# Patient Record
Sex: FEMALE | Race: WHITE | NOT HISPANIC OR LATINO
[De-identification: names, ages, dates, MRNs, and addresses within clinical notes are randomized per-mention and may not be internally consistent; named-entity substitution may affect disease eponyms.]

---

## 2017-01-18 ENCOUNTER — PRIOR ORIGINAL RECORDS (OUTPATIENT)
Dept: OTHER | Age: 62
End: 2017-01-18

## 2017-05-12 ENCOUNTER — PRIOR ORIGINAL RECORDS (OUTPATIENT)
Dept: OTHER | Age: 62
End: 2017-05-12

## 2017-06-27 ENCOUNTER — TELEPHONE (OUTPATIENT)
Dept: INTERNAL MEDICINE CLINIC | Facility: CLINIC | Age: 62
End: 2017-06-27

## 2017-06-27 DIAGNOSIS — E78.5 HYPERLIPIDEMIA, UNSPECIFIED HYPERLIPIDEMIA TYPE: ICD-10-CM

## 2017-06-27 DIAGNOSIS — Z00.00 ANNUAL PHYSICAL EXAM: ICD-10-CM

## 2017-06-27 DIAGNOSIS — Z11.59 ENCOUNTER FOR HEPATITIS C SCREENING TEST FOR LOW RISK PATIENT: ICD-10-CM

## 2017-06-27 DIAGNOSIS — R60.0 BILATERAL LOWER EXTREMITY EDEMA: Primary | ICD-10-CM

## 2017-06-27 DIAGNOSIS — E53.8 VITAMIN B 12 DEFICIENCY: ICD-10-CM

## 2017-06-27 DIAGNOSIS — E55.9 VITAMIN D DEFICIENCY: ICD-10-CM

## 2017-06-27 NOTE — TELEPHONE ENCOUNTER
Pt has an fifi for yearly physical on 7/31 pt would like and order for lab work including Magnesium, Vitamin D, Thyroid & B12. Pt would like the order mailed to her she is not sure where she will be going for labs.   Tasked to nursing

## 2017-07-20 ENCOUNTER — LAB ENCOUNTER (OUTPATIENT)
Dept: LAB | Age: 62
End: 2017-07-20
Attending: INTERNAL MEDICINE
Payer: COMMERCIAL

## 2017-07-20 ENCOUNTER — PRIOR ORIGINAL RECORDS (OUTPATIENT)
Dept: OTHER | Age: 62
End: 2017-07-20

## 2017-07-20 DIAGNOSIS — Z11.59 ENCOUNTER FOR HEPATITIS C SCREENING TEST FOR LOW RISK PATIENT: ICD-10-CM

## 2017-07-20 DIAGNOSIS — R60.0 BILATERAL LOWER EXTREMITY EDEMA: ICD-10-CM

## 2017-07-20 DIAGNOSIS — Z00.00 ANNUAL PHYSICAL EXAM: ICD-10-CM

## 2017-07-20 DIAGNOSIS — E55.9 VITAMIN D DEFICIENCY: ICD-10-CM

## 2017-07-20 DIAGNOSIS — E78.5 HYPERLIPIDEMIA, UNSPECIFIED HYPERLIPIDEMIA TYPE: ICD-10-CM

## 2017-07-20 LAB
ALBUMIN SERPL BCP-MCNC: 4.1 G/DL (ref 3.5–4.8)
ALBUMIN/GLOB SERPL: 1.1 {RATIO} (ref 1–2)
ALP SERPL-CCNC: 60 U/L (ref 32–100)
ALT SERPL-CCNC: 17 U/L (ref 14–54)
ANION GAP SERPL CALC-SCNC: 9 MMOL/L (ref 0–18)
AST SERPL-CCNC: 25 U/L (ref 15–41)
BACTERIA UR QL AUTO: NEGATIVE /HPF
BASOPHILS # BLD: 0 K/UL (ref 0–0.2)
BASOPHILS NFR BLD: 1 %
BILIRUB SERPL-MCNC: 1.2 MG/DL (ref 0.3–1.2)
BILIRUB UR QL: NEGATIVE
BUN SERPL-MCNC: 10 MG/DL (ref 8–20)
BUN/CREAT SERPL: 12.3 (ref 10–20)
CALCIUM SERPL-MCNC: 9.6 MG/DL (ref 8.5–10.5)
CHLORIDE SERPL-SCNC: 103 MMOL/L (ref 95–110)
CHOLEST SERPL-MCNC: 205 MG/DL (ref 110–200)
CLARITY UR: CLEAR
CO2 SERPL-SCNC: 27 MMOL/L (ref 22–32)
COLOR UR: YELLOW
CREAT SERPL-MCNC: 0.81 MG/DL (ref 0.5–1.5)
EOSINOPHIL # BLD: 0.1 K/UL (ref 0–0.7)
EOSINOPHIL NFR BLD: 3 %
ERYTHROCYTE [DISTWIDTH] IN BLOOD BY AUTOMATED COUNT: 13 % (ref 11–15)
GLOBULIN PLAS-MCNC: 3.6 G/DL (ref 2.5–3.7)
GLUCOSE SERPL-MCNC: 96 MG/DL (ref 70–99)
GLUCOSE UR-MCNC: NEGATIVE MG/DL
HCT VFR BLD AUTO: 42.1 % (ref 35–48)
HDLC SERPL-MCNC: 63 MG/DL
HGB BLD-MCNC: 14.5 G/DL (ref 12–16)
HGB UR QL STRIP.AUTO: NEGATIVE
KETONES UR-MCNC: NEGATIVE MG/DL
LDLC SERPL CALC-MCNC: 125 MG/DL (ref 0–99)
LYMPHOCYTES # BLD: 1.2 K/UL (ref 1–4)
LYMPHOCYTES NFR BLD: 28 %
MAGNESIUM SERPL-MCNC: 2 MG/DL (ref 1.8–2.5)
MCH RBC QN AUTO: 31.8 PG (ref 27–32)
MCHC RBC AUTO-ENTMCNC: 34.4 G/DL (ref 32–37)
MCV RBC AUTO: 92.6 FL (ref 80–100)
MONOCYTES # BLD: 0.5 K/UL (ref 0–1)
MONOCYTES NFR BLD: 10 %
NEUTROPHILS # BLD AUTO: 2.6 K/UL (ref 1.8–7.7)
NEUTROPHILS NFR BLD: 58 %
NITRITE UR QL STRIP.AUTO: NEGATIVE
NONHDLC SERPL-MCNC: 142 MG/DL
OSMOLALITY UR CALC.SUM OF ELEC: 287 MOSM/KG (ref 275–295)
PH UR: 8 [PH] (ref 5–8)
PLATELET # BLD AUTO: 191 K/UL (ref 140–400)
PMV BLD AUTO: 9.4 FL (ref 7.4–10.3)
POTASSIUM SERPL-SCNC: 3.9 MMOL/L (ref 3.3–5.1)
PROT SERPL-MCNC: 7.7 G/DL (ref 5.9–8.4)
PROT UR-MCNC: NEGATIVE MG/DL
RBC # BLD AUTO: 4.54 M/UL (ref 3.7–5.4)
RBC #/AREA URNS AUTO: <1 /HPF
SODIUM SERPL-SCNC: 139 MMOL/L (ref 136–144)
SP GR UR STRIP: 1.01 (ref 1–1.03)
TRIGL SERPL-MCNC: 86 MG/DL (ref 1–149)
TSH SERPL-ACNC: 2.21 UIU/ML (ref 0.45–5.33)
UROBILINOGEN UR STRIP-ACNC: <2
VIT B12 SERPL-MCNC: 164 PG/ML (ref 181–914)
VIT C UR-MCNC: NEGATIVE MG/DL
WBC # BLD AUTO: 4.5 K/UL (ref 4–11)
WBC #/AREA URNS AUTO: 5 /HPF

## 2017-07-20 PROCEDURE — 82306 VITAMIN D 25 HYDROXY: CPT

## 2017-07-20 PROCEDURE — 84443 ASSAY THYROID STIM HORMONE: CPT

## 2017-07-20 PROCEDURE — 85025 COMPLETE CBC W/AUTO DIFF WBC: CPT

## 2017-07-20 PROCEDURE — 82607 VITAMIN B-12: CPT

## 2017-07-20 PROCEDURE — 80061 LIPID PANEL: CPT

## 2017-07-20 PROCEDURE — 83735 ASSAY OF MAGNESIUM: CPT

## 2017-07-20 PROCEDURE — 80053 COMPREHEN METABOLIC PANEL: CPT

## 2017-07-20 PROCEDURE — 36415 COLL VENOUS BLD VENIPUNCTURE: CPT

## 2017-07-20 PROCEDURE — 86803 HEPATITIS C AB TEST: CPT

## 2017-07-20 PROCEDURE — 81001 URINALYSIS AUTO W/SCOPE: CPT

## 2017-07-21 LAB
25(OH)D3 SERPL-MCNC: 37.5 NG/ML
HCV AB SERPL QL IA: NONREACTIVE

## 2017-07-31 ENCOUNTER — OFFICE VISIT (OUTPATIENT)
Dept: INTERNAL MEDICINE CLINIC | Facility: CLINIC | Age: 62
End: 2017-07-31

## 2017-07-31 ENCOUNTER — TELEPHONE (OUTPATIENT)
Dept: INTERNAL MEDICINE CLINIC | Facility: CLINIC | Age: 62
End: 2017-07-31

## 2017-07-31 VITALS
HEART RATE: 69 BPM | TEMPERATURE: 98 F | DIASTOLIC BLOOD PRESSURE: 80 MMHG | WEIGHT: 248.81 LBS | HEIGHT: 62 IN | BODY MASS INDEX: 45.79 KG/M2 | SYSTOLIC BLOOD PRESSURE: 132 MMHG | OXYGEN SATURATION: 97 %

## 2017-07-31 DIAGNOSIS — Z00.00 ANNUAL PHYSICAL EXAM: Primary | ICD-10-CM

## 2017-07-31 DIAGNOSIS — E53.8 VITAMIN B 12 DEFICIENCY: ICD-10-CM

## 2017-07-31 DIAGNOSIS — I48.91 ATRIAL FIBRILLATION, UNSPECIFIED TYPE (HCC): ICD-10-CM

## 2017-07-31 DIAGNOSIS — R19.5 OCCULT BLOOD POSITIVE STOOL: ICD-10-CM

## 2017-07-31 DIAGNOSIS — Z23 NEED FOR SHINGLES VACCINE: ICD-10-CM

## 2017-07-31 DIAGNOSIS — R19.05 PERIUMBILICAL MASS: ICD-10-CM

## 2017-07-31 DIAGNOSIS — R92.8 ABNORMAL MAMMOGRAM: ICD-10-CM

## 2017-07-31 DIAGNOSIS — I87.2 CHRONIC VENOUS INSUFFICIENCY: ICD-10-CM

## 2017-07-31 PROCEDURE — 99214 OFFICE O/P EST MOD 30 MIN: CPT | Performed by: INTERNAL MEDICINE

## 2017-07-31 PROCEDURE — 99396 PREV VISIT EST AGE 40-64: CPT | Performed by: INTERNAL MEDICINE

## 2017-07-31 NOTE — TELEPHONE ENCOUNTER
Placed patient health provider screening form and lab test order on cart. I told patient she can pick these up tomorrow.

## 2017-07-31 NOTE — PROGRESS NOTES
HPI:   Collette Dire is a 60year old female presents with the following problems. Patient here for physical.    Patient generally feels well. She has no acute complaints. I did review her labs with her. Vit B12 level was low.   Recommended vit represent a benign intramammary lymph node. This likely correlated with the mammogram findings and is probably benign. Six-month follow-up was recommended.   Patient knows to follow-up in September with repeat mammogram.    Patient has chronic venous insu Disp:  Rfl:    vitamin E (E-400) 400 UNITS Oral Cap Take 1 capsule by mouth every other day. Disp:  Rfl:    acetaminophen (TYLENOL EXTRA STRENGTH) 500 MG Oral Tab Take 500 mg by mouth every 6 (six) hours as needed for Pain.  Disp:  Rfl:    loratadine (CL Range   HDL Cholesterol 63 mg/dL   Cholesterol, Total 205 (H) 110 - 200 mg/dL   Triglycerides 86 1 - 149 mg/dL   Non HDL Chol 142 (H) <130 mg/dL   LDL Cholesterol 125 (H) 0 - 99 mg/dL   -URINALYSIS, ROUTINE   Result Value Ref Range   Urine Color Yellow Yel Packs/day: 0.40      Years: 33.00        Quit date: 6/15/1974  Alcohol use: Yes           1.2 oz/week     Cans of beer: 2 per week         REVIEW OF SYSTEMS:   GENERAL:  feels well. No acute distress.    EYES ;  denies blurred vision or eye pain  HEEN Annual physical exam  Annual physical exam.    2. Chronic venous insufficiency  No acute ulcerations or swelling. Discussed availability Dr. Alanna Hopkins. For now patient wishes not to pursue. 3. Vitamin B 12 deficiency  Start vitamin B12 1000 mcg daily.   Rep

## 2017-09-10 ENCOUNTER — APPOINTMENT (OUTPATIENT)
Dept: GENERAL RADIOLOGY | Facility: HOSPITAL | Age: 62
DRG: 354 | End: 2017-09-10
Attending: HOSPITALIST
Payer: COMMERCIAL

## 2017-09-10 ENCOUNTER — APPOINTMENT (OUTPATIENT)
Dept: CT IMAGING | Facility: HOSPITAL | Age: 62
DRG: 354 | End: 2017-09-10
Attending: EMERGENCY MEDICINE
Payer: COMMERCIAL

## 2017-09-10 ENCOUNTER — HOSPITAL ENCOUNTER (INPATIENT)
Facility: HOSPITAL | Age: 62
LOS: 4 days | Discharge: HOME | DRG: 354 | End: 2017-09-14
Attending: EMERGENCY MEDICINE | Admitting: HOSPITALIST
Payer: COMMERCIAL

## 2017-09-10 DIAGNOSIS — K43.0 INCARCERATED INCISIONAL HERNIA: ICD-10-CM

## 2017-09-10 DIAGNOSIS — K56.609 SMALL BOWEL OBSTRUCTION (HCC): Primary | ICD-10-CM

## 2017-09-10 DIAGNOSIS — K43.6 VENTRAL HERNIA WITH OBSTRUCTION AND WITHOUT GANGRENE: ICD-10-CM

## 2017-09-10 LAB
ALBUMIN SERPL BCP-MCNC: 4.2 G/DL (ref 3.5–4.8)
ALBUMIN/GLOB SERPL: 1 {RATIO} (ref 1–2)
ALP SERPL-CCNC: 71 U/L (ref 32–100)
ALT SERPL-CCNC: 21 U/L (ref 14–54)
AMYLASE SERPL-CCNC: 40 U/L (ref 24–108)
ANION GAP SERPL CALC-SCNC: 12 MMOL/L (ref 0–18)
AST SERPL-CCNC: 33 U/L (ref 15–41)
BASOPHILS # BLD: 0.1 K/UL (ref 0–0.2)
BASOPHILS NFR BLD: 1 %
BILIRUB SERPL-MCNC: 1.4 MG/DL (ref 0.3–1.2)
BUN SERPL-MCNC: 12 MG/DL (ref 8–20)
BUN/CREAT SERPL: 11.5 (ref 10–20)
CALCIUM SERPL-MCNC: 10.2 MG/DL (ref 8.5–10.5)
CHLORIDE SERPL-SCNC: 104 MMOL/L (ref 95–110)
CO2 SERPL-SCNC: 19 MMOL/L (ref 22–32)
CREAT SERPL-MCNC: 1.04 MG/DL (ref 0.5–1.5)
EOSINOPHIL # BLD: 0 K/UL (ref 0–0.7)
EOSINOPHIL NFR BLD: 0 %
ERYTHROCYTE [DISTWIDTH] IN BLOOD BY AUTOMATED COUNT: 13.1 % (ref 11–15)
GLOBULIN PLAS-MCNC: 4.4 G/DL (ref 2.5–3.7)
GLUCOSE SERPL-MCNC: 149 MG/DL (ref 70–99)
HCT VFR BLD AUTO: 47.4 % (ref 35–48)
HGB BLD-MCNC: 16.3 G/DL (ref 12–16)
LACTATE SERPL-SCNC: 1 MMOL/L (ref 0.5–2.2)
LIPASE SERPL-CCNC: 33 U/L (ref 22–51)
LYMPHOCYTES # BLD: 1 K/UL (ref 1–4)
LYMPHOCYTES NFR BLD: 7 %
MAGNESIUM SERPL-MCNC: 1.9 MG/DL (ref 1.8–2.5)
MCH RBC QN AUTO: 30.9 PG (ref 27–32)
MCHC RBC AUTO-ENTMCNC: 34.4 G/DL (ref 32–37)
MCV RBC AUTO: 90 FL (ref 80–100)
MONOCYTES # BLD: 0.6 K/UL (ref 0–1)
MONOCYTES NFR BLD: 4 %
NEUTROPHILS # BLD AUTO: 11.8 K/UL (ref 1.8–7.7)
NEUTROPHILS NFR BLD: 88 %
OSMOLALITY UR CALC.SUM OF ELEC: 283 MOSM/KG (ref 275–295)
PLATELET # BLD AUTO: 261 K/UL (ref 140–400)
PMV BLD AUTO: 9 FL (ref 7.4–10.3)
POTASSIUM SERPL-SCNC: 3.3 MMOL/L (ref 3.3–5.1)
PROT SERPL-MCNC: 8.6 G/DL (ref 5.9–8.4)
RBC # BLD AUTO: 5.26 M/UL (ref 3.7–5.4)
SODIUM SERPL-SCNC: 135 MMOL/L (ref 136–144)
WBC # BLD AUTO: 13.4 K/UL (ref 4–11)

## 2017-09-10 PROCEDURE — 99223 1ST HOSP IP/OBS HIGH 75: CPT | Performed by: HOSPITALIST

## 2017-09-10 PROCEDURE — 71010 XR CHEST AP PORTABLE  (CPT=71010): CPT | Performed by: HOSPITALIST

## 2017-09-10 PROCEDURE — 74176 CT ABD & PELVIS W/O CONTRAST: CPT | Performed by: EMERGENCY MEDICINE

## 2017-09-10 RX ORDER — ONDANSETRON 2 MG/ML
INJECTION INTRAMUSCULAR; INTRAVENOUS
Status: DISPENSED
Start: 2017-09-10 | End: 2017-09-10

## 2017-09-10 RX ORDER — MELATONIN
1000 EVERY OTHER DAY
COMMUNITY

## 2017-09-10 RX ORDER — VITAMIN E 268 MG
400 CAPSULE ORAL DAILY
Status: DISCONTINUED | OUTPATIENT
Start: 2017-09-10 | End: 2017-09-14

## 2017-09-10 RX ORDER — 0.9 % SODIUM CHLORIDE 0.9 %
VIAL (ML) INJECTION
Status: DISPENSED
Start: 2017-09-10 | End: 2017-09-10

## 2017-09-10 RX ORDER — MORPHINE SULFATE 4 MG/ML
4 INJECTION, SOLUTION INTRAMUSCULAR; INTRAVENOUS ONCE
Status: COMPLETED | OUTPATIENT
Start: 2017-09-10 | End: 2017-09-10

## 2017-09-10 RX ORDER — TRIAMTERENE AND HYDROCHLOROTHIAZIDE 37.5; 25 MG/1; MG/1
1 CAPSULE ORAL DAILY
Status: DISCONTINUED | OUTPATIENT
Start: 2017-09-10 | End: 2017-09-14

## 2017-09-10 RX ORDER — CHOLECALCIFEROL (VITAMIN D3) 125 MCG
1000 CAPSULE ORAL DAILY
Status: DISCONTINUED | OUTPATIENT
Start: 2017-09-10 | End: 2017-09-14

## 2017-09-10 RX ORDER — SODIUM CHLORIDE 0.9 % (FLUSH) 0.9 %
3 SYRINGE (ML) INJECTION AS NEEDED
Status: DISCONTINUED | OUTPATIENT
Start: 2017-09-10 | End: 2017-09-14

## 2017-09-10 RX ORDER — ENALAPRILAT 2.5 MG/2ML
0.62 INJECTION INTRAVENOUS EVERY 6 HOURS PRN
Status: DISCONTINUED | OUTPATIENT
Start: 2017-09-10 | End: 2017-09-14

## 2017-09-10 RX ORDER — OMEGA-3S/DHA/EPA/FISH OIL/D3 300MG-1000
1000 CAPSULE ORAL DAILY
Status: DISCONTINUED | OUTPATIENT
Start: 2017-09-10 | End: 2017-09-14

## 2017-09-10 RX ORDER — HYDROMORPHONE HYDROCHLORIDE 1 MG/ML
0.3 INJECTION, SOLUTION INTRAMUSCULAR; INTRAVENOUS; SUBCUTANEOUS EVERY 4 HOURS PRN
Status: DISCONTINUED | OUTPATIENT
Start: 2017-09-10 | End: 2017-09-14

## 2017-09-10 RX ORDER — DILTIAZEM HYDROCHLORIDE 120 MG/1
120 CAPSULE, COATED, EXTENDED RELEASE ORAL DAILY
Status: DISCONTINUED | OUTPATIENT
Start: 2017-09-10 | End: 2017-09-14

## 2017-09-10 RX ORDER — ONDANSETRON 2 MG/ML
4 INJECTION INTRAMUSCULAR; INTRAVENOUS EVERY 4 HOURS PRN
Status: DISCONTINUED | OUTPATIENT
Start: 2017-09-10 | End: 2017-09-11

## 2017-09-10 RX ORDER — ASCORBIC ACID 500 MG
500 TABLET ORAL DAILY
Status: DISCONTINUED | OUTPATIENT
Start: 2017-09-10 | End: 2017-09-14

## 2017-09-10 RX ORDER — HYDROMORPHONE HYDROCHLORIDE 1 MG/ML
0.5 INJECTION, SOLUTION INTRAMUSCULAR; INTRAVENOUS; SUBCUTANEOUS EVERY 4 HOURS PRN
Status: DISCONTINUED | OUTPATIENT
Start: 2017-09-10 | End: 2017-09-14

## 2017-09-10 RX ORDER — IBUPROFEN 200 MG
200 TABLET ORAL EVERY 6 HOURS PRN
COMMUNITY
End: 2020-02-13

## 2017-09-10 RX ORDER — ONDANSETRON 2 MG/ML
4 INJECTION INTRAMUSCULAR; INTRAVENOUS ONCE
Status: COMPLETED | OUTPATIENT
Start: 2017-09-10 | End: 2017-09-10

## 2017-09-10 RX ORDER — CLINDAMYCIN PHOSPHATE 600 MG/50ML
600 INJECTION INTRAVENOUS
Status: DISCONTINUED | OUTPATIENT
Start: 2017-09-11 | End: 2017-09-11 | Stop reason: HOSPADM

## 2017-09-10 RX ORDER — DEXTROSE AND SODIUM CHLORIDE 5; .9 G/100ML; G/100ML
INJECTION, SOLUTION INTRAVENOUS CONTINUOUS
Status: DISCONTINUED | OUTPATIENT
Start: 2017-09-10 | End: 2017-09-13

## 2017-09-10 RX ORDER — MORPHINE SULFATE 4 MG/ML
INJECTION, SOLUTION INTRAMUSCULAR; INTRAVENOUS
Status: COMPLETED
Start: 2017-09-10 | End: 2017-09-10

## 2017-09-10 RX ORDER — DEXTROSE, SODIUM CHLORIDE, AND POTASSIUM CHLORIDE 5; .9; .15 G/100ML; G/100ML; G/100ML
INJECTION INTRAVENOUS CONTINUOUS
Status: DISCONTINUED | OUTPATIENT
Start: 2017-09-10 | End: 2017-09-10

## 2017-09-10 RX ORDER — DILTIAZEM HYDROCHLORIDE 5 MG/ML
10 INJECTION INTRAVENOUS EVERY 2 HOUR PRN
Status: DISCONTINUED | OUTPATIENT
Start: 2017-09-10 | End: 2017-09-14

## 2017-09-10 RX ORDER — ONDANSETRON 2 MG/ML
4 INJECTION INTRAMUSCULAR; INTRAVENOUS ONCE
Status: DISCONTINUED | OUTPATIENT
Start: 2017-09-10 | End: 2017-09-10

## 2017-09-10 RX ORDER — ALBUTEROL SULFATE 90 UG/1
2 AEROSOL, METERED RESPIRATORY (INHALATION) EVERY 4 HOURS PRN
Status: DISCONTINUED | OUTPATIENT
Start: 2017-09-10 | End: 2017-09-14

## 2017-09-10 NOTE — H&P
[facility]    PATIENT'S NAME: Hannah Brantley   ATTENDING PHYSICIAN: Houston Mary MD   PATIENT ACCOUNT#:   [id number]    LOCATION:  [address] [place] [address] #:   [id number]       YOB: 1955  ADMISSION DATE: evaluated by Dr. Fletcher Porras. Plan was for a reversal of the patient's anticoagulation and hydration with surgery planned for tomorrow.     PAST MEDICAL HISTORY:  Significant for allergic rhinitis, atrial fibrillation which was diagnosed in August of last y systems except as listed in the History of Present Illness. PHYSICAL EXAMINATION:    VITAL SIGNS:  Temperature was 97.3, pulse 95, respiratory rate 16, blood pressure 133/77, O2 saturation 99% on room air. HEENT:  Normocephalic, atraumatic.   Pupils e We will place on continuous O2 monitoring while sleeping and intermittent end-tidal CO2 monitoring. Obtain a chest x-ray preoperatively and EKG. Cardiology clearance pending as well. 3.   Metabolic acidosis. We will send lactic acid level and amylase.

## 2017-09-10 NOTE — CONSULTS
[facility]  Report of Consultation    Adele Moralez Patient Status:  Inpatient    1955 MRN [id number]   Location [facility] 4W/SW/SE Attending Yohan Hawkins MD   Hosp Day # 0 PCP Najma Trujillo MD     Reason for Consultat Comment:Other reaction(s): vomiting & joint pain  Metronidazole               Comment:Other reaction(s): Rash  Minocycline                 Comment:Other reaction(s): Trouble Breathing  Orphenadrine Citrate        Comment:Other reaction(s): shakey  [place] Data:  Diagnostics:    Labs:     Lab Results  Component Value Date   HGB 16.3 09/10/2017   HCT 47.4 09/10/2017    09/10/2017               Assessment/Plan:    1.  Afib - rates controlled; continue CCB PO; post op if unable to take PO, will need to sw

## 2017-09-10 NOTE — HISTORICAL OFFICE NOTE
Madonna Raymond  : 1955  ACCOUNT:  [id number]  [phone]  PCP: Dr. Chavez Poles     TODAY'S DATE: 2017  DICTATED BY:  Aleida Knight M.D. ]    CHIEF COMPLAINT: [Followup of Atrial fibrillation, Followup of Family history of CAD, Followup of socially. EXERCISE: minimal. DIET: no special diet. MARITAL STATUS: . EDUCATION: college graduate. OCCUPATION: nurse.      ALLERGIES: Biaxin - Oral, Bioflex - Oral, Doryx - Oral, Doxycycline - Oral, Flagyl - Oral, Shellfish-derived Products - CLASS a 2016. Not on lipid lowering agents. Goal LDL less than 130. We will get repeat fasting lipid panel, which she will get when she sees Dr. Chau Johnson for her annual physical in July 2017.    4. Return to followup visit in six months, or sooner if there are any ne motion was normal? there were no regional wall motion  abnormalities. · Left atrium: The atrium was moderately dilated. · Right ventricle: Systolic function was normal.  · Tricuspid valve: Mildmoderate  regurgitation.   · Pulmonary arteries: PA peak press

## 2017-09-10 NOTE — ED PROVIDER NOTES
Patient Seen in: [facility] AND [facility] Emergency Department    History   Patient presents with:  Abdominal Pain    Stated Complaint: abd pain     HPI    58-year-old female presents the emergency department with abdominal pain that began on Friday.   She repor HPI.    Physical Exam   ED Triage Vitals  BP: 131/82 [09/10/17 0107]  Pulse: 119 [09/10/17 0107]  Resp: 28 [09/10/17 0107]  Temp: (!) 97.3 °F (36.3 °C) [09/10/17 0107]  Temp src: n/a  SpO2: 99 % [09/10/17 0107]  O2 Device: None (Room air) [09/10/17 0316] Absolute 11.8 (*)     All other components within normal limits   LIPASE - Normal   CBC WITH DIFFERENTIAL WITH PLATELET    Narrative: The following orders were created for panel order CBC WITH DIFFERENTIAL WITH PLATELET.   Procedure Discharge Medication List        Present on Admission  Date Reviewed: 8/12/2014          ICD-10-CM Noted POA    Small bowel obstruction ([facility]) K56.69 9/10/2017 Unknown

## 2017-09-10 NOTE — CONSULTS
[facility]    Report of Consultation    Rachel Snider Patient Status:  Inpatient    1955 MRN [id number]   Location [facility] 4W/SW/SE Attending Andrae Washington MD   Hosp Day # 0 PCP Yael Barry MD     Date of Comment:Other reaction(s): Rash  Doxycycline                 Comment:Other reaction(s): Trouble Breathing  Doxycycline Hyclate         Comment:Other reaction(s): vomiting & joint pain  Metronidazole               Comment:Other reaction(s): Rash  Minocyclin is within normal limits. Spleen is not palpable. Extremities:  No lower extremity edema noted. Without clubbing or cyanosis. Skin: Normal texture and turgor. Lymphatic:  No palpable cervical lymphadenopathy.   Neurologic: Cranial nerves are grossly

## 2017-09-10 NOTE — PROGRESS NOTES
[id number] [facility]  Antibiotic Dosing    Yevgeniy Reveles is a 60year old female for whom pharmacy is dosing Meropenem for treatment of intra-abdominal infection.       Allergies: is allergic to bioflex; cefaclor; clarithromycin; doxycycline; do

## 2017-09-10 NOTE — ED NOTES
Patient unable to tolerate NG tube insertion. We attempted x5 but every time the tube gets passed the throat patient gags and we also attempted to use benzocaine spray. At this time patient decided not to do the NGT.  Patient is aware of the risks and benef

## 2017-09-10 NOTE — ED NOTES
Patient is working on barium contrast, she is having a little difficulty finishing one bottle.  Instructed patient to try as much as she can

## 2017-09-11 ENCOUNTER — ANESTHESIA EVENT (OUTPATIENT)
Dept: SURGERY | Facility: HOSPITAL | Age: 62
DRG: 354 | End: 2017-09-11
Payer: COMMERCIAL

## 2017-09-11 ENCOUNTER — ANESTHESIA (OUTPATIENT)
Dept: SURGERY | Facility: HOSPITAL | Age: 62
DRG: 354 | End: 2017-09-11
Payer: COMMERCIAL

## 2017-09-11 ENCOUNTER — SURGERY (OUTPATIENT)
Age: 62
End: 2017-09-11

## 2017-09-11 PROBLEM — K43.0 INCISIONAL HERNIA, INCARCERATED: Status: ACTIVE | Noted: 2017-09-11

## 2017-09-11 LAB
ANION GAP SERPL CALC-SCNC: 8 MMOL/L (ref 0–18)
BASOPHILS # BLD: 0.1 K/UL (ref 0–0.2)
BASOPHILS NFR BLD: 1 %
BUN SERPL-MCNC: 9 MG/DL (ref 8–20)
BUN/CREAT SERPL: 10.1 (ref 10–20)
CALCIUM SERPL-MCNC: 8.8 MG/DL (ref 8.5–10.5)
CHLORIDE SERPL-SCNC: 105 MMOL/L (ref 95–110)
CO2 SERPL-SCNC: 26 MMOL/L (ref 22–32)
CREAT SERPL-MCNC: 0.89 MG/DL (ref 0.5–1.5)
EOSINOPHIL # BLD: 0.1 K/UL (ref 0–0.7)
EOSINOPHIL NFR BLD: 3 %
ERYTHROCYTE [DISTWIDTH] IN BLOOD BY AUTOMATED COUNT: 13 % (ref 11–15)
GLUCOSE SERPL-MCNC: 115 MG/DL (ref 70–99)
HCT VFR BLD AUTO: 40.5 % (ref 35–48)
HGB BLD-MCNC: 13.8 G/DL (ref 12–16)
LYMPHOCYTES # BLD: 1.4 K/UL (ref 1–4)
LYMPHOCYTES NFR BLD: 26 %
MAGNESIUM SERPL-MCNC: 1.8 MG/DL (ref 1.8–2.5)
MCH RBC QN AUTO: 31.6 PG (ref 27–32)
MCHC RBC AUTO-ENTMCNC: 34.2 G/DL (ref 32–37)
MCV RBC AUTO: 92.5 FL (ref 80–100)
MONOCYTES # BLD: 0.7 K/UL (ref 0–1)
MONOCYTES NFR BLD: 13 %
NEUTROPHILS # BLD AUTO: 3.1 K/UL (ref 1.8–7.7)
NEUTROPHILS NFR BLD: 58 %
OSMOLALITY UR CALC.SUM OF ELEC: 288 MOSM/KG (ref 275–295)
PLATELET # BLD AUTO: 200 K/UL (ref 140–400)
PMV BLD AUTO: 8.5 FL (ref 7.4–10.3)
POTASSIUM SERPL-SCNC: 3.6 MMOL/L (ref 3.3–5.1)
POTASSIUM SERPL-SCNC: 3.6 MMOL/L (ref 3.3–5.1)
RBC # BLD AUTO: 4.37 M/UL (ref 3.7–5.4)
SODIUM SERPL-SCNC: 139 MMOL/L (ref 136–144)
WBC # BLD AUTO: 5.4 K/UL (ref 4–11)

## 2017-09-11 PROCEDURE — 0JB80ZZ EXCISION OF ABDOMEN SUBCUTANEOUS TISSUE AND FASCIA, OPEN APPROACH: ICD-10-PCS | Performed by: SURGERY

## 2017-09-11 PROCEDURE — S0077 INJECTION, CLINDAMYCIN PHOSP: HCPCS | Performed by: ANESTHESIOLOGY

## 2017-09-11 PROCEDURE — 0WQF0ZZ REPAIR ABDOMINAL WALL, OPEN APPROACH: ICD-10-PCS | Performed by: SURGERY

## 2017-09-11 PROCEDURE — 0WPF0JZ REMOVAL OF SYNTHETIC SUBSTITUTE FROM ABDOMINAL WALL, OPEN APPROACH: ICD-10-PCS | Performed by: SURGERY

## 2017-09-11 PROCEDURE — 99233 SBSQ HOSP IP/OBS HIGH 50: CPT | Performed by: HOSPITALIST

## 2017-09-11 DEVICE — VENTRIO ST HERNIA PATCH
Type: IMPLANTABLE DEVICE | Site: ABDOMEN | Status: FUNCTIONAL
Brand: VENTRIO ST HERNIA PATCH

## 2017-09-11 RX ORDER — ONDANSETRON 2 MG/ML
INJECTION INTRAMUSCULAR; INTRAVENOUS AS NEEDED
Status: DISCONTINUED | OUTPATIENT
Start: 2017-09-11 | End: 2017-09-11 | Stop reason: SURG

## 2017-09-11 RX ORDER — ROCURONIUM BROMIDE 10 MG/ML
INJECTION, SOLUTION INTRAVENOUS AS NEEDED
Status: DISCONTINUED | OUTPATIENT
Start: 2017-09-11 | End: 2017-09-11 | Stop reason: SURG

## 2017-09-11 RX ORDER — MORPHINE SULFATE 2 MG/ML
2 INJECTION, SOLUTION INTRAMUSCULAR; INTRAVENOUS EVERY 10 MIN PRN
Status: DISCONTINUED | OUTPATIENT
Start: 2017-09-11 | End: 2017-09-11 | Stop reason: HOSPADM

## 2017-09-11 RX ORDER — HYDROMORPHONE HYDROCHLORIDE 1 MG/ML
0.8 INJECTION, SOLUTION INTRAMUSCULAR; INTRAVENOUS; SUBCUTANEOUS EVERY 2 HOUR PRN
Status: DISCONTINUED | OUTPATIENT
Start: 2017-09-11 | End: 2017-09-14

## 2017-09-11 RX ORDER — MORPHINE SULFATE 10 MG/ML
6 INJECTION, SOLUTION INTRAMUSCULAR; INTRAVENOUS EVERY 10 MIN PRN
Status: DISCONTINUED | OUTPATIENT
Start: 2017-09-11 | End: 2017-09-11 | Stop reason: HOSPADM

## 2017-09-11 RX ORDER — HYDROMORPHONE HYDROCHLORIDE 1 MG/ML
0.2 INJECTION, SOLUTION INTRAMUSCULAR; INTRAVENOUS; SUBCUTANEOUS EVERY 5 MIN PRN
Status: DISCONTINUED | OUTPATIENT
Start: 2017-09-11 | End: 2017-09-11 | Stop reason: HOSPADM

## 2017-09-11 RX ORDER — METOPROLOL TARTRATE 5 MG/5ML
2.5 INJECTION INTRAVENOUS EVERY 6 HOURS PRN
Status: DISCONTINUED | OUTPATIENT
Start: 2017-09-11 | End: 2017-09-14

## 2017-09-11 RX ORDER — MORPHINE SULFATE 4 MG/ML
4 INJECTION, SOLUTION INTRAMUSCULAR; INTRAVENOUS EVERY 10 MIN PRN
Status: DISCONTINUED | OUTPATIENT
Start: 2017-09-11 | End: 2017-09-11 | Stop reason: HOSPADM

## 2017-09-11 RX ORDER — HYDROCODONE BITARTRATE AND ACETAMINOPHEN 5; 325 MG/1; MG/1
1 TABLET ORAL AS NEEDED
Status: DISCONTINUED | OUTPATIENT
Start: 2017-09-11 | End: 2017-09-11 | Stop reason: HOSPADM

## 2017-09-11 RX ORDER — MIDAZOLAM HYDROCHLORIDE 1 MG/ML
INJECTION INTRAMUSCULAR; INTRAVENOUS AS NEEDED
Status: DISCONTINUED | OUTPATIENT
Start: 2017-09-11 | End: 2017-09-11 | Stop reason: SURG

## 2017-09-11 RX ORDER — ONDANSETRON 2 MG/ML
4 INJECTION INTRAMUSCULAR; INTRAVENOUS EVERY 6 HOURS PRN
Status: DISCONTINUED | OUTPATIENT
Start: 2017-09-11 | End: 2017-09-14

## 2017-09-11 RX ORDER — CLINDAMYCIN PHOSPHATE 150 MG/ML
INJECTION, SOLUTION INTRAVENOUS AS NEEDED
Status: DISCONTINUED | OUTPATIENT
Start: 2017-09-11 | End: 2017-09-11 | Stop reason: SURG

## 2017-09-11 RX ORDER — BUPIVACAINE HYDROCHLORIDE 2.5 MG/ML
INJECTION, SOLUTION EPIDURAL; INFILTRATION; INTRACAUDAL AS NEEDED
Status: DISCONTINUED | OUTPATIENT
Start: 2017-09-11 | End: 2017-09-11 | Stop reason: HOSPADM

## 2017-09-11 RX ORDER — SODIUM CHLORIDE, SODIUM LACTATE, POTASSIUM CHLORIDE, CALCIUM CHLORIDE 600; 310; 30; 20 MG/100ML; MG/100ML; MG/100ML; MG/100ML
INJECTION, SOLUTION INTRAVENOUS CONTINUOUS
Status: DISCONTINUED | OUTPATIENT
Start: 2017-09-11 | End: 2017-09-12

## 2017-09-11 RX ORDER — ACETAMINOPHEN 325 MG/1
650 TABLET ORAL EVERY 4 HOURS PRN
Status: DISCONTINUED | OUTPATIENT
Start: 2017-09-11 | End: 2017-09-14

## 2017-09-11 RX ORDER — HALOPERIDOL 5 MG/ML
0.25 INJECTION INTRAMUSCULAR ONCE AS NEEDED
Status: DISCONTINUED | OUTPATIENT
Start: 2017-09-11 | End: 2017-09-11 | Stop reason: HOSPADM

## 2017-09-11 RX ORDER — LIDOCAINE HYDROCHLORIDE 10 MG/ML
INJECTION, SOLUTION EPIDURAL; INFILTRATION; INTRACAUDAL; PERINEURAL AS NEEDED
Status: DISCONTINUED | OUTPATIENT
Start: 2017-09-11 | End: 2017-09-11 | Stop reason: SURG

## 2017-09-11 RX ORDER — NALOXONE HYDROCHLORIDE 0.4 MG/ML
80 INJECTION, SOLUTION INTRAMUSCULAR; INTRAVENOUS; SUBCUTANEOUS AS NEEDED
Status: DISCONTINUED | OUTPATIENT
Start: 2017-09-11 | End: 2017-09-11 | Stop reason: HOSPADM

## 2017-09-11 RX ORDER — MAGNESIUM SULFATE HEPTAHYDRATE 40 MG/ML
2 INJECTION, SOLUTION INTRAVENOUS ONCE
Status: DISCONTINUED | OUTPATIENT
Start: 2017-09-11 | End: 2017-09-14

## 2017-09-11 RX ORDER — HYDROCODONE BITARTRATE AND ACETAMINOPHEN 7.5; 325 MG/1; MG/1
1 TABLET ORAL EVERY 4 HOURS PRN
Status: DISCONTINUED | OUTPATIENT
Start: 2017-09-11 | End: 2017-09-14

## 2017-09-11 RX ORDER — ENOXAPARIN SODIUM 100 MG/ML
30 INJECTION SUBCUTANEOUS EVERY 12 HOURS
Status: DISCONTINUED | OUTPATIENT
Start: 2017-09-12 | End: 2017-09-12

## 2017-09-11 RX ORDER — HYDROMORPHONE HYDROCHLORIDE 1 MG/ML
0.4 INJECTION, SOLUTION INTRAMUSCULAR; INTRAVENOUS; SUBCUTANEOUS EVERY 5 MIN PRN
Status: DISCONTINUED | OUTPATIENT
Start: 2017-09-11 | End: 2017-09-11 | Stop reason: HOSPADM

## 2017-09-11 RX ORDER — HYDROMORPHONE HYDROCHLORIDE 1 MG/ML
0.2 INJECTION, SOLUTION INTRAMUSCULAR; INTRAVENOUS; SUBCUTANEOUS EVERY 2 HOUR PRN
Status: DISCONTINUED | OUTPATIENT
Start: 2017-09-11 | End: 2017-09-14

## 2017-09-11 RX ORDER — DEXAMETHASONE SODIUM PHOSPHATE 4 MG/ML
VIAL (ML) INJECTION AS NEEDED
Status: DISCONTINUED | OUTPATIENT
Start: 2017-09-11 | End: 2017-09-11 | Stop reason: SURG

## 2017-09-11 RX ORDER — HYDROCODONE BITARTRATE AND ACETAMINOPHEN 5; 325 MG/1; MG/1
2 TABLET ORAL AS NEEDED
Status: DISCONTINUED | OUTPATIENT
Start: 2017-09-11 | End: 2017-09-11 | Stop reason: HOSPADM

## 2017-09-11 RX ORDER — HYDROCODONE BITARTRATE AND ACETAMINOPHEN 7.5; 325 MG/1; MG/1
2 TABLET ORAL EVERY 4 HOURS PRN
Status: DISCONTINUED | OUTPATIENT
Start: 2017-09-11 | End: 2017-09-14

## 2017-09-11 RX ORDER — HYDROMORPHONE HYDROCHLORIDE 1 MG/ML
0.4 INJECTION, SOLUTION INTRAMUSCULAR; INTRAVENOUS; SUBCUTANEOUS EVERY 2 HOUR PRN
Status: DISCONTINUED | OUTPATIENT
Start: 2017-09-11 | End: 2017-09-14

## 2017-09-11 RX ORDER — GLYCOPYRROLATE 0.2 MG/ML
INJECTION INTRAMUSCULAR; INTRAVENOUS AS NEEDED
Status: DISCONTINUED | OUTPATIENT
Start: 2017-09-11 | End: 2017-09-11 | Stop reason: SURG

## 2017-09-11 RX ORDER — SODIUM CHLORIDE 0.9 % (FLUSH) 0.9 %
10 SYRINGE (ML) INJECTION AS NEEDED
Status: DISCONTINUED | OUTPATIENT
Start: 2017-09-11 | End: 2017-09-14

## 2017-09-11 RX ORDER — HYDROMORPHONE HYDROCHLORIDE 1 MG/ML
0.6 INJECTION, SOLUTION INTRAMUSCULAR; INTRAVENOUS; SUBCUTANEOUS EVERY 5 MIN PRN
Status: DISCONTINUED | OUTPATIENT
Start: 2017-09-11 | End: 2017-09-11 | Stop reason: HOSPADM

## 2017-09-11 RX ORDER — ONDANSETRON 2 MG/ML
4 INJECTION INTRAMUSCULAR; INTRAVENOUS ONCE AS NEEDED
Status: COMPLETED | OUTPATIENT
Start: 2017-09-11 | End: 2017-09-11

## 2017-09-11 RX ORDER — NEOSTIGMINE METHYLSULFATE 0.5 MG/ML
INJECTION INTRAVENOUS AS NEEDED
Status: DISCONTINUED | OUTPATIENT
Start: 2017-09-11 | End: 2017-09-11 | Stop reason: SURG

## 2017-09-11 RX ORDER — SODIUM CHLORIDE, SODIUM LACTATE, POTASSIUM CHLORIDE, CALCIUM CHLORIDE 600; 310; 30; 20 MG/100ML; MG/100ML; MG/100ML; MG/100ML
INJECTION, SOLUTION INTRAVENOUS CONTINUOUS PRN
Status: DISCONTINUED | OUTPATIENT
Start: 2017-09-11 | End: 2017-09-11 | Stop reason: SURG

## 2017-09-11 RX ADMIN — MIDAZOLAM HYDROCHLORIDE 2 MG: 1 INJECTION INTRAMUSCULAR; INTRAVENOUS at 11:12:00

## 2017-09-11 RX ADMIN — ONDANSETRON 4 MG: 2 INJECTION INTRAMUSCULAR; INTRAVENOUS at 12:43:00

## 2017-09-11 RX ADMIN — LIDOCAINE HYDROCHLORIDE 50 MG: 10 INJECTION, SOLUTION EPIDURAL; INFILTRATION; INTRACAUDAL; PERINEURAL at 11:12:00

## 2017-09-11 RX ADMIN — GLYCOPYRROLATE 0.2 MG: 0.2 INJECTION INTRAMUSCULAR; INTRAVENOUS at 12:45:00

## 2017-09-11 RX ADMIN — NEOSTIGMINE METHYLSULFATE 2 MG: 0.5 INJECTION INTRAVENOUS at 12:45:00

## 2017-09-11 RX ADMIN — CLINDAMYCIN PHOSPHATE 600 MG: 150 INJECTION, SOLUTION INTRAVENOUS at 11:02:00

## 2017-09-11 RX ADMIN — SODIUM CHLORIDE, SODIUM LACTATE, POTASSIUM CHLORIDE, CALCIUM CHLORIDE: 600; 310; 30; 20 INJECTION, SOLUTION INTRAVENOUS at 12:48:00

## 2017-09-11 RX ADMIN — DEXAMETHASONE SODIUM PHOSPHATE 4 MG: 4 MG/ML VIAL (ML) INJECTION at 11:25:00

## 2017-09-11 RX ADMIN — SODIUM CHLORIDE, SODIUM LACTATE, POTASSIUM CHLORIDE, CALCIUM CHLORIDE: 600; 310; 30; 20 INJECTION, SOLUTION INTRAVENOUS at 11:01:00

## 2017-09-11 RX ADMIN — ROCURONIUM BROMIDE 35 MG: 10 INJECTION, SOLUTION INTRAVENOUS at 11:12:00

## 2017-09-11 NOTE — PROGRESS NOTES
[facility] Note: Weight Dose Adjustment for: Enoxaparin    Alex Resendiz is a 60year old female who has been prescribed Enoxaparin 40mg SC daily. CrCl is estimated creatinine clearance is 51.2 mL/min (based on SCr of 0.89 mg/dL).  and pt has a jessicag

## 2017-09-11 NOTE — ANESTHESIA PREPROCEDURE EVALUATION
Anesthesia PreOp Note    HPI:     Eddie Morales is a 60year old female who presents for preoperative consultation requested by: Moisés Pimentel MD    Date of Surgery: 9/10/2017 - 9/11/2017    Procedure(s):   HERNIA INCISIONAL REPAIR  Indication Capsule SR 24 Hr Take 1 tablet by mouth daily. Disp:  Rfl: 0 Taking   Triamterene-HCTZ 37.5-25 MG Oral Cap Take 1 capsule by mouth daily.  Disp: 90 capsule Rfl: 3 Taking   Albuterol Sulfate HFA (PROAIR HFA) 108 (90 BASE) MCG/ACT Inhalation Aero Soln Inhale 0.9 % injection 3 mL 3 mL Intravenous PRN Lorenzo Aiken MD 3 mL at 09/11/17 0725    HYDROmorphone HCl PF (DILAUDID) 1 MG/ML injection 0.3 mg 0.3 mg Intravenous Q4H PRN Lorenzo Aiken MD     HYDROmorphone HCl PF (DILAUDID) 1 MG/ML injection 0 Rash  Clarithromycin              Comment:Other reaction(s): Rash  Doxycycline                 Comment:Other reaction(s): Trouble Breathing  Doxycycline Hyclate         Comment:Other reaction(s): vomiting & joint pain  Metronidazole               Comment:O Vital Signs: Body mass index is 44.35 kg/m². height is 1.575 m (5' 2\") and weight is 110 kg (242 lb 8 oz). Her oral temperature is 97.9 °F (36.6 °C). Her blood pressure is 119/61 and her pulse is 82.  Her respiration is 20 and oxygen saturation is 9

## 2017-09-11 NOTE — PROGRESS NOTES
[facility]    Progress Note    Devorahcarter Liz Patient Status:  Inpatient    1955 MRN [id number]   Location [address] UNIT Attending Holly Centeno MD   [facility] Day # 1 PCP Tonio Bhagat MD       SUB both hernias with substantial stasis of the intraluminal contents of the more inferolateral hernia. There may be an element of closed loop obstruction. 2. Scattered distal colonic diverticulosis without CT evidence of acute complication.   3. Chronicity-in Intravenous Q10 Min PRN   Atropine Sulfate 0.1 MG/ML injection 0.5 mg 0.5 mg Intravenous PRN   haloperidol lactate (HALDOL) 5 MG/ML injection 0.25 mg 0.25 mg Intravenous Once PRN   Naloxone HCl (NARCAN) 0.4 MG/ML injection 80 mcg 80 mcg Intravenous PRN   [ Jasmyne  - s/p repair of hernia and excisional debridement subcutaneous tissiue fascia and removal of previous mesh  - IV dilaudid prn pain  - started IV meropenem  - IV zofran prn nausea  - NPO - diet advancement per surgery    Afib  - currently rate c

## 2017-09-11 NOTE — PROGRESS NOTES
[facility]    Progress Note    [address] Patient Status:  Inpatient    1955 MRN [id number]   Location [facility] 4W/SW/SE Attending Rashad Lan MD   Hosp Day # 1 PCP Lynn Arce MD         Assessment and Plan: Lab Results  Component Value Date   WBC 5.4 09/11/2017   HGB 13.8 09/11/2017   HCT 40.5 09/11/2017    09/11/2017   CREATSERUM 0.89 09/11/2017   BUN 9 09/11/2017    09/11/2017   K 3.6 09/11/2017   K 3.6 09/11/2017    09/11/2017   CO2 infarct. ABNORMAL When compared with ECG of 03/04/2007 17:38:33 Electronically signed on 09/10/2017 at 17:39 by Alex Zuñiga M.D.         Ge Gage MD  9/11/2017

## 2017-09-11 NOTE — PLAN OF CARE
Minimal or absence of nausea and vomiting Progressing      Maintains or returns to baseline bowel function Progressing    Pt had hernia repair surgery today and has had episodes of nausea, with zofran and compazine given and ordered PRN.  Will monitor for b

## 2017-09-11 NOTE — ANESTHESIA POSTPROCEDURE EVALUATION
Patient: Yesy Quiñonez    Procedure Summary     Date:  09/11/17 Room / Location:  [facility] OR  / [place] [facility] OR    Anesthesia Start:  1101 Anesthesia Stop:  5322    Procedure:   HERNIA INCISIONAL REPAIR (N/A ) Diagnosis:       Ventral hernia with obstru

## 2017-09-11 NOTE — BRIEF OP NOTE
Pre-Operative Diagnosis: Ventral hernia with obstruction and without gangrene [K43.6]     Post-Operative Diagnosis: Ventral hernia with obstruction and without gangrene [K43.6]     Procedure Performed:   Procedure(s):  REPAIR INCARCERATED RECURRENT INCI

## 2017-09-12 LAB
ANION GAP SERPL CALC-SCNC: 8 MMOL/L (ref 0–18)
BASOPHILS # BLD: 0 K/UL (ref 0–0.2)
BASOPHILS NFR BLD: 0 %
BUN SERPL-MCNC: 8 MG/DL (ref 8–20)
BUN/CREAT SERPL: 9.8 (ref 10–20)
CALCIUM SERPL-MCNC: 8.7 MG/DL (ref 8.5–10.5)
CHLORIDE SERPL-SCNC: 105 MMOL/L (ref 95–110)
CO2 SERPL-SCNC: 27 MMOL/L (ref 22–32)
CREAT SERPL-MCNC: 0.82 MG/DL (ref 0.5–1.5)
EOSINOPHIL # BLD: 0 K/UL (ref 0–0.7)
EOSINOPHIL NFR BLD: 0 %
ERYTHROCYTE [DISTWIDTH] IN BLOOD BY AUTOMATED COUNT: 13.3 % (ref 11–15)
GLUCOSE SERPL-MCNC: 106 MG/DL (ref 70–99)
HCT VFR BLD AUTO: 40.5 % (ref 35–48)
HGB BLD-MCNC: 13.7 G/DL (ref 12–16)
LYMPHOCYTES # BLD: 0.8 K/UL (ref 1–4)
LYMPHOCYTES NFR BLD: 8 %
MAGNESIUM SERPL-MCNC: 2.2 MG/DL (ref 1.8–2.5)
MCH RBC QN AUTO: 31.5 PG (ref 27–32)
MCHC RBC AUTO-ENTMCNC: 33.9 G/DL (ref 32–37)
MCV RBC AUTO: 93 FL (ref 80–100)
MONOCYTES # BLD: 1 K/UL (ref 0–1)
MONOCYTES NFR BLD: 11 %
NEUTROPHILS # BLD AUTO: 7.6 K/UL (ref 1.8–7.7)
NEUTROPHILS NFR BLD: 81 %
OSMOLALITY UR CALC.SUM OF ELEC: 289 MOSM/KG (ref 275–295)
PLATELET # BLD AUTO: 207 K/UL (ref 140–400)
PMV BLD AUTO: 8.9 FL (ref 7.4–10.3)
POTASSIUM SERPL-SCNC: 3.4 MMOL/L (ref 3.3–5.1)
POTASSIUM SERPL-SCNC: 3.4 MMOL/L (ref 3.3–5.1)
POTASSIUM SERPL-SCNC: 3.9 MMOL/L (ref 3.3–5.1)
RBC # BLD AUTO: 4.36 M/UL (ref 3.7–5.4)
SODIUM SERPL-SCNC: 140 MMOL/L (ref 136–144)
WBC # BLD AUTO: 9.4 K/UL (ref 4–11)

## 2017-09-12 PROCEDURE — 99233 SBSQ HOSP IP/OBS HIGH 50: CPT | Performed by: HOSPITALIST

## 2017-09-12 RX ORDER — POTASSIUM CHLORIDE 20 MEQ/1
40 TABLET, EXTENDED RELEASE ORAL EVERY 4 HOURS
Status: COMPLETED | OUTPATIENT
Start: 2017-09-12 | End: 2017-09-12

## 2017-09-12 NOTE — PROGRESS NOTES
NORY IS FEELING WELL TODAY  PAIN AS EXPECTED  NO FEVER OR CHILLS  MAKING GOOD URINE  NO NAUSEA OR VOMITING  ABDOMEN IS SOFT AND NONDISTENDED  GOOD BOWEL SOUNDS  DRESSING CLEAN AND DRY  EXT NO EDEMA  LUNGS CLEAR    PLAN:  ADV TO FULL LIQUIDS

## 2017-09-12 NOTE — PROGRESS NOTES
[facility]    Progress Note    [address] Patient Status:  Inpatient    1955 MRN [id number]   Location [address] UNIT Attending Patrick Rao MD   [address] Day # 2 PCP Yolanda Solitario MD       SUB components. There appear to be transition points in both hernias with substantial stasis of the intraluminal contents of the more inferolateral hernia. There may be an element of closed loop obstruction.   2. Scattered distal colonic diverticulosis without HCl PF (DILAUDID) 1 MG/ML injection 0.3 mg 0.3 mg Intravenous Q4H PRN   HYDROmorphone HCl PF (DILAUDID) 1 MG/ML injection 0.5 mg 0.5 mg Intravenous Q4H PRN   dextrose 5 % and 0.9 % NaCl infusion  Intravenous Continuous   Albuterol Sulfate  (90 Base) acidosis  - improved      Hyponatremia  - mild, resolved    Asthma  - stable    Prophylaxis:   DVT with SCDs, eliquis    Dispo: pending    Greater than 35 minutes spent, >50% spent counseling re: treatment plan and workup      Ave Guo MD

## 2017-09-12 NOTE — PROGRESS NOTES
[facility]    Progress Note    [address] Patient Status:  Inpatient    1955 MRN [id number]   Location [facility] 4W/SW/SE Attending Holly Centeno MD   Hosp Day # 2 PCP Tonio Bhagat MD         Assessment and Plan: Value Date   WBC 9.4 09/12/2017   HGB 13.7 09/12/2017   HCT 40.5 09/12/2017    09/12/2017   CREATSERUM 0.82 09/12/2017   BUN 8 09/12/2017    09/12/2017   K 3.4 09/12/2017   K 3.4 09/12/2017    09/12/2017   CO2 27 09/12/2017    (H)

## 2017-09-13 LAB
ANION GAP SERPL CALC-SCNC: 9 MMOL/L (ref 0–18)
BASOPHILS # BLD: 0 K/UL (ref 0–0.2)
BASOPHILS NFR BLD: 0 %
BUN SERPL-MCNC: 9 MG/DL (ref 8–20)
BUN/CREAT SERPL: 10.6 (ref 10–20)
CALCIUM SERPL-MCNC: 8.6 MG/DL (ref 8.5–10.5)
CHLORIDE SERPL-SCNC: 102 MMOL/L (ref 95–110)
CO2 SERPL-SCNC: 27 MMOL/L (ref 22–32)
CREAT SERPL-MCNC: 0.85 MG/DL (ref 0.5–1.5)
EOSINOPHIL # BLD: 0 K/UL (ref 0–0.7)
EOSINOPHIL NFR BLD: 0 %
ERYTHROCYTE [DISTWIDTH] IN BLOOD BY AUTOMATED COUNT: 13.1 % (ref 11–15)
GLUCOSE SERPL-MCNC: 113 MG/DL (ref 70–99)
HCT VFR BLD AUTO: 36.4 % (ref 35–48)
HGB BLD-MCNC: 12.4 G/DL (ref 12–16)
LYMPHOCYTES # BLD: 1.3 K/UL (ref 1–4)
LYMPHOCYTES NFR BLD: 16 %
MCH RBC QN AUTO: 31.5 PG (ref 27–32)
MCHC RBC AUTO-ENTMCNC: 34.1 G/DL (ref 32–37)
MCV RBC AUTO: 92.5 FL (ref 80–100)
MONOCYTES # BLD: 1 K/UL (ref 0–1)
MONOCYTES NFR BLD: 13 %
NEUTROPHILS # BLD AUTO: 5.5 K/UL (ref 1.8–7.7)
NEUTROPHILS NFR BLD: 70 %
OSMOLALITY UR CALC.SUM OF ELEC: 285 MOSM/KG (ref 275–295)
PLATELET # BLD AUTO: 174 K/UL (ref 140–400)
PMV BLD AUTO: 8.7 FL (ref 7.4–10.3)
POTASSIUM SERPL-SCNC: 3.6 MMOL/L (ref 3.3–5.1)
RBC # BLD AUTO: 3.93 M/UL (ref 3.7–5.4)
SODIUM SERPL-SCNC: 138 MMOL/L (ref 136–144)
WBC # BLD AUTO: 7.9 K/UL (ref 4–11)

## 2017-09-13 PROCEDURE — 99233 SBSQ HOSP IP/OBS HIGH 50: CPT | Performed by: HOSPITALIST

## 2017-09-13 RX ORDER — DILTIAZEM HYDROCHLORIDE 120 MG/1
120 CAPSULE, COATED, EXTENDED RELEASE ORAL DAILY
Qty: 30 CAPSULE | Refills: 2 | Status: SHIPPED | OUTPATIENT
Start: 2017-09-14 | End: 2019-08-08

## 2017-09-13 NOTE — PROGRESS NOTES
S:  2 day status post Incarcerated recurrent incisional hernia repair+removal of previous mesh. No acute overnight events. Feeling sluggish. Pain is worst with movement, and relieved with rest and pain meds.    Making good urine output, went about 3-4x la

## 2017-09-13 NOTE — PROGRESS NOTES
[facility]    Progress Note    [address] Patient Status:  Inpatient    1955 MRN [id number]   Location [address] UNIT Attending Jero Nowak MD   Hosp Day # 3 PCP Lauren Moralez MD       SUB acetaminophen (TYLENOL) tab 650 mg 650 mg Oral Q4H PRN   Or      hydrocodone-acetaminophen (NORCO) 7.5-325 MG per tab 1 tablet 1 tablet Oral Q4H PRN   Or      hydrocodone-acetaminophen (NORCO) 7.5-325 MG per tab 2 tablet 2 tablet Oral Q4H PRN   HYDROmorp and without gangrene     Incisional hernia, incarcerated      Plan:     Incarcerated Ventral Hernia with SBO  - taken to the OR by Dr Latia Mascorro  - s/p repair of hernia and excisional debridement subcutaneous tissiue fascia and removal of previous mesh  -

## 2017-09-14 VITALS
HEART RATE: 108 BPM | RESPIRATION RATE: 18 BRPM | HEIGHT: 62 IN | DIASTOLIC BLOOD PRESSURE: 72 MMHG | TEMPERATURE: 98 F | WEIGHT: 242.5 LBS | OXYGEN SATURATION: 97 % | SYSTOLIC BLOOD PRESSURE: 137 MMHG | BODY MASS INDEX: 44.63 KG/M2

## 2017-09-14 LAB — POTASSIUM SERPL-SCNC: 3.2 MMOL/L (ref 3.3–5.1)

## 2017-09-14 PROCEDURE — 99239 HOSP IP/OBS DSCHRG MGMT >30: CPT | Performed by: HOSPITALIST

## 2017-09-14 RX ORDER — POTASSIUM CHLORIDE 20 MEQ/1
40 TABLET, EXTENDED RELEASE ORAL EVERY 4 HOURS
Status: DISCONTINUED | OUTPATIENT
Start: 2017-09-14 | End: 2017-09-14

## 2017-09-14 RX ORDER — HYDROCODONE BITARTRATE AND ACETAMINOPHEN 7.5; 325 MG/1; MG/1
1 TABLET ORAL EVERY 4 HOURS PRN
Qty: 30 TABLET | Refills: 0 | Status: SHIPPED | OUTPATIENT
Start: 2017-09-14 | End: 2017-10-06

## 2017-09-14 NOTE — PROGRESS NOTES
I FEEL MUCH BETTER  PAIN IS LESS  NO NAUSEA OR VOMITING  PASSING GAS  ABDOMEN IS SOFT AND NONTENDER  GOOD BS'S  INCISION CLEAN AND DRY  PLAN:  D/C HOME              F/U IN 2 WEEKS              INSTRUCTIONS GIVEN TO PATIENT

## 2017-09-14 NOTE — DISCHARGE SUMMARY
[facility]    Discharge Summary    [address] Patient Status:  Inpatient    1955 MRN [id number]   Location [facility] 4W/SW/SE Attending Maria Dolores Anne MD   Hosp Day # 4 PCP Jessica Singh MD     Date of Admission: history of ventral hernia. She had 2 abdominal hernia repairs, one with mesh. She subsequently had a recurrence of the hernia and states that on Friday she started having some abdominal crampy pain which was mild.   She thought that it might be related to residue diet until surgical f/u     Afib  - currently rate controlled  - resumed eliquis      Morbid obesity  - BMI 44  - should have outpt follow up     Possible CRISTI  - needs outpt follow up     Metabolic acidosis  - improved               Hyponatremia  - topically 2 (two) times daily as needed. Refills:  0     loratadine 10 MG Tabs  Commonly known as:  CLARITIN      Take 10 mg by mouth daily as needed. Refills:  0     MULTIVITAMINS Caps      Take 1 capsule by mouth every other day.    Refills:  0     N

## 2017-09-14 NOTE — PROGRESS NOTES
[facility] Note:  Renal Adjustment for Merrem (meropenem)    Linda Hermosillo is a 60year old female who has been prescribed Merrem (meropenem) 1g  every 12 hrs. CrCl is estimated creatinine clearance is 53.6 mL/min (based on SCr of 0.85 mg/dL).  so t

## 2017-09-15 ENCOUNTER — PATIENT OUTREACH (OUTPATIENT)
Dept: INTERNAL MEDICINE CLINIC | Facility: CLINIC | Age: 62
End: 2017-09-15

## 2017-09-15 ENCOUNTER — TELEPHONE (OUTPATIENT)
Dept: CARDIOLOGY UNIT | Facility: HOSPITAL | Age: 62
End: 2017-09-15

## 2017-09-15 ENCOUNTER — TELEPHONE (OUTPATIENT)
Dept: INTERNAL MEDICINE UNIT | Facility: HOSPITAL | Age: 62
End: 2017-09-15

## 2017-09-15 NOTE — PROGRESS NOTES
Called patient to initiate TCM encounter. LMTCB. Patient discharged 9/14/17. Needs f/u appt made with PCP.

## 2017-09-15 NOTE — TELEPHONE ENCOUNTER
Pt discharged from [facility] on 9/14/17 . Please call to schedule follow up with Primary Care Physician.    Thanks

## 2017-09-18 ENCOUNTER — TELEPHONE (OUTPATIENT)
Dept: CARDIOLOGY UNIT | Facility: HOSPITAL | Age: 62
End: 2017-09-18

## 2017-09-19 ENCOUNTER — TELEPHONE (OUTPATIENT)
Dept: INTERNAL MEDICINE CLINIC | Facility: CLINIC | Age: 62
End: 2017-09-19

## 2017-09-19 NOTE — TELEPHONE ENCOUNTER
To Dr. Temo Rai - see below - pt feeling all right, pain is controlled well with Tylenol with 1 Norco/daily. Pt ambulating well around the house.

## 2017-09-19 NOTE — TELEPHONE ENCOUNTER
Pt scheduled a hospital follow up on 10/2.  She is not able to come before that as she is not driving, and she is seeing the specialist.  Tasked to Nursing as Fany Stovall

## 2017-09-21 NOTE — OPERATIVE REPORT
[facility]    PATIENT'S NAME: Elva Vazquez   ATTENDING PHYSICIAN: Swati Lester MD   OPERATING PHYSICIAN: Laura Ordonez.  Dayday Lynne MD   PATIENT ACCOUNT#:   [id number]    LOCATION:  [address] [place] [address] RECORD #:   [id number]       DATE fascia. We had good fascia in most directions; however, inferolaterally toward the patient's prior repair was a second defect and a mesh that seemed to be not adhered.   Despite the mesh being quite old, it seemed to not have adhered in many spots, and the

## 2017-10-03 ENCOUNTER — APPOINTMENT (OUTPATIENT)
Dept: LAB | Age: 62
End: 2017-10-03
Attending: INTERNAL MEDICINE
Payer: COMMERCIAL

## 2017-10-03 DIAGNOSIS — E53.8 VITAMIN B 12 DEFICIENCY: ICD-10-CM

## 2017-10-03 PROCEDURE — 82607 VITAMIN B-12: CPT

## 2017-10-03 PROCEDURE — 36415 COLL VENOUS BLD VENIPUNCTURE: CPT

## 2017-10-06 ENCOUNTER — APPOINTMENT (OUTPATIENT)
Dept: LAB | Age: 62
End: 2017-10-06
Attending: INTERNAL MEDICINE
Payer: COMMERCIAL

## 2017-10-06 ENCOUNTER — TELEPHONE (OUTPATIENT)
Dept: INTERNAL MEDICINE CLINIC | Facility: CLINIC | Age: 62
End: 2017-10-06

## 2017-10-06 ENCOUNTER — OFFICE VISIT (OUTPATIENT)
Dept: INTERNAL MEDICINE CLINIC | Facility: CLINIC | Age: 62
End: 2017-10-06

## 2017-10-06 VITALS
OXYGEN SATURATION: 97 % | BODY MASS INDEX: 45.2 KG/M2 | TEMPERATURE: 98 F | SYSTOLIC BLOOD PRESSURE: 128 MMHG | HEART RATE: 84 BPM | WEIGHT: 245.63 LBS | DIASTOLIC BLOOD PRESSURE: 70 MMHG | HEIGHT: 62 IN

## 2017-10-06 DIAGNOSIS — R92.8 ABNORMAL MAMMOGRAM: ICD-10-CM

## 2017-10-06 DIAGNOSIS — K43.6 VENTRAL HERNIA WITH OBSTRUCTION: ICD-10-CM

## 2017-10-06 DIAGNOSIS — E87.6 HYPOKALEMIA: ICD-10-CM

## 2017-10-06 DIAGNOSIS — E87.6 HYPOKALEMIA: Primary | ICD-10-CM

## 2017-10-06 DIAGNOSIS — K56.609 SBO (SMALL BOWEL OBSTRUCTION) (HCC): ICD-10-CM

## 2017-10-06 DIAGNOSIS — Z00.00 ROUTINE HEALTH MAINTENANCE: ICD-10-CM

## 2017-10-06 PROCEDURE — 99214 OFFICE O/P EST MOD 30 MIN: CPT | Performed by: INTERNAL MEDICINE

## 2017-10-06 PROCEDURE — 99212 OFFICE O/P EST SF 10 MIN: CPT | Performed by: INTERNAL MEDICINE

## 2017-10-06 PROCEDURE — 36415 COLL VENOUS BLD VENIPUNCTURE: CPT

## 2017-10-06 PROCEDURE — 80048 BASIC METABOLIC PNL TOTAL CA: CPT

## 2017-10-06 RX ORDER — ACETAMINOPHEN 500 MG
500 TABLET ORAL EVERY 6 HOURS PRN
COMMUNITY

## 2017-10-06 NOTE — PROGRESS NOTES
HPI:   Eddie Morales is a 60year old female presents with the following problems. Patient feels well. She presented the hospital emergently with small bowel obstruction secondary to ventral hernia with obstruction and without gangrene.   She had a daily. Disp:  Rfl:    ELIQUIS 5 MG Oral Tab Take 1 tablet by mouth 2 (two) times daily. Disp:  Rfl: 0   Triamterene-HCTZ 37.5-25 MG Oral Cap Take 1 capsule by mouth daily.  Disp: 90 capsule Rfl: 3   Albuterol Sulfate HFA (PROAIR HFA) 108 (90 BASE) MCG/ACT Sister        Results for orders placed or performed in visit on 10/03/17  -VITAMIN B12   Result Value Ref Range   Vitamin B12 265 181 - 914 pg/mL      Social History:   Smoking status: Former Smoker BMP.    2. SBO (small bowel obstruction)  Recovered. 3. Ventral hernia with obstruction  Recovering. Patient has follow-up with Dr. Jimbo Colón in November. 4. Abnormal mammogram  Last mammogram back to baseline and unremarkable per patient.   She is

## 2017-10-08 ENCOUNTER — TELEPHONE (OUTPATIENT)
Dept: INTERNAL MEDICINE CLINIC | Facility: CLINIC | Age: 62
End: 2017-10-08

## 2017-10-08 DIAGNOSIS — E87.6 HYPOKALEMIA: Primary | ICD-10-CM

## 2017-10-08 NOTE — TELEPHONE ENCOUNTER
Please notify patient her potassium still low. Not bad. It probably will come up with 1 banana or 8 oz OJ a day. Recheck BMP 10 days. Order in place.  This way we can avoid a prescription KCL

## 2017-10-09 NOTE — TELEPHONE ENCOUNTER
Patient called back. Relayed Dr Esteves Living message to her. She verbalized understanding. Lab order placed for BMP. She requested the lab orders be mailed to her. Lab order from 10/6/17 mailed to patient's home address.

## 2017-10-20 ENCOUNTER — APPOINTMENT (OUTPATIENT)
Dept: LAB | Age: 62
End: 2017-10-20
Attending: INTERNAL MEDICINE
Payer: COMMERCIAL

## 2017-10-20 DIAGNOSIS — E87.6 HYPOKALEMIA: ICD-10-CM

## 2017-10-20 PROCEDURE — 80048 BASIC METABOLIC PNL TOTAL CA: CPT

## 2017-10-20 PROCEDURE — 36415 COLL VENOUS BLD VENIPUNCTURE: CPT

## 2017-10-20 RX ORDER — TRIAMTERENE AND HYDROCHLOROTHIAZIDE 37.5; 25 MG/1; MG/1
CAPSULE ORAL
Qty: 90 CAPSULE | Refills: 3 | Status: CANCELLED | OUTPATIENT
Start: 2017-10-20

## 2017-10-22 ENCOUNTER — TELEPHONE (OUTPATIENT)
Dept: INTERNAL MEDICINE CLINIC | Facility: CLINIC | Age: 62
End: 2017-10-22

## 2017-10-22 DIAGNOSIS — E87.6 HYPOKALEMIA: Primary | ICD-10-CM

## 2017-10-23 RX ORDER — TRIAMTERENE AND HYDROCHLOROTHIAZIDE 37.5; 25 MG/1; MG/1
1 CAPSULE ORAL DAILY
Qty: 90 CAPSULE | Refills: 3 | Status: SHIPPED | OUTPATIENT
Start: 2017-10-23 | End: 2018-07-17

## 2017-10-23 NOTE — TELEPHONE ENCOUNTER
Spoke with patient and relayed Dr. Dillon Do message. Patient verbalized understanding. To Dr. Giovani Stearns - please advise. Patient is asking if she can go back taking her triamterene-HCTZ 37.5-25 mg.  Per patient, it was put on hold due to her potassium leve

## 2017-10-23 NOTE — TELEPHONE ENCOUNTER
Yes.  Okay to go back on trampoline/hydrochlorthiazide. The better however check a BMP in about 3 weeks to make sure her potassium remains in good range. Supplement her potassium with orange juice or a banana a day. BMP order in place.

## 2017-10-23 NOTE — TELEPHONE ENCOUNTER
Spoke with patient and relayed Dr. Dillon Do message. Patient verbalized understanding. Rx for triamterene-HCTZ 37.5-25 mg sent to patient's pharmacy in [place], [place]. Patient notified.

## 2017-11-10 ENCOUNTER — PRIOR ORIGINAL RECORDS (OUTPATIENT)
Dept: OTHER | Age: 62
End: 2017-11-10

## 2017-11-14 ENCOUNTER — APPOINTMENT (OUTPATIENT)
Dept: LAB | Age: 62
End: 2017-11-14
Attending: INTERNAL MEDICINE
Payer: COMMERCIAL

## 2017-11-14 ENCOUNTER — TELEPHONE (OUTPATIENT)
Dept: INTERNAL MEDICINE CLINIC | Facility: CLINIC | Age: 62
End: 2017-11-14

## 2017-11-14 DIAGNOSIS — E87.6 HYPOKALEMIA: ICD-10-CM

## 2017-11-14 PROCEDURE — 36415 COLL VENOUS BLD VENIPUNCTURE: CPT

## 2017-11-14 PROCEDURE — 80048 BASIC METABOLIC PNL TOTAL CA: CPT

## 2017-11-15 NOTE — TELEPHONE ENCOUNTER
VM left relaying the message per HIPAA. Encouraged the patient to call back with any questions or concerns. Nothing further at this time.

## 2017-11-17 ENCOUNTER — OFFICE VISIT (OUTPATIENT)
Dept: INTERNAL MEDICINE CLINIC | Facility: CLINIC | Age: 62
End: 2017-11-17

## 2017-11-17 VITALS
DIASTOLIC BLOOD PRESSURE: 74 MMHG | WEIGHT: 248 LBS | TEMPERATURE: 98 F | HEIGHT: 62 IN | SYSTOLIC BLOOD PRESSURE: 110 MMHG | OXYGEN SATURATION: 98 % | BODY MASS INDEX: 45.64 KG/M2 | HEART RATE: 80 BPM

## 2017-11-17 DIAGNOSIS — R19.5 OCCULT BLOOD POSITIVE STOOL: ICD-10-CM

## 2017-11-17 DIAGNOSIS — I87.2 CHRONIC VENOUS INSUFFICIENCY: ICD-10-CM

## 2017-11-17 DIAGNOSIS — Z00.00 ROUTINE HEALTH MAINTENANCE: ICD-10-CM

## 2017-11-17 DIAGNOSIS — K56.609 SBO (SMALL BOWEL OBSTRUCTION) (HCC): Primary | ICD-10-CM

## 2017-11-17 DIAGNOSIS — R92.8 ABNORMAL MAMMOGRAM: ICD-10-CM

## 2017-11-17 LAB
CHOLESTEROL, TOTAL: 205 MG/DL
HDL CHOLESTEROL: 63 MG/DL
LDL CHOLESTEROL: 125 MG/DL
TRIGLYCERIDES: 86 MG/DL

## 2017-11-17 PROCEDURE — 99212 OFFICE O/P EST SF 10 MIN: CPT | Performed by: INTERNAL MEDICINE

## 2017-11-17 PROCEDURE — 99214 OFFICE O/P EST MOD 30 MIN: CPT | Performed by: INTERNAL MEDICINE

## 2017-11-17 NOTE — PROGRESS NOTES
HPI:   Brenda Nguyen is a 60year old female presents with the following problems. Patient doing well from her ventral abdominal surgery repair. She is finished with follow-ups with Dr. Emilee Sanchez.     She still has some pinching and tightness in t lungs every 4 (four) hours as needed. Disp: 2 Inhaler Rfl: PRN   Azelaic Acid (FINACEA) 15 % Apply Externally Gel Apply  topically 2 (two) times daily as needed. Disp:  Rfl:    Vitamin C (VITAMIN C) 500 MG Oral Tab Take 1 tablet by mouth every other day.  D 0. 84 0.50 - 1.50 mg/dL   Calcium, Total 9.6 8.5 - 10.5 mg/dL   BUN/CREA Ratio 16.7 10.0 - 20.0   Anion Gap 9 0 - 18 mmol/L   Calculated Osmolality 286 275 - 295 mOsm/kg   GFR, Non-African American >60 >=60   GFR, -American >60 >=60      Social Histo on her veins. NEURO:  Awake and aware. ASSESSMENT AND PLAN:         1. SBO (small bowel obstruction)  Status post surgery. Repair of ventral abdominal hernia. 2. Abnormal mammogram  Followed at [facility]    3.  Routine health

## 2017-11-30 ENCOUNTER — TELEPHONE (OUTPATIENT)
Dept: INTERNAL MEDICINE CLINIC | Facility: CLINIC | Age: 62
End: 2017-11-30

## 2017-11-30 NOTE — TELEPHONE ENCOUNTER
Left message on pt's home phone instructing her to call Dr. Moraima Iqbal' office to schedule colonoscopy.

## 2017-11-30 NOTE — TELEPHONE ENCOUNTER
Yuli Freitas. I have sent a message for my staff to call patient but it might be wise for your staff also to call patient so she is prompted to see you and get the colonoscopy done. Thank you. John Bertrand.

## 2017-11-30 NOTE — TELEPHONE ENCOUNTER
Please call patient. Please let her know that Dr. Socorro Goddardes me that she can go ahead and schedule her colonoscopy. Please ask her to call Dr. Nohemy Rivas to schedule colonoscopy. Pa Hendrickson.   Back in September 2016 I did DEBORAH and patient had

## 2017-12-08 NOTE — TELEPHONE ENCOUNTER
Pt. Called to confirm that she did receive Dr. Michelle Riggs message. She will make an appt.  With Dr. Scot Montano in the beginning of January

## 2017-12-08 NOTE — TELEPHONE ENCOUNTER
vml  Checking to make  Sure pt received message of 11-30  To call DR Schroeder of to make appt to schedule a colonosocopy/ DR TOBIN  Please call ofc to see if appt has been made

## 2018-05-30 ENCOUNTER — PRIOR ORIGINAL RECORDS (OUTPATIENT)
Dept: OTHER | Age: 63
End: 2018-05-30

## 2018-05-30 ENCOUNTER — MYAURORA ACCOUNT LINK (OUTPATIENT)
Dept: OTHER | Age: 63
End: 2018-05-30

## 2018-06-13 ENCOUNTER — TELEPHONE (OUTPATIENT)
Dept: INTERNAL MEDICINE CLINIC | Facility: CLINIC | Age: 63
End: 2018-06-13

## 2018-06-13 DIAGNOSIS — E61.2 MAGNESIUM DEFICIENCY: ICD-10-CM

## 2018-06-13 DIAGNOSIS — Z11.59 ENCOUNTER FOR HEPATITIS C SCREENING TEST FOR LOW RISK PATIENT: ICD-10-CM

## 2018-06-13 DIAGNOSIS — Z00.00 ROUTINE ADULT HEALTH MAINTENANCE: Primary | ICD-10-CM

## 2018-06-13 DIAGNOSIS — E78.5 HYPERLIPIDEMIA, UNSPECIFIED HYPERLIPIDEMIA TYPE: ICD-10-CM

## 2018-06-13 DIAGNOSIS — E53.8 VITAMIN B 12 DEFICIENCY: ICD-10-CM

## 2018-06-13 DIAGNOSIS — Z86.39 H/O VITAMIN D DEFICIENCY: ICD-10-CM

## 2018-06-13 NOTE — TELEPHONE ENCOUNTER
Pt like a lab order for her yearly physical mailed to her home apt is on 7/117. Pt also stated she gets some extra labs done Dr.K hanks have that in the system.

## 2018-06-14 ENCOUNTER — OFFICE VISIT (OUTPATIENT)
Dept: GASTROENTEROLOGY | Facility: CLINIC | Age: 63
End: 2018-06-14

## 2018-06-14 ENCOUNTER — TELEPHONE (OUTPATIENT)
Dept: GASTROENTEROLOGY | Facility: CLINIC | Age: 63
End: 2018-06-14

## 2018-06-14 VITALS
HEART RATE: 81 BPM | BODY MASS INDEX: 46.56 KG/M2 | SYSTOLIC BLOOD PRESSURE: 121 MMHG | WEIGHT: 253 LBS | HEIGHT: 62 IN | DIASTOLIC BLOOD PRESSURE: 82 MMHG

## 2018-06-14 DIAGNOSIS — Z12.11 SCREENING FOR COLON CANCER: Primary | ICD-10-CM

## 2018-06-14 DIAGNOSIS — Z12.12 SCREENING FOR COLORECTAL CANCER: Primary | ICD-10-CM

## 2018-06-14 DIAGNOSIS — Z12.11 SCREENING FOR COLORECTAL CANCER: Primary | ICD-10-CM

## 2018-06-14 PROCEDURE — S0285 CNSLT BEFORE SCREEN COLONOSC: HCPCS | Performed by: INTERNAL MEDICINE

## 2018-06-14 RX ORDER — POLYETHYLENE GLYCOL 3350, SODIUM CHLORIDE, SODIUM BICARBONATE, POTASSIUM CHLORIDE 420; 11.2; 5.72; 1.48 G/4L; G/4L; G/4L; G/4L
4 POWDER, FOR SOLUTION ORAL ONCE
Qty: 4000 ML | Refills: 0 | Status: SHIPPED | OUTPATIENT
Start: 2018-06-14 | End: 2018-06-14

## 2018-06-14 NOTE — PATIENT INSTRUCTIONS
1.  Schedule screening colonoscopy following a split dose TriLyte preparation and monitored anesthesia care. 2.  We will contact Dr. Rosales  office regarding holding your Eliquis for 2 days preceding the procedure.

## 2018-06-14 NOTE — TELEPHONE ENCOUNTER
Scheduled for:  Colonoscopy 53536  Provider Name:  Antonette Zucker [address]  Date:  8/7/18  Location:  [facility]  Sedation:  MAC  Time:  8:00 am, arrival 7:00 am  Prep: Trilyte  Meds/Allergies Reconciled?:  Physician reviewed,  2.   We will contact Dr. Ada Warren office regarding ho

## 2018-06-14 NOTE — TELEPHONE ENCOUNTER
Spoke to Sumi Espinoza at [facility] ([phone]), provided her CPT code, provider NPI, DX code, DOS, and reviewed to be done at [facility] d/t BMI. She states the case has been approved with authorization [id number], call reference number .     Request for Eliquis

## 2018-06-14 NOTE — TELEPHONE ENCOUNTER
GI RN's    Pt is scheduled for colonoscopy on 8/7/18 with Dr. Barbara Ortiz at [address] [place] [address].     1) pt is on Eliquis and Dr. Barbara Ortiz would like Dr. Andres Sheppard office to be contacted  \"We will contact Dr. Andres Sheppard office regarding holding your Eliquis for 2 days preceding the proce

## 2018-06-14 NOTE — PROGRESS NOTES
HPI:    Patient ID: Kofi Melgar is a 60year old female. 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 She has not had a prior colonoscopy.     Per Dr. Cory Yu notes the patie Prescriptions:  PEG 3350-KCl-Na Bicarb-NaCl (TRILYTE) 420 g Oral Recon Soln Take 4,000 mL (4 L total) by mouth one time. Disp: 4000 mL Rfl: 0   PROAIR  (90 Base) MCG/ACT Inhalation Aero Soln INHALE 2 PUFFS INTO THE LUNGS EVERY 4  HOURS AS NEEDED.  Abdi Debbi Rash  Minocycline                 Comment:Other reaction(s): Trouble Breathing  Orphenadrine Citrate        Comment:Other reaction(s): shakey  Shellfish               UNKNOWN  Sulfanilamide               Comment:Other reaction(s): Trouble Breathing   PHYSI for this procedure, its nature, limitations and risks including bleeding and perforation requiring surgery was discussed. The risks of delayed diagnosis if testing is not performed was discussed as well.   We will query Dr. Montes Overall regarding holding the [place]

## 2018-06-15 ENCOUNTER — PRIOR ORIGINAL RECORDS (OUTPATIENT)
Dept: OTHER | Age: 63
End: 2018-06-15

## 2018-06-18 NOTE — TELEPHONE ENCOUNTER
Fax received from Dr. Philip Hassan office stating \"yes\" to hold eliquis x 2 days prior to [address] on 8/7/18. I spoke w/ Robinson Martínez RN at [phone] to confirm/clarify the orders.  She states that per Dr. Aman Navarrete, \"it's OK to hold Eliquis for 2 days prior to the

## 2018-06-18 NOTE — TELEPHONE ENCOUNTER
Pt contacted and reviewed Dr. Eulogio Lyon orders below, she repeated instructions to demonstrate understanding. She is aware she is to take her BP/HR meds prior to the procedure w/ a small sip of water.  She verbalized understanding of all and did not

## 2018-06-20 NOTE — TELEPHONE ENCOUNTER
Lab orders pended. Pt notified that Dr. Giselle Lund is out of the office until Monday and is okay with waiting until then for this request to be addressed. Requested call on Monday to notify that lab orders have been put in the lab to her home.

## 2018-06-20 NOTE — TELEPHONE ENCOUNTER
Pt stated she also gets Magnesium Vitamin B12 Vitamin D and TSH. Like this order also mailed to her home.  Please advise

## 2018-06-21 NOTE — TELEPHONE ENCOUNTER
Called and left a VM notifying patient that extra labs have been entered and will be mailed to her home address (done)

## 2018-07-11 ENCOUNTER — LAB ENCOUNTER (OUTPATIENT)
Dept: LAB | Age: 63
End: 2018-07-11
Attending: INTERNAL MEDICINE
Payer: COMMERCIAL

## 2018-07-11 ENCOUNTER — PRIOR ORIGINAL RECORDS (OUTPATIENT)
Dept: OTHER | Age: 63
End: 2018-07-11

## 2018-07-11 DIAGNOSIS — Z11.59 ENCOUNTER FOR HEPATITIS C SCREENING TEST FOR LOW RISK PATIENT: ICD-10-CM

## 2018-07-11 DIAGNOSIS — Z00.00 ROUTINE ADULT HEALTH MAINTENANCE: ICD-10-CM

## 2018-07-11 DIAGNOSIS — E78.5 HYPERLIPIDEMIA, UNSPECIFIED HYPERLIPIDEMIA TYPE: ICD-10-CM

## 2018-07-11 DIAGNOSIS — E53.8 VITAMIN B 12 DEFICIENCY: ICD-10-CM

## 2018-07-11 DIAGNOSIS — Z86.39 H/O VITAMIN D DEFICIENCY: ICD-10-CM

## 2018-07-11 LAB
ALBUMIN SERPL BCP-MCNC: 4 G/DL (ref 3.5–4.8)
ALBUMIN/GLOB SERPL: 1 {RATIO} (ref 1–2)
ALP SERPL-CCNC: 62 U/L (ref 32–100)
ALT SERPL-CCNC: 15 U/L (ref 14–54)
ANION GAP SERPL CALC-SCNC: 10 MMOL/L (ref 0–18)
AST SERPL-CCNC: 26 U/L (ref 15–41)
BASOPHILS # BLD: 0 K/UL (ref 0–0.2)
BASOPHILS NFR BLD: 1 %
BILIRUB SERPL-MCNC: 1.1 MG/DL (ref 0.3–1.2)
BUN SERPL-MCNC: 11 MG/DL (ref 8–20)
BUN/CREAT SERPL: 12.2 (ref 10–20)
CALCIUM SERPL-MCNC: 9.5 MG/DL (ref 8.5–10.5)
CHLORIDE SERPL-SCNC: 102 MMOL/L (ref 95–110)
CHOLEST SERPL-MCNC: 213 MG/DL (ref 110–200)
CO2 SERPL-SCNC: 26 MMOL/L (ref 22–32)
CREAT SERPL-MCNC: 0.9 MG/DL (ref 0.5–1.5)
EOSINOPHIL # BLD: 0.2 K/UL (ref 0–0.7)
EOSINOPHIL NFR BLD: 4 %
ERYTHROCYTE [DISTWIDTH] IN BLOOD BY AUTOMATED COUNT: 13.2 % (ref 11–15)
GLOBULIN PLAS-MCNC: 3.9 G/DL (ref 2.5–3.7)
GLUCOSE SERPL-MCNC: 94 MG/DL (ref 70–99)
HCT VFR BLD AUTO: 42.9 % (ref 35–48)
HDLC SERPL-MCNC: 71 MG/DL
HGB BLD-MCNC: 14.5 G/DL (ref 12–16)
LDLC SERPL CALC-MCNC: 126 MG/DL (ref 0–99)
LYMPHOCYTES # BLD: 1.3 K/UL (ref 1–4)
LYMPHOCYTES NFR BLD: 28 %
MAGNESIUM SERPL-MCNC: 1.9 MG/DL (ref 1.8–2.5)
MCH RBC QN AUTO: 31.5 PG (ref 27–32)
MCHC RBC AUTO-ENTMCNC: 33.9 G/DL (ref 32–37)
MCV RBC AUTO: 93.1 FL (ref 80–100)
MONOCYTES # BLD: 0.5 K/UL (ref 0–1)
MONOCYTES NFR BLD: 11 %
NEUTROPHILS # BLD AUTO: 2.6 K/UL (ref 1.8–7.7)
NEUTROPHILS NFR BLD: 57 %
NONHDLC SERPL-MCNC: 142 MG/DL
OSMOLALITY UR CALC.SUM OF ELEC: 285 MOSM/KG (ref 275–295)
PATIENT FASTING: YES
PLATELET # BLD AUTO: 211 K/UL (ref 140–400)
PMV BLD AUTO: 9.2 FL (ref 7.4–10.3)
POTASSIUM SERPL-SCNC: 3.4 MMOL/L (ref 3.3–5.1)
PROT SERPL-MCNC: 7.9 G/DL (ref 5.9–8.4)
RBC # BLD AUTO: 4.61 M/UL (ref 3.7–5.4)
SODIUM SERPL-SCNC: 138 MMOL/L (ref 136–144)
TRIGL SERPL-MCNC: 81 MG/DL (ref 1–149)
TSH SERPL-ACNC: 1.75 UIU/ML (ref 0.45–5.33)
VIT B12 SERPL-MCNC: 492 PG/ML (ref 181–914)
WBC # BLD AUTO: 4.6 K/UL (ref 4–11)

## 2018-07-11 PROCEDURE — 84443 ASSAY THYROID STIM HORMONE: CPT

## 2018-07-11 PROCEDURE — 83735 ASSAY OF MAGNESIUM: CPT

## 2018-07-11 PROCEDURE — 80061 LIPID PANEL: CPT

## 2018-07-11 PROCEDURE — 36415 COLL VENOUS BLD VENIPUNCTURE: CPT

## 2018-07-11 PROCEDURE — 80053 COMPREHEN METABOLIC PANEL: CPT

## 2018-07-11 PROCEDURE — 82306 VITAMIN D 25 HYDROXY: CPT

## 2018-07-11 PROCEDURE — 86803 HEPATITIS C AB TEST: CPT

## 2018-07-11 PROCEDURE — 85025 COMPLETE CBC W/AUTO DIFF WBC: CPT

## 2018-07-11 PROCEDURE — 82607 VITAMIN B-12: CPT

## 2018-07-12 LAB — HCV AB SERPL QL IA: NONREACTIVE

## 2018-07-13 LAB — 25(OH)D3 SERPL-MCNC: 29.1 NG/ML

## 2018-07-17 ENCOUNTER — OFFICE VISIT (OUTPATIENT)
Dept: INTERNAL MEDICINE CLINIC | Facility: CLINIC | Age: 63
End: 2018-07-17
Payer: COMMERCIAL

## 2018-07-17 VITALS
DIASTOLIC BLOOD PRESSURE: 80 MMHG | OXYGEN SATURATION: 98 % | HEART RATE: 91 BPM | HEIGHT: 62 IN | SYSTOLIC BLOOD PRESSURE: 138 MMHG | TEMPERATURE: 98 F | WEIGHT: 253.19 LBS | BODY MASS INDEX: 46.59 KG/M2

## 2018-07-17 DIAGNOSIS — R92.8 ABNORMAL MAMMOGRAM: ICD-10-CM

## 2018-07-17 DIAGNOSIS — I48.91 ATRIAL FIBRILLATION, UNSPECIFIED TYPE (HCC): Primary | ICD-10-CM

## 2018-07-17 DIAGNOSIS — I87.2 CHRONIC VENOUS INSUFFICIENCY: ICD-10-CM

## 2018-07-17 DIAGNOSIS — Z00.00 ROUTINE HEALTH MAINTENANCE: ICD-10-CM

## 2018-07-17 DIAGNOSIS — Z23 NEED FOR SHINGLES VACCINE: ICD-10-CM

## 2018-07-17 PROCEDURE — 99214 OFFICE O/P EST MOD 30 MIN: CPT | Performed by: INTERNAL MEDICINE

## 2018-07-17 PROCEDURE — 99212 OFFICE O/P EST SF 10 MIN: CPT | Performed by: INTERNAL MEDICINE

## 2018-07-17 RX ORDER — TRIAMTERENE AND HYDROCHLOROTHIAZIDE 37.5; 25 MG/1; MG/1
1 CAPSULE ORAL DAILY
Qty: 90 CAPSULE | Refills: 3 | Status: SHIPPED | OUTPATIENT
Start: 2018-07-17 | End: 2019-02-07

## 2018-07-17 RX ORDER — ALBUTEROL SULFATE 90 UG/1
2 AEROSOL, METERED RESPIRATORY (INHALATION) EVERY 4 HOURS PRN
Qty: 2 INHALER | Status: SHIPPED | OUTPATIENT
Start: 2018-07-17 | End: 2019-02-07

## 2018-07-17 NOTE — PROGRESS NOTES
HPI:   Marivel Underwood is a 58year old female presents with the following problems. Patient feels well. She has no unusual complaints. She does have venous insufficiency. No significant lower extremity edema.   Varicosities noted lower extremiti needed for Pain. Disp:  Rfl:    DilTIAZem HCl ER Coated Beads 120 MG Oral Capsule SR 24 Hr Take 1 capsule (120 mg total) by mouth daily.  Disp: 30 capsule Rfl: 2   ibuprofen (ADVIL) 200 MG Oral Tab Take 200 mg by mouth every 6 (six) hours as needed for Pain Hypertension Sister    • Heart Attack Brother      MI   • Heart Surgery Brother      CABG   • Cataracts Sister        Results for orders placed or performed in visit on 37/96/87  -COMP METABOLIC PANEL (14)   Result Value Ref Range   Glucose 94 70 - 99 mg/d Neutrophil Absolute 2.6 1.8 - 7.7 K/UL   Lymphocyte Absolute 1.3 1.0 - 4.0 K/UL   Monocyte Absolute 0.5 0.0 - 1.0 K/UL   Eosinophil Absolute 0.2 0.0 - 0.7 K/UL   Basophil Absolute 0.0 0.0 - 0.2 K/UL      Social History:   Smoking status: Former Smoker aware.     ASSESSMENT AND PLAN:         1. Chronic venous insufficiency  Patient has chronic venous insufficiency. Stable. Chronic. No new symptoms. 2. Routine health maintenance  Patient to get colonoscopy. I did recommend Shingrix.   She will suppl

## 2018-08-03 ENCOUNTER — TELEPHONE (OUTPATIENT)
Dept: GASTROENTEROLOGY | Facility: CLINIC | Age: 63
End: 2018-08-03

## 2018-08-03 NOTE — TELEPHONE ENCOUNTER
Called patient back and she stated that she starts prep Monday. Was told by ppl that prep makes you vomit.  I stated she should not vomit from prep if you feel bloated or nauseated it could be b/c she is drinking prep too fast she should get up and walk josselin

## 2018-08-03 NOTE — TELEPHONE ENCOUNTER
Pt has ques re med prep for CL[facility] on 8/7-     Re colyte - what happens if she vomits during prep - and can orange flavored koolaid if needed -     Can she take meds with water -  Can leave message

## 2018-08-07 ENCOUNTER — SURGERY (OUTPATIENT)
Age: 63
End: 2018-08-07

## 2018-08-07 ENCOUNTER — ANESTHESIA (OUTPATIENT)
Dept: ENDOSCOPY | Facility: HOSPITAL | Age: 63
End: 2018-08-07
Payer: COMMERCIAL

## 2018-08-07 ENCOUNTER — HOSPITAL ENCOUNTER (OUTPATIENT)
Facility: HOSPITAL | Age: 63
Setting detail: HOSPITAL OUTPATIENT SURGERY
Discharge: HOME | End: 2018-08-07
Attending: INTERNAL MEDICINE | Admitting: INTERNAL MEDICINE
Payer: COMMERCIAL

## 2018-08-07 ENCOUNTER — TELEPHONE (OUTPATIENT)
Dept: GASTROENTEROLOGY | Facility: CLINIC | Age: 63
End: 2018-08-07

## 2018-08-07 ENCOUNTER — ANESTHESIA EVENT (OUTPATIENT)
Dept: ENDOSCOPY | Facility: HOSPITAL | Age: 63
End: 2018-08-07
Payer: COMMERCIAL

## 2018-08-07 VITALS
DIASTOLIC BLOOD PRESSURE: 65 MMHG | TEMPERATURE: 98 F | WEIGHT: 250 LBS | BODY MASS INDEX: 46.01 KG/M2 | OXYGEN SATURATION: 98 % | HEART RATE: 90 BPM | SYSTOLIC BLOOD PRESSURE: 103 MMHG | HEIGHT: 62 IN | RESPIRATION RATE: 26 BRPM

## 2018-08-07 DIAGNOSIS — Z12.11 SCREENING FOR COLON CANCER: ICD-10-CM

## 2018-08-07 PROCEDURE — 45378 DIAGNOSTIC COLONOSCOPY: CPT | Performed by: INTERNAL MEDICINE

## 2018-08-07 PROCEDURE — 0DJD8ZZ INSPECTION OF LOWER INTESTINAL TRACT, VIA NATURAL OR ARTIFICIAL OPENING ENDOSCOPIC: ICD-10-PCS | Performed by: INTERNAL MEDICINE

## 2018-08-07 RX ORDER — NALOXONE HYDROCHLORIDE 0.4 MG/ML
80 INJECTION, SOLUTION INTRAMUSCULAR; INTRAVENOUS; SUBCUTANEOUS AS NEEDED
Status: CANCELLED | OUTPATIENT
Start: 2018-08-07 | End: 2018-08-07

## 2018-08-07 RX ORDER — SODIUM CHLORIDE, SODIUM LACTATE, POTASSIUM CHLORIDE, CALCIUM CHLORIDE 600; 310; 30; 20 MG/100ML; MG/100ML; MG/100ML; MG/100ML
INJECTION, SOLUTION INTRAVENOUS CONTINUOUS
Status: CANCELLED | OUTPATIENT
Start: 2018-08-07

## 2018-08-07 RX ORDER — SODIUM CHLORIDE, SODIUM LACTATE, POTASSIUM CHLORIDE, CALCIUM CHLORIDE 600; 310; 30; 20 MG/100ML; MG/100ML; MG/100ML; MG/100ML
INJECTION, SOLUTION INTRAVENOUS CONTINUOUS
Status: DISCONTINUED | OUTPATIENT
Start: 2018-08-07 | End: 2018-08-07

## 2018-08-07 RX ADMIN — SODIUM CHLORIDE, SODIUM LACTATE, POTASSIUM CHLORIDE, CALCIUM CHLORIDE: 600; 310; 30; 20 INJECTION, SOLUTION INTRAVENOUS at 09:11:00

## 2018-08-07 RX ADMIN — SODIUM CHLORIDE, SODIUM LACTATE, POTASSIUM CHLORIDE, CALCIUM CHLORIDE: 600; 310; 30; 20 INJECTION, SOLUTION INTRAVENOUS at 08:30:00

## 2018-08-07 NOTE — TELEPHONE ENCOUNTER
10  Year colonoscopy recall placed in system per Dr. Maida Bianchi   . Colonoscopy done on 08/07/18  . Next due on 08/07/28 . Snapshot updated.

## 2018-08-07 NOTE — ANESTHESIA PREPROCEDURE EVALUATION
Anesthesia PreOp Note    HPI:     Eddie Morales is a 58year old female who presents for preoperative consultation requested by: Marisela Thorne MD    Date of Surgery: 8/7/2018    Procedure(s):  COLONOSCOPY  Indication: Screening for colon cance REPAIR ROTATOR CUFF,ACUTE      Comment: right  09/2017: VENTRAL HERNIA REPAIR      Prescriptions Prior to Admission:  Triamterene-HCTZ 37.5-25 MG Oral Cap Take 1 capsule by mouth daily.  Disp: 90 capsule Rfl: 3 8/6/2018   Albuterol Sulfate HFA (PROAIR HFA) Cefaclor                    Comment:Other reaction(s): Rash  Clarithromycin              Comment:Other reaction(s): Rash  Doxycycline                 Comment:Other reaction(s): Trouble Breathing  Doxycycline Hyclate         Comment:Other reaction 07/11/2018   GLU 94 07/11/2018   CA 9.5 07/11/2018          Vital Signs: Body mass index is 45.73 kg/m². height is 1.575 m (5' 2\") and weight is 113.4 kg (250 lb). Her blood pressure is 124/70 and her pulse is 100.  Her respiration is 27 (abnormal) and

## 2018-08-07 NOTE — OPERATIVE REPORT
[facility] Endoscopy Report      Date of Procedure:  08/07/18      Preoperative Diagnosis:  1. Colorectal cancer screening  2. Hemoccult-positive stool      Postoperative Diagnosis:  1. Diverticulosis left colon  2.   Internal hemorrhoids Diverticulosis left colon currently uncomplicated  2. Internal hemorrhoids with stigmata of bleeding    Recommendations:  1. High-fiber diet. 2.  May resume Eliquis. 3.  Screening colonoscopy in 10 years unless any new symptoms or signs are noted.

## 2018-08-07 NOTE — ANESTHESIA POSTPROCEDURE EVALUATION
Patient: Baldemar Degroot    Procedure Summary     Date:  08/07/18 Room / Location:  [address] [place] [address] ENDOSCOPY 05 / [address] [place] [address] ENDOSCOPY    Anesthesia Start:  0830 Anesthesia Stop:  3952    Procedure:  COLONOSCOPY (N/A ) Diagnosis:       Screening for colon cancer      (di

## 2018-08-07 NOTE — H&P
History & Physical Examination    Patient Name: Yesy Quiñonez  MRN: [id number]  [facility]: [id number]  YOB: 1955    Diagnosis: Colorectal cancer screening and Hemoccult-positive stool        Prescriptions Prior to Admission:  Triamterene-HCTZ Allergies:   Bioflex                   Cefaclor                    Comment:Other reaction(s): Rash  Clarithromycin              Comment:Other reaction(s): Rash  Doxycycline                 Comment:Other reaction(s): Trouble Breathing  Doxycycline Hyc Physical Exam is Normal If not normal, please explain:   ANUSHA Natalie.Fran ] [ Karin Rogers  Natalie.Fran ] [ Vinnie Renae Natalie.Fran ] [ Ar Horowitz Natalie.Fran ] [ Burleson Cap Natalei.Fran ] [ Jesus Riggs Natalie.Fran ] [ Yordan Bile        [ x ] I have discussed the risks and benefits and alternatives w

## 2018-11-28 ENCOUNTER — TELEPHONE (OUTPATIENT)
Dept: INTERNAL MEDICINE CLINIC | Facility: CLINIC | Age: 63
End: 2018-11-28

## 2018-11-28 NOTE — TELEPHONE ENCOUNTER
I don't see Amoxicillin on her allergy list. We can ask her if she has taken Amoxicillin in the past. She is an RN.

## 2018-11-28 NOTE — TELEPHONE ENCOUNTER
Pt is having some dental work done in December around 11th a root canal she is allergic to [facility] things, the dentist is asking what can pt take 3 days before or after to prevent infection.

## 2018-11-29 ENCOUNTER — TELEPHONE (OUTPATIENT)
Dept: INTERNAL MEDICINE CLINIC | Facility: CLINIC | Age: 63
End: 2018-11-29

## 2018-11-29 NOTE — TELEPHONE ENCOUNTER
To Dr. Benjamin Tamayo - pt not sure if she has ever been on amoxicillin. Per pt's long-time pharmacy, [facility]: pt is allergic to sulfa, ketolides, macrolides, tetracycline and penicillin.

## 2018-11-30 NOTE — TELEPHONE ENCOUNTER
Please asked patient if she remembers what her allergy to penicillin was. Was not an anaphylactic reaction where she needed to go to the emergency room with shortness of breath, mouth or tongue swelling or any other severe reaction.   [facility]

## 2018-11-30 NOTE — TELEPHONE ENCOUNTER
As FYI to DR. TOBIN - called patient  And relayed  Message - patient is not 100% sure , but she thinks that her reactions to both medications were itching and a little rash

## 2018-11-30 NOTE — TELEPHONE ENCOUNTER
I spoke with patient and she is agreeable to clindamycin and she will take probiotic as well. She wants to confirm that a probiotic will not interfere with her other medications.  She asked that Dr. Hernandez Aquino write for 3 days of medications prior to procedure

## 2018-11-30 NOTE — TELEPHONE ENCOUNTER
I think with all the reactions that are noted she can take clindamycin. 150 mg 3 times a day for the days that she needs antibiotic coverage. Please ask her to watch out for diarrhea and his clindamycin has been associated with C. difficile diarrhea.   Pa

## 2018-12-03 RX ORDER — CLINDAMYCIN HYDROCHLORIDE 150 MG/1
150 CAPSULE ORAL 3 TIMES DAILY
Qty: 21 CAPSULE | Refills: 0 | Status: SHIPPED | OUTPATIENT
Start: 2018-12-03 | End: 2019-05-02 | Stop reason: ALTCHOICE

## 2018-12-03 NOTE — TELEPHONE ENCOUNTER
Called patient and relayed Dr Cecilia Wright message to her.  She verbalized understanding and will call her dentist.

## 2018-12-03 NOTE — TELEPHONE ENCOUNTER
Please notify I sent a prescription over for her clindamycin. 3 times a day.   Please let her know that if she should develop any loose bowels or diarrhea she should stop the medication and call immediately as this medication sometimes is associated with C

## 2019-01-28 ENCOUNTER — TELEPHONE (OUTPATIENT)
Dept: INTERNAL MEDICINE CLINIC | Facility: CLINIC | Age: 64
End: 2019-01-28

## 2019-01-28 DIAGNOSIS — I10 ESSENTIAL HYPERTENSION: Primary | ICD-10-CM

## 2019-01-28 DIAGNOSIS — E56.9 VITAMIN DEFICIENCY: ICD-10-CM

## 2019-01-28 LAB
CHOLESTEROL, TOTAL: 213 MG/DL
HDL CHOLESTEROL: 71 MG/DL
LDL CHOLESTEROL: 126 MG/DL
NON-HDL CHOLESTEROL: 142 MG/DL
TRIGLYCERIDES: 81 MG/DL

## 2019-01-28 NOTE — TELEPHONE ENCOUNTER
Pt has appt on 2/7/19  Pt will go for labs prior, please be sure orders in the system  Please call pt when orders in place  Tasked to nursing

## 2019-02-04 ENCOUNTER — APPOINTMENT (OUTPATIENT)
Dept: LAB | Age: 64
End: 2019-02-04
Attending: INTERNAL MEDICINE
Payer: COMMERCIAL

## 2019-02-04 DIAGNOSIS — I10 ESSENTIAL HYPERTENSION: ICD-10-CM

## 2019-02-04 DIAGNOSIS — E56.9 VITAMIN DEFICIENCY: ICD-10-CM

## 2019-02-04 LAB
ALBUMIN SERPL BCP-MCNC: 3.8 G/DL (ref 3.5–4.8)
ALBUMIN/GLOB SERPL: 0.9 {RATIO} (ref 1–2)
ALP SERPL-CCNC: 71 U/L (ref 32–100)
ALT SERPL-CCNC: 23 U/L (ref 14–54)
ANION GAP SERPL CALC-SCNC: 11 MMOL/L (ref 0–18)
AST SERPL-CCNC: 34 U/L (ref 15–41)
BILIRUB SERPL-MCNC: 1.1 MG/DL (ref 0.3–1.2)
BUN SERPL-MCNC: 14 MG/DL (ref 8–20)
BUN/CREAT SERPL: 14.7 (ref 10–20)
CALCIUM SERPL-MCNC: 9.6 MG/DL (ref 8.5–10.5)
CHLORIDE SERPL-SCNC: 105 MMOL/L (ref 95–110)
CO2 SERPL-SCNC: 25 MMOL/L (ref 22–32)
CREAT SERPL-MCNC: 0.95 MG/DL (ref 0.5–1.5)
GLOBULIN PLAS-MCNC: 4.1 G/DL (ref 2.5–3.7)
GLUCOSE SERPL-MCNC: 99 MG/DL (ref 70–99)
MAGNESIUM SERPL-MCNC: 2 MG/DL (ref 1.8–2.5)
OSMOLALITY UR CALC.SUM OF ELEC: 293 MOSM/KG (ref 275–295)
PATIENT FASTING: NO
POTASSIUM SERPL-SCNC: 3.4 MMOL/L (ref 3.3–5.1)
PROT SERPL-MCNC: 7.9 G/DL (ref 5.9–8.4)
SODIUM SERPL-SCNC: 141 MMOL/L (ref 136–144)

## 2019-02-04 PROCEDURE — 80053 COMPREHEN METABOLIC PANEL: CPT

## 2019-02-04 PROCEDURE — 83735 ASSAY OF MAGNESIUM: CPT

## 2019-02-04 PROCEDURE — 82306 VITAMIN D 25 HYDROXY: CPT

## 2019-02-04 PROCEDURE — 36415 COLL VENOUS BLD VENIPUNCTURE: CPT

## 2019-02-06 LAB — 25(OH)D3 SERPL-MCNC: 25.3 NG/ML (ref 30–100)

## 2019-02-07 ENCOUNTER — OFFICE VISIT (OUTPATIENT)
Dept: INTERNAL MEDICINE CLINIC | Facility: CLINIC | Age: 64
End: 2019-02-07
Payer: COMMERCIAL

## 2019-02-07 ENCOUNTER — TELEPHONE (OUTPATIENT)
Dept: INTERNAL MEDICINE CLINIC | Facility: CLINIC | Age: 64
End: 2019-02-07

## 2019-02-07 VITALS
WEIGHT: 250 LBS | HEART RATE: 98 BPM | TEMPERATURE: 98 F | BODY MASS INDEX: 46.01 KG/M2 | SYSTOLIC BLOOD PRESSURE: 124 MMHG | HEIGHT: 62 IN | DIASTOLIC BLOOD PRESSURE: 80 MMHG | OXYGEN SATURATION: 98 %

## 2019-02-07 DIAGNOSIS — R77.1 ELEVATED SERUM GLOBULIN LEVEL: Primary | ICD-10-CM

## 2019-02-07 DIAGNOSIS — R92.8 ABNORMAL MAMMOGRAM: ICD-10-CM

## 2019-02-07 DIAGNOSIS — Z00.00 ROUTINE HEALTH MAINTENANCE: ICD-10-CM

## 2019-02-07 DIAGNOSIS — Z86.39 H/O VITAMIN D DEFICIENCY: ICD-10-CM

## 2019-02-07 DIAGNOSIS — I87.2 CHRONIC VENOUS INSUFFICIENCY: ICD-10-CM

## 2019-02-07 DIAGNOSIS — I48.91 ATRIAL FIBRILLATION, UNSPECIFIED TYPE (HCC): ICD-10-CM

## 2019-02-07 DIAGNOSIS — I10 ESSENTIAL HYPERTENSION: Primary | ICD-10-CM

## 2019-02-07 PROCEDURE — 99212 OFFICE O/P EST SF 10 MIN: CPT | Performed by: INTERNAL MEDICINE

## 2019-02-07 PROCEDURE — 99214 OFFICE O/P EST MOD 30 MIN: CPT | Performed by: INTERNAL MEDICINE

## 2019-02-07 RX ORDER — ALBUTEROL SULFATE 90 UG/1
2 AEROSOL, METERED RESPIRATORY (INHALATION) EVERY 4 HOURS PRN
Qty: 1 INHALER | Refills: 3 | Status: SHIPPED | OUTPATIENT
Start: 2019-02-07 | End: 2020-08-13

## 2019-02-07 RX ORDER — TRIAMTERENE AND HYDROCHLOROTHIAZIDE 37.5; 25 MG/1; MG/1
1 CAPSULE ORAL DAILY
Qty: 30 CAPSULE | Refills: 11 | Status: SHIPPED | OUTPATIENT
Start: 2019-02-07 | End: 2020-03-19

## 2019-02-07 NOTE — PROGRESS NOTES
HPI:   Julio Vera is a 58year old female presents with the following problems. Patient feels well. She has no unusual complaints. She has hypertension. Blood pressure under satisfactory control. She is atrial fibrillation. Controlled. DilTIAZem HCl ER Coated Beads 120 MG Oral Capsule SR 24 Hr Take 1 capsule (120 mg total) by mouth daily. Disp: 30 capsule Rfl: 2   ibuprofen (ADVIL) 200 MG Oral Tab Take 200 mg by mouth every 6 (six) hours as needed for Pain.  Disp:  Rfl:    Vitamin B-12 • REPAIR ROTATOR CUFF,ACUTE      right   • VENTRAL HERNIA REPAIR  2017      Family History   Problem Relation Age of Onset   • Pulmonary Disease Father         COPD   • Hypertension Father    • Other (Other) Father    • Skin cancer Sister          throat  LUNGS:  Voices no shortness of breath or cough  CARDIOVASCULAR:  Voices no chest pain or palpitations  GI:  Voices no abdominal pain, blood in stool, changes in bowels  :  Voices no dysuria or frequency  NEURO:  Voices no headaches or dizziness flu vaccine in Shingrix. 6. Chronic venous insufficiency  Stable. No edema. No ulceration. Follow-up in 6 months. Check immunoglobulins and immunofixation for her elevated globulin. Also urinalysis order given to her.     Ilan Vaz MD  2/7

## 2019-02-07 NOTE — TELEPHONE ENCOUNTER
Will generate order for immunofixation and immunoglobin's to follow-up on mildly elevated globulin of 4.1

## 2019-02-18 ENCOUNTER — PRIOR ORIGINAL RECORDS (OUTPATIENT)
Dept: OTHER | Age: 64
End: 2019-02-18

## 2019-02-18 ENCOUNTER — APPOINTMENT (OUTPATIENT)
Dept: LAB | Facility: HOSPITAL | Age: 64
End: 2019-02-18
Attending: INTERNAL MEDICINE
Payer: COMMERCIAL

## 2019-02-18 DIAGNOSIS — R77.1 ELEVATED SERUM GLOBULIN LEVEL: ICD-10-CM

## 2019-02-18 LAB
BACTERIA UR QL AUTO: NEGATIVE /HPF
BILIRUB UR QL: NEGATIVE
CLARITY UR: CLEAR
COLOR UR: YELLOW
GLUCOSE UR-MCNC: NEGATIVE MG/DL
HGB UR QL STRIP.AUTO: NEGATIVE
IGA SERPL-MCNC: 517 MG/DL (ref 70–312)
IGM SERPL-MCNC: 84.3 MG/DL (ref 43–279)
IMMUNOGLOBULIN PNL SER-MCNC: 1530 MG/DL (ref 791–1643)
KETONES UR-MCNC: NEGATIVE MG/DL
NITRITE UR QL STRIP.AUTO: NEGATIVE
PH UR: 7 [PH] (ref 5–8)
PROT UR-MCNC: NEGATIVE MG/DL
RBC #/AREA URNS AUTO: <1 /HPF
SP GR UR STRIP: 1.01 (ref 1–1.03)
UROBILINOGEN UR STRIP-ACNC: <2
VIT C UR-MCNC: NEGATIVE MG/DL
WBC #/AREA URNS AUTO: 2 /HPF

## 2019-02-18 PROCEDURE — 36415 COLL VENOUS BLD VENIPUNCTURE: CPT

## 2019-02-18 PROCEDURE — 81001 URINALYSIS AUTO W/SCOPE: CPT | Performed by: INTERNAL MEDICINE

## 2019-02-18 PROCEDURE — 86334 IMMUNOFIX E-PHORESIS SERUM: CPT

## 2019-02-18 PROCEDURE — 82784 ASSAY IGA/IGD/IGG/IGM EACH: CPT

## 2019-02-22 ENCOUNTER — TELEPHONE (OUTPATIENT)
Dept: INTERNAL MEDICINE CLINIC | Facility: CLINIC | Age: 64
End: 2019-02-22

## 2019-02-22 NOTE — TELEPHONE ENCOUNTER
Please let patient know that her urinalysis came out good. No white cells or red cells in the urine. her dipstick showed some white cells but under the microscope she was fine. Her immunoglobin level a came out a little bit elevated.   I do not think th

## 2019-02-25 NOTE — TELEPHONE ENCOUNTER
Called patient and relayed Dr Kathy De Leon message to her. She verbalized understanding. Gave her contact information for Dr Elliott Muriel [phone].

## 2019-02-28 VITALS
BODY MASS INDEX: 47.39 KG/M2 | DIASTOLIC BLOOD PRESSURE: 70 MMHG | WEIGHT: 251 LBS | SYSTOLIC BLOOD PRESSURE: 144 MMHG | HEART RATE: 99 BPM | HEIGHT: 61 IN

## 2019-02-28 VITALS
HEART RATE: 62 BPM | BODY MASS INDEX: 46.44 KG/M2 | DIASTOLIC BLOOD PRESSURE: 64 MMHG | WEIGHT: 246 LBS | HEIGHT: 61 IN | RESPIRATION RATE: 18 BRPM | SYSTOLIC BLOOD PRESSURE: 134 MMHG

## 2019-02-28 VITALS
OXYGEN SATURATION: 99 % | WEIGHT: 251 LBS | HEIGHT: 61 IN | SYSTOLIC BLOOD PRESSURE: 132 MMHG | HEART RATE: 89 BPM | DIASTOLIC BLOOD PRESSURE: 66 MMHG | BODY MASS INDEX: 47.39 KG/M2

## 2019-03-01 VITALS
RESPIRATION RATE: 18 BRPM | HEART RATE: 100 BPM | WEIGHT: 253 LBS | OXYGEN SATURATION: 96 % | DIASTOLIC BLOOD PRESSURE: 78 MMHG | HEIGHT: 61 IN | SYSTOLIC BLOOD PRESSURE: 126 MMHG | BODY MASS INDEX: 47.77 KG/M2

## 2019-04-24 RX ORDER — CLOPIDOGREL BISULFATE 75 MG/1
TABLET ORAL
COMMUNITY
End: 2019-04-26 | Stop reason: CLARIF

## 2019-04-24 RX ORDER — MULTIVITAMIN
CAPSULE ORAL
COMMUNITY
End: 2019-04-26 | Stop reason: CLARIF

## 2019-04-24 RX ORDER — B-COMPLEX WITH VITAMIN C
TABLET ORAL
COMMUNITY
End: 2019-04-26 | Stop reason: CLARIF

## 2019-04-24 RX ORDER — ASPIRIN 325 MG
TABLET ORAL
COMMUNITY
End: 2019-04-26 | Stop reason: CLARIF

## 2019-04-24 RX ORDER — METOPROLOL SUCCINATE 50 MG/1
TABLET, EXTENDED RELEASE ORAL
COMMUNITY
End: 2019-04-26 | Stop reason: CLARIF

## 2019-04-24 RX ORDER — NEOMYCIN/POLYMYXIN B/PRAMOXINE 3.5-10K-1
CREAM (GRAM) TOPICAL
COMMUNITY
End: 2019-04-26 | Stop reason: CLARIF

## 2019-04-24 RX ORDER — MULTIVITAMIN WITH IRON
TABLET ORAL
COMMUNITY
End: 2019-04-26 | Stop reason: CLARIF

## 2019-04-24 RX ORDER — CHLORAL HYDRATE 500 MG
CAPSULE ORAL
COMMUNITY
End: 2020-05-27 | Stop reason: ALTCHOICE

## 2019-04-26 RX ORDER — IBUPROFEN 200 MG
TABLET ORAL
COMMUNITY
End: 2020-05-27 | Stop reason: ALTCHOICE

## 2019-04-26 RX ORDER — TRIAMTERENE AND HYDROCHLOROTHIAZIDE 37.5; 25 MG/1; MG/1
TABLET ORAL
COMMUNITY

## 2019-04-26 RX ORDER — CYANOCOBALAMIN (VITAMIN B-12) 500 MCG
LOZENGE ORAL
COMMUNITY

## 2019-04-26 RX ORDER — ACETAMINOPHEN 500 MG
TABLET ORAL
COMMUNITY

## 2019-04-26 RX ORDER — DILTIAZEM HYDROCHLORIDE 120 MG/1
CAPSULE, COATED, EXTENDED RELEASE ORAL
COMMUNITY
End: 2019-10-09 | Stop reason: SDUPTHER

## 2019-05-02 ENCOUNTER — OFFICE VISIT (OUTPATIENT)
Dept: INTERNAL MEDICINE CLINIC | Facility: CLINIC | Age: 64
End: 2019-05-02
Payer: COMMERCIAL

## 2019-05-02 VITALS
HEART RATE: 60 BPM | BODY MASS INDEX: 45.82 KG/M2 | SYSTOLIC BLOOD PRESSURE: 110 MMHG | WEIGHT: 249 LBS | TEMPERATURE: 98 F | RESPIRATION RATE: 18 BRPM | DIASTOLIC BLOOD PRESSURE: 82 MMHG | OXYGEN SATURATION: 100 % | HEIGHT: 62 IN

## 2019-05-02 DIAGNOSIS — J06.9 UPPER RESPIRATORY TRACT INFECTION, UNSPECIFIED TYPE: Primary | ICD-10-CM

## 2019-05-02 DIAGNOSIS — I10 ESSENTIAL HYPERTENSION: ICD-10-CM

## 2019-05-02 DIAGNOSIS — Z88.9 MULTIPLE ALLERGIES: ICD-10-CM

## 2019-05-02 PROCEDURE — 99214 OFFICE O/P EST MOD 30 MIN: CPT | Performed by: INTERNAL MEDICINE

## 2019-05-02 PROCEDURE — 99212 OFFICE O/P EST SF 10 MIN: CPT | Performed by: INTERNAL MEDICINE

## 2019-05-02 RX ORDER — AMOXICILLIN 875 MG/1
875 TABLET, COATED ORAL 2 TIMES DAILY
Qty: 14 TABLET | Refills: 0 | Status: SHIPPED | OUTPATIENT
Start: 2019-05-02 | End: 2019-05-03

## 2019-05-02 NOTE — PROGRESS NOTES
Caesar Solorzano is a 58year old female. HPI:   Patient presents with:  Cough: Patient cough, hoars voice, dry throat, post nasal drip, sinus congestion, and bilateral ear clogged that started 2 weeks ago. Denies current pain.         Patient for about 0   Azelaic Acid (FINACEA) 15 % Apply Externally Gel Apply  topically 2 (two) times daily as needed. Disp:  Rfl:    Vitamin C (VITAMIN C) 500 MG Oral Tab Take 1 tablet by mouth every other day.  Disp:  Rfl:    Calcium Carb-Cholecalciferol (CALTRATE 600+D) 6 developed, well nourished, in no apparent distress  SKIN:  no rashes , no suspicious lesions  HEENT: atraumatic. TM's normal. Canals clear. Pharynx normal without exudate. EYES:  PERRL. Sclera anicteric.   NECK:  Supple,  no adenopathy,   LUNGS: Rhonchi

## 2019-05-03 ENCOUNTER — OFFICE VISIT (OUTPATIENT)
Dept: INTERNAL MEDICINE CLINIC | Facility: CLINIC | Age: 64
End: 2019-05-03
Payer: COMMERCIAL

## 2019-05-03 ENCOUNTER — TELEPHONE (OUTPATIENT)
Dept: INTERNAL MEDICINE CLINIC | Facility: CLINIC | Age: 64
End: 2019-05-03

## 2019-05-03 VITALS
OXYGEN SATURATION: 97 % | HEART RATE: 84 BPM | SYSTOLIC BLOOD PRESSURE: 140 MMHG | HEIGHT: 62 IN | BODY MASS INDEX: 45.82 KG/M2 | WEIGHT: 249 LBS | DIASTOLIC BLOOD PRESSURE: 80 MMHG | TEMPERATURE: 98 F

## 2019-05-03 DIAGNOSIS — J06.9 UPPER RESPIRATORY TRACT INFECTION, UNSPECIFIED TYPE: ICD-10-CM

## 2019-05-03 DIAGNOSIS — I48.91 ATRIAL FIBRILLATION, UNSPECIFIED TYPE (HCC): ICD-10-CM

## 2019-05-03 DIAGNOSIS — I10 ESSENTIAL HYPERTENSION: ICD-10-CM

## 2019-05-03 DIAGNOSIS — R21 RASH: Primary | ICD-10-CM

## 2019-05-03 PROCEDURE — 99214 OFFICE O/P EST MOD 30 MIN: CPT | Performed by: INTERNAL MEDICINE

## 2019-05-03 PROCEDURE — 99212 OFFICE O/P EST SF 10 MIN: CPT | Performed by: INTERNAL MEDICINE

## 2019-05-03 RX ORDER — PREDNISONE 10 MG/1
TABLET ORAL
Qty: 30 TABLET | Refills: 0 | Status: SHIPPED | OUTPATIENT
Start: 2019-05-03 | End: 2019-08-08

## 2019-05-03 NOTE — TELEPHONE ENCOUNTER
I spoke with patient and she took Amox 875 at 2:00 pm. And 10:00 p.m. Yesterday. Around 3:00 a.m. This morning she was watching tv and noticed her legs were red, warm and itchy. She put lotion on them.  She is calling because she thinks she is having an all

## 2019-05-03 NOTE — PROGRESS NOTES
Dania Duke is a 58year old female. HPI:   Patient presents with:  Swelling: BLE swelling and rash that started 3am today. was started on amoxicillin yesterday for bronchitis. She took first tablet yesterday at 2pm. c/o itching and burning.  Took Packs/day: 0.40        Years: 33.00        Pack years: 13.2        Quit date: 6/15/1974        Years since quittin.9      Smokeless tobacco: Never Used    Alcohol use:  Yes      Alcohol/week: 1.2 oz      Types: 2 Cans of beer per week      Comment: onc daily as needed.  Disp:  Rfl:        Allergies:    Amoxicillin             RASH  Bioflex                 SWELLING, JITTERY    Comment:Slurred speech, drooling  Doxycycline             SHORTNESS OF BREATH    Comment:Other reaction(s): Trouble Breathing  Doxy petechial-like lesions around patches of erythema suggest drug-induced eruption to amoxicillin; advise stop amoxicillin; since she has +pruritus, will give prednisone 50 mg po qD x2d, 40 mg po qD x2d, 30 mg po qD x2d, 20 mg po qD x2d, and 10 mg po qD x2d

## 2019-05-03 NOTE — TELEPHONE ENCOUNTER
Patient calling. Saw Dr. Mik Houston yesterday, prescribed Amoxicillin 875mg 2 times a day. Took two tablets yesterday. Feels she is having reaction. Lower legs, warm, red and itching. Was not like this yesterday. Has stopped taking Amoxicillin.     Tiana

## 2019-06-25 ENCOUNTER — TELEPHONE (OUTPATIENT)
Dept: INTERNAL MEDICINE CLINIC | Facility: CLINIC | Age: 64
End: 2019-06-25

## 2019-06-25 DIAGNOSIS — E55.9 VITAMIN D DEFICIENCY: ICD-10-CM

## 2019-06-25 DIAGNOSIS — E78.5 HYPERLIPIDEMIA, UNSPECIFIED HYPERLIPIDEMIA TYPE: ICD-10-CM

## 2019-06-25 DIAGNOSIS — R77.1 ELEVATED SERUM GLOBULIN LEVEL: Primary | ICD-10-CM

## 2019-06-25 DIAGNOSIS — E53.8 VITAMIN B 12 DEFICIENCY: ICD-10-CM

## 2019-06-25 NOTE — TELEPHONE ENCOUNTER
Yes.  Please cancel out my immunoglobin order now and please ask Indira Carballo to just call a couple weeks before her visit and I can place a new order so we get everything we want.

## 2019-06-25 NOTE — TELEPHONE ENCOUNTER
To Dr. Trey Alvarado - your message relayed to pt who verbalized understanding. Pt asking if this can wait until she sees you 8/8/19, she could have labs done at same time as appt?

## 2019-06-25 NOTE — TELEPHONE ENCOUNTER
Patient called and left detailed message stating it is okay to get done prior to 8/8/19 appointment. Instructed for patient to call 2 weeks ahead of time prior to appointment to get all orders. Patient to call back office for any further questions.  Order c

## 2019-06-25 NOTE — TELEPHONE ENCOUNTER
Please call patient and let her know that I had made a note to myself to follow-up on a mildly elevated immunoglobin level called IgA that we found back in February. I do not think this is a major problem but wanted to follow-up on this.   I left lab order

## 2019-07-26 NOTE — TELEPHONE ENCOUNTER
Patient is calling to have her lab order put in the system, as she has an appt on 8/8. She wants ordered:    IGA  Vitamin D  Vitamin B-12  Magnesium  TSH  Lipid  CMP  CBC    Patient will use an [address] [place] [address] facility.

## 2019-07-26 NOTE — TELEPHONE ENCOUNTER
Patient contacted and notified that fasting labs and urinalysis ordered by Alan Pizarro, order in the system.

## 2019-08-02 ENCOUNTER — LAB ENCOUNTER (OUTPATIENT)
Dept: LAB | Age: 64
End: 2019-08-02
Attending: INTERNAL MEDICINE
Payer: MEDICARE

## 2019-08-02 DIAGNOSIS — R77.1 ELEVATED SERUM GLOBULIN LEVEL: ICD-10-CM

## 2019-08-02 DIAGNOSIS — E78.5 HYPERLIPIDEMIA, UNSPECIFIED HYPERLIPIDEMIA TYPE: ICD-10-CM

## 2019-08-02 DIAGNOSIS — E53.8 VITAMIN B 12 DEFICIENCY: ICD-10-CM

## 2019-08-02 DIAGNOSIS — E55.9 VITAMIN D DEFICIENCY: ICD-10-CM

## 2019-08-02 LAB
ALBUMIN SERPL-MCNC: 3.8 G/DL
ALBUMIN SERPL-MCNC: 3.8 G/DL (ref 3.4–5)
ALBUMIN/GLOB SERPL: 0.9 {RATIO} (ref 1–2)
ALBUMIN/GLOB SERPL: NORMAL {RATIO}
ALP LIVER SERPL-CCNC: 68 U/L (ref 50–130)
ALP SERPL-CCNC: 68 U/L
ALT SERPL-CCNC: 23 U/L
ALT SERPL-CCNC: 23 U/L (ref 13–56)
ANION GAP SERPL CALC-SCNC: 9 MMOL/L
ANION GAP SERPL CALC-SCNC: 9 MMOL/L (ref 0–18)
AST SERPL-CCNC: 25 U/L
AST SERPL-CCNC: 25 U/L (ref 15–37)
BACTERIA UR QL AUTO: NEGATIVE /HPF
BASOPHILS # BLD AUTO: 0.07 X10(3) UL (ref 0–0.2)
BASOPHILS NFR BLD AUTO: 1.9 %
BILIRUB SERPL-MCNC: 1 MG/DL
BILIRUB SERPL-MCNC: 1 MG/DL (ref 0.1–2)
BILIRUB UR QL: NEGATIVE
BUN BLD-MCNC: 14 MG/DL (ref 7–18)
BUN SERPL-MCNC: 14 MG/DL
BUN/CREAT SERPL: 15.9 (ref 10–20)
BUN/CREAT SERPL: NORMAL
CALCIUM BLD-MCNC: 8.9 MG/DL (ref 8.5–10.1)
CALCIUM SERPL-MCNC: 8.9 MG/DL
CHLORIDE SERPL-SCNC: 103 MMOL/L
CHLORIDE SERPL-SCNC: 103 MMOL/L (ref 98–112)
CHOLEST SERPL-MCNC: 197 MG/DL
CHOLEST SMN-MCNC: 197 MG/DL (ref ?–200)
CHOLEST/HDLC SERPL: NORMAL {RATIO}
CLARITY UR: CLEAR
CO2 SERPL-SCNC: 28 MMOL/L
CO2 SERPL-SCNC: 28 MMOL/L (ref 21–32)
COLOR UR: YELLOW
CREAT BLD-MCNC: 0.88 MG/DL (ref 0.55–1.02)
CREAT SERPL-MCNC: 0.88 MG/DL
DEPRECATED RDW RBC AUTO: 42.3 FL (ref 35.1–46.3)
EOSINOPHIL # BLD AUTO: 0.1 X10(3) UL (ref 0–0.7)
EOSINOPHIL NFR BLD AUTO: 2.8 %
ERYTHROCYTE [DISTWIDTH] IN BLOOD BY AUTOMATED COUNT: 12.4 % (ref 11–15)
GLOBULIN PLAS-MCNC: 4.2 G/DL (ref 2.8–4.4)
GLOBULIN SER-MCNC: 4.2 G/DL
GLUCOSE BLD-MCNC: 93 MG/DL (ref 70–99)
GLUCOSE SERPL-MCNC: 93 MG/DL
GLUCOSE UR-MCNC: NEGATIVE MG/DL
HAV IGM SER QL: 2.2 MG/DL (ref 1.6–2.6)
HCT VFR BLD AUTO: 40.3 % (ref 35–48)
HDLC SERPL-MCNC: 77 MG/DL
HDLC SERPL-MCNC: 77 MG/DL (ref 40–59)
HGB BLD-MCNC: 13.6 G/DL (ref 12–16)
IGA SERPL-MCNC: 377 MG/DL (ref 70–312)
IGM SERPL-MCNC: 78.9 MG/DL (ref 43–279)
IMM GRANULOCYTES # BLD AUTO: 0.01 X10(3) UL (ref 0–1)
IMM GRANULOCYTES NFR BLD: 0.3 %
IMMUNOGLOBULIN PNL SER-MCNC: 1320 MG/DL (ref 791–1643)
KETONES UR-MCNC: NEGATIVE MG/DL
LDLC SERPL CALC-MCNC: 104 MG/DL
LDLC SERPL CALC-MCNC: 104 MG/DL (ref ?–100)
LENGTH OF FAST TIME PATIENT: YES H
LENGTH OF FAST TIME PATIENT: YES H
LYMPHOCYTES # BLD AUTO: 1.01 X10(3) UL (ref 1–4)
LYMPHOCYTES NFR BLD AUTO: 28 %
M PROTEIN MFR SERPL ELPH: 8 G/DL (ref 6.4–8.2)
MCH RBC QN AUTO: 31.3 PG (ref 26–34)
MCHC RBC AUTO-ENTMCNC: 33.7 G/DL (ref 31–37)
MCV RBC AUTO: 92.6 FL (ref 80–100)
MONOCYTES # BLD AUTO: 0.4 X10(3) UL (ref 0.1–1)
MONOCYTES NFR BLD AUTO: 11.1 %
NEUTROPHILS # BLD AUTO: 2.02 X10 (3) UL (ref 1.5–7.7)
NEUTROPHILS # BLD AUTO: 2.02 X10(3) UL (ref 1.5–7.7)
NEUTROPHILS NFR BLD AUTO: 55.9 %
NITRITE UR QL STRIP.AUTO: NEGATIVE
NONHDLC SERPL-MCNC: 120 MG/DL
NONHDLC SERPL-MCNC: 120 MG/DL (ref ?–130)
OSMOLALITY SERPL CALC.SUM OF ELEC: 290 MOSM/KG (ref 275–295)
PATIENT FASTING: YES
PATIENT FASTING: YES
PH UR: 7 [PH] (ref 5–8)
PLATELET # BLD AUTO: 202 10(3)UL (ref 150–450)
POTASSIUM SERPL-SCNC: 3.5 MMOL/L
POTASSIUM SERPL-SCNC: 3.5 MMOL/L (ref 3.5–5.1)
PROT SERPL-MCNC: 8 G/DL
PROT UR-MCNC: NEGATIVE MG/DL
RBC # BLD AUTO: 4.35 X10(6)UL (ref 3.8–5.3)
RBC #/AREA URNS AUTO: <1 /HPF
SODIUM SERPL-SCNC: 140 MMOL/L
SODIUM SERPL-SCNC: 140 MMOL/L (ref 136–145)
SP GR UR STRIP: 1.01 (ref 1–1.03)
TRIGL SERPL-MCNC: 79 MG/DL
TRIGL SERPL-MCNC: 79 MG/DL (ref 30–149)
UROBILINOGEN UR STRIP-ACNC: <2
VIT B12 SERPL-MCNC: 396 PG/ML (ref 193–986)
VIT C UR-MCNC: NEGATIVE MG/DL
VLDLC SERPL CALC-MCNC: 16 MG/DL
VLDLC SERPL CALC-MCNC: 16 MG/DL (ref 0–30)
WBC # BLD AUTO: 3.6 X10(3) UL (ref 4–11)
WBC #/AREA URNS AUTO: 1 /HPF

## 2019-08-02 PROCEDURE — 82607 VITAMIN B-12: CPT

## 2019-08-02 PROCEDURE — 81001 URINALYSIS AUTO W/SCOPE: CPT | Performed by: INTERNAL MEDICINE

## 2019-08-02 PROCEDURE — 83921 ORGANIC ACID SINGLE QUANT: CPT

## 2019-08-02 PROCEDURE — 80053 COMPREHEN METABOLIC PANEL: CPT

## 2019-08-02 PROCEDURE — 80061 LIPID PANEL: CPT

## 2019-08-02 PROCEDURE — 82784 ASSAY IGA/IGD/IGG/IGM EACH: CPT

## 2019-08-02 PROCEDURE — 86334 IMMUNOFIX E-PHORESIS SERUM: CPT

## 2019-08-02 PROCEDURE — 36415 COLL VENOUS BLD VENIPUNCTURE: CPT

## 2019-08-02 PROCEDURE — 82306 VITAMIN D 25 HYDROXY: CPT

## 2019-08-02 PROCEDURE — 83735 ASSAY OF MAGNESIUM: CPT

## 2019-08-02 PROCEDURE — 85025 COMPLETE CBC W/AUTO DIFF WBC: CPT

## 2019-08-05 LAB — 25(OH)D3 SERPL-MCNC: 26.4 NG/ML (ref 30–100)

## 2019-08-06 LAB — MMA: 0.15 UMOL/L

## 2019-08-08 ENCOUNTER — OFFICE VISIT (OUTPATIENT)
Dept: INTERNAL MEDICINE CLINIC | Facility: CLINIC | Age: 64
End: 2019-08-08
Payer: MEDICARE

## 2019-08-08 VITALS
HEART RATE: 98 BPM | WEIGHT: 247 LBS | OXYGEN SATURATION: 98 % | BODY MASS INDEX: 45 KG/M2 | SYSTOLIC BLOOD PRESSURE: 140 MMHG | TEMPERATURE: 98 F | DIASTOLIC BLOOD PRESSURE: 86 MMHG

## 2019-08-08 DIAGNOSIS — E55.9 VITAMIN D DEFICIENCY: ICD-10-CM

## 2019-08-08 DIAGNOSIS — D70.9 NEUTROPENIA, UNSPECIFIED TYPE (HCC): ICD-10-CM

## 2019-08-08 DIAGNOSIS — I83.90 SUPERFICIAL VARICOSITIES, UNSPECIFIED LATERALITY: ICD-10-CM

## 2019-08-08 DIAGNOSIS — I48.91 ATRIAL FIBRILLATION, UNSPECIFIED TYPE (HCC): ICD-10-CM

## 2019-08-08 DIAGNOSIS — I10 ESSENTIAL HYPERTENSION: Primary | ICD-10-CM

## 2019-08-08 DIAGNOSIS — M54.50 MIDLINE LOW BACK PAIN WITHOUT SCIATICA, UNSPECIFIED CHRONICITY: ICD-10-CM

## 2019-08-08 DIAGNOSIS — R21 RASH: ICD-10-CM

## 2019-08-08 DIAGNOSIS — R77.1 ELEVATED SERUM GLOBULIN LEVEL: ICD-10-CM

## 2019-08-08 PROCEDURE — G0463 HOSPITAL OUTPT CLINIC VISIT: HCPCS | Performed by: INTERNAL MEDICINE

## 2019-08-08 PROCEDURE — 99214 OFFICE O/P EST MOD 30 MIN: CPT | Performed by: INTERNAL MEDICINE

## 2019-08-08 RX ORDER — DILTIAZEM HYDROCHLORIDE 120 MG/1
120 CAPSULE, EXTENDED RELEASE ORAL DAILY
COMMUNITY

## 2019-08-08 RX ORDER — DILTIAZEM HYDROCHLORIDE 120 MG/1
120 CAPSULE, COATED, EXTENDED RELEASE ORAL DAILY
COMMUNITY
End: 2019-08-08

## 2019-08-08 NOTE — PROGRESS NOTES
HPI:   Tiera Crespo is a 71year old female presents with the following problems. Patient generally feels well. She does keep up with Dr. Vishnu Andrade at [facility] for her breast care. She is now down to a yearly visit. He orders her mammograms. Base) MCG/ACT Inhalation Aero Soln Inhale 2 puffs into the lungs every 4 (four) hours as needed for Wheezing. Disp: 1 Inhaler Rfl: 3   Triamterene-HCTZ 37.5-25 MG Oral Cap Take 1 capsule by mouth daily.  Disp: 30 capsule Rfl: 11   acetaminophen 500 MG Oral tab - DOS 03-   • HERNIA INCISIONAL REPAIR N/A 9/11/2017    Performed by Raul Cartwright MD at [address] [place] [address] OR   • HERNIA SURGERY  09/11/2017    Repair of incarcerated hernia by Dr Irasema Mesa      right   • VENTRAL H (H) 70.00 - 312.00 mg/dL    Immunoglobulin G 1,320 791-1,643 mg/dL    Immunoglobulin M 78.9 43.0 - 279.0 mg/dL   VITAMIN D, 25-HYDROXY   Result Value Ref Range    Vitamin D, 25OH, Total 26.4 (L) 30.0 - 100.0 ng/mL   MAGNESIUM   Result Value Ref Range    Ma Voices no shortness of breath or cough  CARDIOVASCULAR:  Voices no chest pain or palpitations  GI:  Voices no abdominal pain, blood in stool, changes in bowels  :  Voices no dysuria or frequency  MUSCULOSKELETAL:  See above  NEURO:  Voices no headaches necessary for now. Patient has no clinical symptoms of lumbar spinal stenosis. - PHYSICAL THERAPY EXTERNAL    6. Superficial varicosities, unspecified laterality  Refer to     7. Rash  Patient will monitor.     8. Neutropenia, unspecified type ([facility] [address]

## 2019-09-20 DIAGNOSIS — I83.813 VARICOSE VEINS OF BOTH LOWER EXTREMITIES WITH PAIN: Primary | ICD-10-CM

## 2019-10-09 RX ORDER — DILTIAZEM HYDROCHLORIDE 120 MG/1
CAPSULE, EXTENDED RELEASE ORAL
Qty: 30 CAPSULE | Refills: 4 | Status: SHIPPED | OUTPATIENT
Start: 2019-10-09 | End: 2020-02-17 | Stop reason: SDUPTHER

## 2019-10-23 ENCOUNTER — HOSPITAL ENCOUNTER (OUTPATIENT)
Dept: ULTRASOUND IMAGING | Facility: HOSPITAL | Age: 64
Discharge: HOME | End: 2019-10-23
Attending: RADIOLOGY
Payer: MEDICARE

## 2019-10-23 DIAGNOSIS — I83.813 VARICOSE VEINS OF BOTH LOWER EXTREMITIES WITH PAIN: ICD-10-CM

## 2019-10-23 PROCEDURE — 93970 EXTREMITY STUDY: CPT | Performed by: RADIOLOGY

## 2019-11-19 ENCOUNTER — LAB ENCOUNTER (OUTPATIENT)
Dept: LAB | Age: 64
End: 2019-11-19
Attending: INTERNAL MEDICINE
Payer: MEDICARE

## 2019-11-19 DIAGNOSIS — D70.9 NEUTROPENIA, UNSPECIFIED TYPE (HCC): ICD-10-CM

## 2019-11-19 LAB
ABSOLUTE IMMATURE GRANULOCYTES (OFFPRE24): NORMAL
BASO+EOS+MONOS # BLD: NORMAL 10*3/UL
BASO+EOS+MONOS NFR BLD: NORMAL %
BASOPHILS # BLD: NORMAL 10*3/UL
BASOPHILS NFR BLD: NORMAL %
DIFFERENTIAL METHOD BLD: NORMAL
EOSINOPHIL # BLD: NORMAL 10*3/UL
EOSINOPHIL NFR BLD: NORMAL %
ERYTHROCYTE [DISTWIDTH] IN BLOOD: NORMAL %
HCT VFR BLD CALC: 42.2 %
HGB BLD-MCNC: 14.2 G/DL
IMMATURE GRANULOCYTES (OFFPRE25): NORMAL
LYMPHOCYTES # BLD: NORMAL 10*3/UL
LYMPHOCYTES NFR BLD: NORMAL %
MCH RBC QN AUTO: 31.4 PG
MCHC RBC AUTO-ENTMCNC: 33.6 G/DL
MCV RBC AUTO: 93.4 FL
MONOCYTES # BLD: NORMAL 10*3/UL
MONOCYTES NFR BLD: NORMAL %
MPV (OFFPRE2): NORMAL
NEUTROPHILS # BLD: NORMAL 10*3/UL
NEUTROPHILS NFR BLD: NORMAL %
NRBC BLD MANUAL-RTO: NORMAL %
PLAT MORPH BLD: NORMAL
PLATELET # BLD: 208 10*3/UL
RBC # BLD: 4.52 10*6/UL
RBC MORPH BLD: NORMAL
WBC # BLD: 5.3 10*3/UL
WBC MORPH BLD: NORMAL

## 2019-11-19 PROCEDURE — 36415 COLL VENOUS BLD VENIPUNCTURE: CPT

## 2019-11-19 PROCEDURE — 85025 COMPLETE CBC W/AUTO DIFF WBC: CPT

## 2019-11-21 ENCOUNTER — TELEPHONE (OUTPATIENT)
Dept: INTERNAL MEDICINE CLINIC | Facility: CLINIC | Age: 64
End: 2019-11-21

## 2019-11-27 DIAGNOSIS — I87.2 VENOUS INSUFFICIENCY: Primary | ICD-10-CM

## 2020-01-10 ENCOUNTER — HOSPITAL ENCOUNTER (OUTPATIENT)
Dept: INTERVENTIONAL RADIOLOGY/VASCULAR | Facility: HOSPITAL | Age: 65
Discharge: HOME | End: 2020-01-10
Attending: RADIOLOGY | Admitting: RADIOLOGY
Payer: MEDICARE

## 2020-01-10 VITALS
DIASTOLIC BLOOD PRESSURE: 79 MMHG | OXYGEN SATURATION: 96 % | RESPIRATION RATE: 22 BRPM | WEIGHT: 249 LBS | HEART RATE: 81 BPM | BODY MASS INDEX: 46 KG/M2 | SYSTOLIC BLOOD PRESSURE: 121 MMHG

## 2020-01-10 DIAGNOSIS — I87.2 VENOUS INSUFFICIENCY: ICD-10-CM

## 2020-01-10 DIAGNOSIS — M79.89 LEG SWELLING: Primary | ICD-10-CM

## 2020-01-10 PROCEDURE — 37799 UNLISTED PX VASCULAR SURGERY: CPT

## 2020-01-10 PROCEDURE — 99152 MOD SED SAME PHYS/QHP 5/>YRS: CPT

## 2020-01-10 PROCEDURE — 36482 ENDOVEN THER CHEM ADHES 1ST: CPT

## 2020-01-10 PROCEDURE — 06DY0ZZ EXTRACTION OF LOWER VEIN, OPEN APPROACH: ICD-10-PCS | Performed by: RADIOLOGY

## 2020-01-10 PROCEDURE — 3E033TZ INTRODUCTION OF DESTRUCTIVE AGENT INTO PERIPHERAL VEIN, PERCUTANEOUS APPROACH: ICD-10-PCS | Performed by: RADIOLOGY

## 2020-01-10 PROCEDURE — 99153 MOD SED SAME PHYS/QHP EA: CPT

## 2020-01-10 RX ORDER — SODIUM TETRADECYL SULFATE 30 MG/ML
INJECTION, SOLUTION INTRAVENOUS
Status: COMPLETED
Start: 2020-01-10 | End: 2020-01-10

## 2020-01-10 RX ORDER — SODIUM CHLORIDE 9 MG/ML
INJECTION, SOLUTION INTRAVENOUS CONTINUOUS
Status: DISCONTINUED | OUTPATIENT
Start: 2020-01-10 | End: 2020-01-10

## 2020-01-10 RX ORDER — MIDAZOLAM HYDROCHLORIDE 1 MG/ML
INJECTION INTRAMUSCULAR; INTRAVENOUS
Status: COMPLETED
Start: 2020-01-10 | End: 2020-01-10

## 2020-01-10 RX ORDER — LIDOCAINE HYDROCHLORIDE 20 MG/ML
INJECTION, SOLUTION EPIDURAL; INFILTRATION; INTRACAUDAL; PERINEURAL
Status: COMPLETED
Start: 2020-01-10 | End: 2020-01-10

## 2020-01-10 NOTE — IVS NOTE
DISCHARGE NOTE     Pt is able to sit up and ambulate without difficulty. Pt tolerated fluids and food. Procedural site remains dry and intact   No signs and symptoms of bleeding/hematoma noted.    Instruction provided, patient/family verbalizes Migue Wise

## 2020-01-10 NOTE — PROCEDURES
[facility] HOSP - [facility]  Procedure Note    Caesar Solorzano Patient Status:  Outpatient in a Bed    1955 MRN [id number]   Location [facility] Attending Hernandez Oviedo MD   Hosp Day # 0 PCP Edmond Moreland MD

## 2020-01-10 NOTE — H&P
Khoi Cook Patient Status:  Outpatient in a Bed    1955 MRN [id number]   Location [facility] Attending Ricki Stewart MD   Hosp Day # 0 PCP [place], [place] Other (Other) Father    • Skin cancer Sister          of metastatic vulvar cancer   • Hypertension Sister    • Heart Attack Brother         MI   • Heart Surgery Brother         CABG   • Cataracts Sister        Allergies/Medications:    Allergies:    Union

## 2020-01-17 ENCOUNTER — HOSPITAL ENCOUNTER (OUTPATIENT)
Dept: ULTRASOUND IMAGING | Facility: HOSPITAL | Age: 65
Discharge: HOME | End: 2020-01-17
Attending: RADIOLOGY
Payer: MEDICARE

## 2020-01-17 DIAGNOSIS — M79.89 LEG SWELLING: ICD-10-CM

## 2020-01-17 PROCEDURE — 93971 EXTREMITY STUDY: CPT | Performed by: RADIOLOGY

## 2020-01-31 DIAGNOSIS — I87.2 VENOUS INSUFFICIENCY: Primary | ICD-10-CM

## 2020-02-07 ENCOUNTER — HOSPITAL ENCOUNTER (OUTPATIENT)
Dept: INTERVENTIONAL RADIOLOGY/VASCULAR | Facility: HOSPITAL | Age: 65
Discharge: HOME | End: 2020-02-07
Attending: RADIOLOGY | Admitting: RADIOLOGY
Payer: MEDICARE

## 2020-02-07 VITALS
HEART RATE: 78 BPM | BODY MASS INDEX: 46 KG/M2 | RESPIRATION RATE: 11 BRPM | WEIGHT: 249 LBS | DIASTOLIC BLOOD PRESSURE: 68 MMHG | SYSTOLIC BLOOD PRESSURE: 104 MMHG | OXYGEN SATURATION: 97 %

## 2020-02-07 DIAGNOSIS — I87.2 VENOUS INSUFFICIENCY: ICD-10-CM

## 2020-02-07 DIAGNOSIS — M79.89 LEG SWELLING: Primary | ICD-10-CM

## 2020-02-07 PROCEDURE — 99152 MOD SED SAME PHYS/QHP 5/>YRS: CPT

## 2020-02-07 PROCEDURE — 3E033TZ INTRODUCTION OF DESTRUCTIVE AGENT INTO PERIPHERAL VEIN, PERCUTANEOUS APPROACH: ICD-10-PCS | Performed by: RADIOLOGY

## 2020-02-07 PROCEDURE — 37799 UNLISTED PX VASCULAR SURGERY: CPT

## 2020-02-07 PROCEDURE — 36482 ENDOVEN THER CHEM ADHES 1ST: CPT

## 2020-02-07 PROCEDURE — 36483 ENDOVEN THER CHEM ADHES SBSQ: CPT

## 2020-02-07 PROCEDURE — 99153 MOD SED SAME PHYS/QHP EA: CPT

## 2020-02-07 PROCEDURE — 36415 COLL VENOUS BLD VENIPUNCTURE: CPT

## 2020-02-07 PROCEDURE — 06DP3ZZ EXTRACTION OF RIGHT SAPHENOUS VEIN, PERCUTANEOUS APPROACH: ICD-10-PCS | Performed by: RADIOLOGY

## 2020-02-07 RX ORDER — SODIUM TETRADECYL SULFATE 30 MG/ML
INJECTION, SOLUTION INTRAVENOUS
Status: COMPLETED
Start: 2020-02-07 | End: 2020-02-07

## 2020-02-07 RX ORDER — LIDOCAINE HYDROCHLORIDE 20 MG/ML
INJECTION, SOLUTION EPIDURAL; INFILTRATION; INTRACAUDAL; PERINEURAL
Status: COMPLETED
Start: 2020-02-07 | End: 2020-02-07

## 2020-02-07 RX ORDER — SODIUM CHLORIDE 9 MG/ML
INJECTION, SOLUTION INTRAVENOUS CONTINUOUS
Status: DISCONTINUED | OUTPATIENT
Start: 2020-02-07 | End: 2020-02-07

## 2020-02-07 RX ORDER — MIDAZOLAM HYDROCHLORIDE 1 MG/ML
INJECTION INTRAMUSCULAR; INTRAVENOUS
Status: COMPLETED
Start: 2020-02-07 | End: 2020-02-07

## 2020-02-07 NOTE — PROGRESS NOTES
Patient awake and alert x 3, instructions given to patient and family, they stated understanding, patient able to eat, drinkambulate and void prior to discharge. Compression dressing to right lower leg along with ace wrap to thigh. Follow up appointment mad

## 2020-02-07 NOTE — PROCEDURES
[facility] HOSP - [place]  Procedure Note    Ekaterina Born Patient Status:  Outpatient in a Bed    1955 MRN [id number]   Location [facility] Attending Katerina Herrera MD   Hosp Day # 0 PCP Hao Shabazz MD

## 2020-02-07 NOTE — PRE-SEDATION ASSESSMENT
[facility]  IR Pre-Procedure Sedation Assessment    History of snoring or sleep or apnea?    Yes    History of previous problems with anesthesia or sedation  No    Physical Findings:  Neck: nl ROM  CV: RRR  PULM: normal respiratory rate/effo

## 2020-02-07 NOTE — H&P
[facility] Patient Status:  Outpatient in a Bed    1955 MRN [id number]   Location [facility] Attending Michelle Calhoun MD   Hosp Day # 0 PCP Yolanda Solitario, [facility] Relation Age of Onset   • Pulmonary Disease Father         COPD   • Hypertension Father    • Other (Other) Father    • Skin cancer Sister          of metastatic vulvar cancer   • Hypertension Sister    • Heart Attack Brother         MI   • Heart Surger MD  2/7/2020  11:40 AM

## 2020-02-10 ENCOUNTER — APPOINTMENT (OUTPATIENT)
Dept: CARDIOLOGY | Age: 65
End: 2020-02-10

## 2020-02-13 ENCOUNTER — OFFICE VISIT (OUTPATIENT)
Dept: INTERNAL MEDICINE CLINIC | Facility: CLINIC | Age: 65
End: 2020-02-13
Payer: MEDICARE

## 2020-02-13 VITALS
TEMPERATURE: 98 F | OXYGEN SATURATION: 99 % | RESPIRATION RATE: 18 BRPM | DIASTOLIC BLOOD PRESSURE: 62 MMHG | WEIGHT: 250 LBS | HEIGHT: 62 IN | HEART RATE: 96 BPM | SYSTOLIC BLOOD PRESSURE: 112 MMHG | BODY MASS INDEX: 46.01 KG/M2

## 2020-02-13 DIAGNOSIS — I10 ESSENTIAL HYPERTENSION: Primary | ICD-10-CM

## 2020-02-13 DIAGNOSIS — I48.91 ATRIAL FIBRILLATION, UNSPECIFIED TYPE (HCC): ICD-10-CM

## 2020-02-13 DIAGNOSIS — I87.2 VENOUS INSUFFICIENCY: ICD-10-CM

## 2020-02-13 DIAGNOSIS — Z91.89 PNEUMOCOCCAL VACCINATION INDICATED: ICD-10-CM

## 2020-02-13 DIAGNOSIS — I83.90 SUPERFICIAL VARICOSITIES, UNSPECIFIED LATERALITY: ICD-10-CM

## 2020-02-13 DIAGNOSIS — R77.1 ELEVATED SERUM GLOBULIN LEVEL: ICD-10-CM

## 2020-02-13 DIAGNOSIS — Z23 NEED FOR SHINGLES VACCINE: ICD-10-CM

## 2020-02-13 DIAGNOSIS — E55.9 VITAMIN D DEFICIENCY: ICD-10-CM

## 2020-02-13 DIAGNOSIS — Z23 FLU VACCINE NEED: ICD-10-CM

## 2020-02-13 PROCEDURE — 99214 OFFICE O/P EST MOD 30 MIN: CPT | Performed by: INTERNAL MEDICINE

## 2020-02-13 PROCEDURE — G0463 HOSPITAL OUTPT CLINIC VISIT: HCPCS | Performed by: INTERNAL MEDICINE

## 2020-02-13 NOTE — PROGRESS NOTES
HPI:   Kofi Melgar is a 71year old female presents with the following problems. Patient doing fairly well. She did indicate that on January 10 she had some left leg work with Dr. Chery Salazar for her veins.   Also on February 7 she had some work wi the lungs every 4 (four) hours as needed for Wheezing. 1 Inhaler 3   • Triamterene-HCTZ 37.5-25 MG Oral Cap Take 1 capsule by mouth daily. 30 capsule 11   • acetaminophen 500 MG Oral Tab Take 500 mg by mouth every 6 (six) hours as needed for Pain.      • Vi Hypertension Father    • Other (Other) Father    • Skin cancer Sister          of metastatic vulvar cancer   • Hypertension Sister    • Heart Attack Brother         MI   • Heart Surgery Brother         CABG   • Cataracts Sister      Results for orders dizziness        EXAM:   /62   Pulse 118   Temp 97.6 °F (36.4 °C) (Oral)   Resp 18   Ht 5' 2\" (1.575 m)   Wt 250 lb (113.4 kg)   SpO2 99%   BMI 45.73 kg/m²     GENERAL:  well developed, well nourished. in no apparent distress  SKIN:  no rashes.   no

## 2020-02-14 ENCOUNTER — HOSPITAL ENCOUNTER (OUTPATIENT)
Dept: ULTRASOUND IMAGING | Facility: HOSPITAL | Age: 65
Discharge: HOME | End: 2020-02-14
Attending: RADIOLOGY
Payer: MEDICARE

## 2020-02-14 DIAGNOSIS — M79.89 LEG SWELLING: ICD-10-CM

## 2020-02-14 PROCEDURE — 93971 EXTREMITY STUDY: CPT | Performed by: RADIOLOGY

## 2020-02-18 RX ORDER — DILTIAZEM HYDROCHLORIDE 120 MG/1
CAPSULE, EXTENDED RELEASE ORAL
Qty: 30 CAPSULE | Refills: 0 | Status: SHIPPED | OUTPATIENT
Start: 2020-02-18 | End: 2020-03-20

## 2020-02-24 ENCOUNTER — APPOINTMENT (OUTPATIENT)
Dept: CARDIOLOGY | Age: 65
End: 2020-02-24

## 2020-03-19 RX ORDER — TRIAMTERENE AND HYDROCHLOROTHIAZIDE 37.5; 25 MG/1; MG/1
CAPSULE ORAL
Qty: 90 CAPSULE | Refills: 1 | Status: SHIPPED | OUTPATIENT
Start: 2020-03-19 | End: 2020-08-13

## 2020-03-19 RX ORDER — APIXABAN 5 MG/1
TABLET, FILM COATED ORAL
Qty: 60 TABLET | Refills: 0 | Status: SHIPPED | OUTPATIENT
Start: 2020-03-19 | End: 2020-04-22

## 2020-03-20 RX ORDER — DILTIAZEM HYDROCHLORIDE 120 MG/1
CAPSULE, EXTENDED RELEASE ORAL
Qty: 30 CAPSULE | Refills: 0 | Status: SHIPPED | OUTPATIENT
Start: 2020-03-20 | End: 2020-04-27

## 2020-04-13 ENCOUNTER — APPOINTMENT (OUTPATIENT)
Dept: CARDIOLOGY | Age: 65
End: 2020-04-13

## 2020-04-22 RX ORDER — APIXABAN 5 MG/1
TABLET, FILM COATED ORAL
Qty: 60 TABLET | Refills: 1 | Status: SHIPPED | OUTPATIENT
Start: 2020-04-22 | End: 2020-05-27 | Stop reason: SDUPTHER

## 2020-04-27 RX ORDER — DILTIAZEM HYDROCHLORIDE 120 MG/1
CAPSULE, EXTENDED RELEASE ORAL
Qty: 30 CAPSULE | Refills: 0 | Status: SHIPPED | OUTPATIENT
Start: 2020-04-27 | End: 2020-05-20

## 2020-05-20 RX ORDER — DILTIAZEM HYDROCHLORIDE 120 MG/1
CAPSULE, EXTENDED RELEASE ORAL
Qty: 30 CAPSULE | Refills: 0 | Status: SHIPPED | OUTPATIENT
Start: 2020-05-20 | End: 2020-05-27 | Stop reason: SDUPTHER

## 2020-05-21 PROBLEM — R31.29 MICROSCOPIC HEMATURIA: Status: ACTIVE | Noted: 2020-05-21

## 2020-05-21 PROBLEM — I87.2 VENOUS INSUFFICIENCY: Status: ACTIVE | Noted: 2020-05-21

## 2020-05-21 PROBLEM — E55.9 VITAMIN D DEFICIENCY: Status: ACTIVE | Noted: 2020-05-21

## 2020-05-21 PROBLEM — K56.609 SMALL BOWEL OBSTRUCTION (CMD): Status: ACTIVE | Noted: 2020-05-21

## 2020-05-21 PROBLEM — R03.0 ELEVATED BLOOD PRESSURE READING: Status: ACTIVE | Noted: 2020-05-21

## 2020-05-21 PROBLEM — I48.91 FIBRILLATION, ATRIAL (CMD): Status: ACTIVE | Noted: 2020-05-21

## 2020-05-21 RX ORDER — MULTIVIT WITH MINERALS/LUTEIN
500 TABLET ORAL EVERY OTHER DAY
COMMUNITY

## 2020-05-21 RX ORDER — ASCORBIC ACID 500 MG
1 TABLET ORAL EVERY OTHER DAY
COMMUNITY
Start: 2012-02-14 | End: 2020-05-27 | Stop reason: ALTCHOICE

## 2020-05-21 RX ORDER — LORATADINE 10 MG/1
10 TABLET ORAL PRN
COMMUNITY

## 2020-05-21 RX ORDER — LANOLIN ALCOHOL/MO/W.PET/CERES
1000 CREAM (GRAM) TOPICAL DAILY
COMMUNITY
End: 2020-05-27

## 2020-05-21 RX ORDER — ALBUTEROL SULFATE 90 UG/1
2 AEROSOL, METERED RESPIRATORY (INHALATION) EVERY 4 HOURS PRN
COMMUNITY
Start: 2019-02-07

## 2020-05-27 ENCOUNTER — OFFICE VISIT (OUTPATIENT)
Dept: CARDIOLOGY | Age: 65
End: 2020-05-27

## 2020-05-27 VITALS
BODY MASS INDEX: 46.07 KG/M2 | HEIGHT: 61 IN | DIASTOLIC BLOOD PRESSURE: 89 MMHG | WEIGHT: 244 LBS | SYSTOLIC BLOOD PRESSURE: 110 MMHG | HEART RATE: 76 BPM

## 2020-05-27 DIAGNOSIS — R03.0 ELEVATED BLOOD PRESSURE READING: Primary | ICD-10-CM

## 2020-05-27 DIAGNOSIS — I48.91 ATRIAL FIBRILLATION, UNSPECIFIED TYPE (CMD): ICD-10-CM

## 2020-05-27 PROCEDURE — 99442 TELEPHONE E&M BY PHYSICIAN EST PT NOT ORIG PREV 7 DAYS 11-20 MIN: CPT | Performed by: INTERNAL MEDICINE

## 2020-05-27 RX ORDER — DILTIAZEM HYDROCHLORIDE 120 MG/1
120 CAPSULE, COATED, EXTENDED RELEASE ORAL DAILY
Qty: 90 CAPSULE | Refills: 0 | Status: SHIPPED | OUTPATIENT
Start: 2020-05-27 | End: 2020-09-11

## 2020-05-27 SDOH — HEALTH STABILITY: MENTAL HEALTH: HOW MANY STANDARD DRINKS CONTAINING ALCOHOL DO YOU HAVE ON A TYPICAL DAY?: 1 OR 2

## 2020-05-27 SDOH — HEALTH STABILITY: MENTAL HEALTH: HOW OFTEN DO YOU HAVE A DRINK CONTAINING ALCOHOL?: MONTHLY OR LESS

## 2020-05-27 ASSESSMENT — PATIENT HEALTH QUESTIONNAIRE - PHQ9
2. FEELING DOWN, DEPRESSED OR HOPELESS: NOT AT ALL
CLINICAL INTERPRETATION OF PHQ2 SCORE: NO FURTHER SCREENING NEEDED
CLINICAL INTERPRETATION OF PHQ9 SCORE: NO FURTHER SCREENING NEEDED
SUM OF ALL RESPONSES TO PHQ9 QUESTIONS 1 AND 2: 0
1. LITTLE INTEREST OR PLEASURE IN DOING THINGS: NOT AT ALL
SUM OF ALL RESPONSES TO PHQ9 QUESTIONS 1 AND 2: 0

## 2020-07-15 ENCOUNTER — HOSPITAL ENCOUNTER (OUTPATIENT)
Dept: ULTRASOUND IMAGING | Facility: HOSPITAL | Age: 65
Discharge: HOME | End: 2020-07-15
Attending: RADIOLOGY
Payer: MEDICARE

## 2020-07-15 DIAGNOSIS — I83.813 VARICOSE VEINS OF LEG WITH PAIN, BILATERAL: ICD-10-CM

## 2020-07-15 PROCEDURE — 93970 EXTREMITY STUDY: CPT | Performed by: RADIOLOGY

## 2020-08-03 ENCOUNTER — TELEPHONE (OUTPATIENT)
Dept: INTERNAL MEDICINE CLINIC | Facility: CLINIC | Age: 65
End: 2020-08-03

## 2020-08-03 DIAGNOSIS — E55.9 VITAMIN D DEFICIENCY: Primary | ICD-10-CM

## 2020-08-03 NOTE — TELEPHONE ENCOUNTER
Darryle Burr is calling to get orders for CMP, Vit B-12, and Thyroid please advise ph.  # [phone]   Routed to clinical

## 2020-08-03 NOTE — TELEPHONE ENCOUNTER
Message noted. We can tell patient that I placed labs February 13 of this year and then added the B12 and TSH today. When she goes to labs she should let them know that there are lab orders from February 13 and with today's date August 3.

## 2020-08-07 ENCOUNTER — LAB ENCOUNTER (OUTPATIENT)
Dept: LAB | Age: 65
End: 2020-08-07
Attending: INTERNAL MEDICINE
Payer: MEDICARE

## 2020-08-07 DIAGNOSIS — R77.1 ELEVATED SERUM GLOBULIN LEVEL: ICD-10-CM

## 2020-08-07 DIAGNOSIS — E55.9 VITAMIN D DEFICIENCY: ICD-10-CM

## 2020-08-07 DIAGNOSIS — I10 ESSENTIAL HYPERTENSION: ICD-10-CM

## 2020-08-07 LAB
ABSOLUTE IMMATURE GRANULOCYTES (OFFPRE24): 0.01
ANION GAP SERPL CALC-SCNC: 6 MMOL/L
ANION GAP SERPL CALC-SCNC: 6 MMOL/L (ref 0–18)
BASOPHILS # BLD AUTO: 0.07 X10(3) UL (ref 0–0.2)
BASOPHILS # BLD: 0.07 10*3/UL
BASOPHILS NFR BLD AUTO: 1.9 %
BASOPHILS NFR BLD: 1.9 %
BUN BLD-MCNC: 10 MG/DL (ref 7–18)
BUN SERPL-MCNC: 10 MG/DL
BUN/CREAT SERPL: 11.5
BUN/CREAT SERPL: 11.5 (ref 10–20)
CALCIUM BLD-MCNC: 9.4 MG/DL (ref 8.5–10.1)
CALCIUM SERPL-MCNC: 9.4 MG/DL
CHLORIDE SERPL-SCNC: 105 MMOL/L
CHLORIDE SERPL-SCNC: 105 MMOL/L (ref 98–112)
CHOLEST SERPL-MCNC: 210 MG/DL
CHOLEST SMN-MCNC: 210 MG/DL (ref ?–200)
CO2 SERPL-SCNC: 28 MMOL/L
CO2 SERPL-SCNC: 28 MMOL/L (ref 21–32)
CREAT BLD-MCNC: 0.87 MG/DL (ref 0.55–1.02)
CREAT SERPL-MCNC: 0.87 MG/DL
DEPRECATED RDW RBC AUTO: 42.8 FL (ref 35.1–46.3)
EOSINOPHIL # BLD AUTO: 0.12 X10(3) UL (ref 0–0.7)
EOSINOPHIL # BLD: 0.12 10*3/UL
EOSINOPHIL NFR BLD AUTO: 3.2 %
EOSINOPHIL NFR BLD: 3.2 %
ERYTHROCYTE [DISTWIDTH] IN BLOOD BY AUTOMATED COUNT: 12.6 % (ref 11–15)
GLUCOSE BLD-MCNC: 89 MG/DL (ref 70–99)
GLUCOSE SERPL-MCNC: 89 MG/DL
HAV IGM SER QL: 2.1 MG/DL (ref 1.6–2.6)
HCT VFR BLD AUTO: 41.9 % (ref 35–48)
HCT VFR BLD CALC: 41.9 %
HDLC SERPL-MCNC: 79 MG/DL
HDLC SERPL-MCNC: 79 MG/DL (ref 40–59)
HGB BLD-MCNC: 14.3 G/DL
HGB BLD-MCNC: 14.3 G/DL (ref 12–16)
IGA SERPL-MCNC: 434 MG/DL (ref 70–312)
IGM SERPL-MCNC: 83.7 MG/DL (ref 43–279)
IMM GRANULOCYTES # BLD AUTO: 0.01 X10(3) UL (ref 0–1)
IMM GRANULOCYTES NFR BLD: 0.3 %
IMMATURE GRANULOCYTES (OFFPRE25): 0.3
IMMUNOGLOBULIN PNL SER-MCNC: 1340 MG/DL (ref 791–1643)
LDLC SERPL CALC-MCNC: 114 MG/DL
LDLC SERPL CALC-MCNC: 114 MG/DL (ref ?–100)
LENGTH OF FAST TIME PATIENT: YES H
LENGTH OF FAST TIME PATIENT: YES H
LYMPHOCYTES # BLD AUTO: 1.09 X10(3) UL (ref 1–4)
LYMPHOCYTES # BLD: 1.09 10*3/UL
LYMPHOCYTES NFR BLD AUTO: 29.3 %
LYMPHOCYTES NFR BLD: 29.3 %
MCH RBC QN AUTO: 31.5 PG
MCH RBC QN AUTO: 31.5 PG (ref 26–34)
MCHC RBC AUTO-ENTMCNC: 34.1 G/DL
MCHC RBC AUTO-ENTMCNC: 34.1 G/DL (ref 31–37)
MCV RBC AUTO: 92.3 FL
MCV RBC AUTO: 92.3 FL (ref 80–100)
MONOCYTES # BLD AUTO: 0.41 X10(3) UL (ref 0.1–1)
MONOCYTES # BLD: 0.41 10*3/UL
MONOCYTES NFR BLD AUTO: 11 %
MONOCYTES NFR BLD: 11 %
NEUTROPHILS # BLD AUTO: 2.02 X10 (3) UL (ref 1.5–7.7)
NEUTROPHILS # BLD AUTO: 2.02 X10(3) UL (ref 1.5–7.7)
NEUTROPHILS # BLD: 2.02 10*3/UL
NEUTROPHILS NFR BLD AUTO: 54.3 %
NEUTROPHILS NFR BLD: 54.3 %
NONHDLC SERPL-MCNC: 131 MG/DL
NONHDLC SERPL-MCNC: 131 MG/DL (ref ?–130)
OSMOLALITY SERPL CALC.SUM OF ELEC: 287 MOSM/KG (ref 275–295)
PATIENT FASTING Y/N/NP: YES
PATIENT FASTING Y/N/NP: YES
PLATELET # BLD AUTO: 208 10(3)UL (ref 150–450)
PLATELET # BLD: 208 K/MCL
POTASSIUM SERPL-SCNC: 3.6 MMOL/L
POTASSIUM SERPL-SCNC: 3.6 MMOL/L (ref 3.5–5.1)
RBC # BLD AUTO: 4.54 X10(6)UL (ref 3.8–5.3)
RBC # BLD: 4.54 10*6/UL
SODIUM SERPL-SCNC: 139 MMOL/L
SODIUM SERPL-SCNC: 139 MMOL/L (ref 136–145)
TRIGL SERPL-MCNC: 86 MG/DL
TRIGL SERPL-MCNC: 86 MG/DL (ref 30–149)
TSI SER-ACNC: 2.14 MIU/ML (ref 0.36–3.74)
VIT B12 SERPL-MCNC: 443 PG/ML (ref 193–986)
VLDLC SERPL CALC-MCNC: 17 MG/DL
VLDLC SERPL CALC-MCNC: 17 MG/DL (ref 0–30)
WBC # BLD AUTO: 3.7 X10(3) UL (ref 4–11)
WBC # BLD: 3.7 K/MCL

## 2020-08-07 PROCEDURE — 82784 ASSAY IGA/IGD/IGG/IGM EACH: CPT

## 2020-08-07 PROCEDURE — 83735 ASSAY OF MAGNESIUM: CPT

## 2020-08-07 PROCEDURE — 82607 VITAMIN B-12: CPT

## 2020-08-07 PROCEDURE — 86334 IMMUNOFIX E-PHORESIS SERUM: CPT

## 2020-08-07 PROCEDURE — 80061 LIPID PANEL: CPT

## 2020-08-07 PROCEDURE — 82306 VITAMIN D 25 HYDROXY: CPT

## 2020-08-07 PROCEDURE — 36415 COLL VENOUS BLD VENIPUNCTURE: CPT

## 2020-08-07 PROCEDURE — 84443 ASSAY THYROID STIM HORMONE: CPT

## 2020-08-07 PROCEDURE — 85025 COMPLETE CBC W/AUTO DIFF WBC: CPT

## 2020-08-07 PROCEDURE — 80048 BASIC METABOLIC PNL TOTAL CA: CPT

## 2020-08-09 LAB — 25(OH)D3 SERPL-MCNC: 24.1 NG/ML (ref 30–100)

## 2020-08-13 ENCOUNTER — OFFICE VISIT (OUTPATIENT)
Dept: INTERNAL MEDICINE CLINIC | Facility: CLINIC | Age: 65
End: 2020-08-13
Payer: MEDICARE

## 2020-08-13 ENCOUNTER — TELEPHONE (OUTPATIENT)
Dept: INTERNAL MEDICINE CLINIC | Facility: CLINIC | Age: 65
End: 2020-08-13

## 2020-08-13 VITALS
HEIGHT: 61 IN | SYSTOLIC BLOOD PRESSURE: 128 MMHG | BODY MASS INDEX: 47.01 KG/M2 | OXYGEN SATURATION: 99 % | WEIGHT: 249 LBS | TEMPERATURE: 98 F | HEART RATE: 63 BPM | DIASTOLIC BLOOD PRESSURE: 82 MMHG

## 2020-08-13 DIAGNOSIS — I48.91 ATRIAL FIBRILLATION, UNSPECIFIED TYPE (HCC): ICD-10-CM

## 2020-08-13 DIAGNOSIS — I87.2 VENOUS INSUFFICIENCY: ICD-10-CM

## 2020-08-13 DIAGNOSIS — R77.1 ELEVATED SERUM GLOBULIN LEVEL: ICD-10-CM

## 2020-08-13 DIAGNOSIS — Z23 FLU VACCINE NEED: ICD-10-CM

## 2020-08-13 DIAGNOSIS — Z23 NEED FOR SHINGLES VACCINE: ICD-10-CM

## 2020-08-13 DIAGNOSIS — I83.90 SUPERFICIAL VARICOSITIES, UNSPECIFIED LATERALITY: ICD-10-CM

## 2020-08-13 DIAGNOSIS — I10 ESSENTIAL HYPERTENSION: Primary | ICD-10-CM

## 2020-08-13 DIAGNOSIS — Z91.89 PNEUMOCOCCAL VACCINATION INDICATED: ICD-10-CM

## 2020-08-13 DIAGNOSIS — E55.9 VITAMIN D DEFICIENCY: ICD-10-CM

## 2020-08-13 PROCEDURE — G0463 HOSPITAL OUTPT CLINIC VISIT: HCPCS | Performed by: INTERNAL MEDICINE

## 2020-08-13 PROCEDURE — 99214 OFFICE O/P EST MOD 30 MIN: CPT | Performed by: INTERNAL MEDICINE

## 2020-08-13 RX ORDER — TRIAMTERENE AND HYDROCHLOROTHIAZIDE 37.5; 25 MG/1; MG/1
1 CAPSULE ORAL DAILY
Qty: 90 CAPSULE | Refills: 3 | Status: SHIPPED | OUTPATIENT
Start: 2020-08-13 | End: 2021-08-31

## 2020-08-13 RX ORDER — ALBUTEROL SULFATE 90 UG/1
2 AEROSOL, METERED RESPIRATORY (INHALATION) EVERY 4 HOURS PRN
Qty: 2 INHALER | Refills: 11 | Status: SHIPPED | OUTPATIENT
Start: 2020-08-13

## 2020-08-13 RX ORDER — AZELAIC ACID 0.15 G/G
GEL TOPICAL
COMMUNITY
End: 2020-08-13 | Stop reason: ALTCHOICE

## 2020-08-13 NOTE — PROGRESS NOTES
HPI:   Dania Duke is a 76year old female presents with the following problems. Patient has no acute problems today. She did wake up with some right scapular discomfort that seems to be more muscular. She has belching.   This does not sound c • CARTIA  MG Oral Capsule SR 24 Hr Take 120 mg by mouth daily. • acetaminophen 500 MG Oral Tab Take 500 mg by mouth every 6 (six) hours as needed for Pain. • Vitamin B-12 1000 MCG Oral Tab Take 1,000 mcg by mouth daily.      • ELIQUIS 5 MG O of metastatic vulvar cancer   • Hypertension Sister    • Heart Attack Brother         MI   • Heart Surgery Brother         CABG   • Cataracts Sister      Results for orders placed or performed in visit on 90/68/15   BASIC METABOLIC PANEL (8)   Result Value 35.0 - 48.0 %    MCV 92.3 80.0 - 100.0 fL    MCH 31.5 26.0 - 34.0 pg    MCHC 34.1 31.0 - 37.0 g/dL    RDW-SD 42.8 35.1 - 46.3 fL    RDW 12.6 11.0 - 15.0 %    .0 150.0 - 450.0 10(3)uL    Neutrophil Absolute Prelim 2.02 1.50 - 7.70 x10 (3) uL    Neutr BMI 47.05 kg/m²     GENERAL:  well developed, well nourished. in no apparent distress  SKIN:  no rashes. no suspicious lesions in areas examined. HEENT:  Atraumatic.   pharynx without exudate or erythema  EYES:  PERRL . conjunctiva clear.   NECK:  supp indicated  She wishes not to get Pneumovax today    7. Need for shingles vaccine  She wishes not to get shingles vaccine    8. Flu vaccine need  She will consider flu vaccine in September 9.  Superficial varicosities, unspecified laterality  Discussed an

## 2020-09-11 RX ORDER — APIXABAN 5 MG/1
TABLET, FILM COATED ORAL
Qty: 180 TABLET | Refills: 1 | Status: SHIPPED | OUTPATIENT
Start: 2020-09-11 | End: 2020-11-30 | Stop reason: SDUPTHER

## 2020-09-11 RX ORDER — DILTIAZEM HYDROCHLORIDE 120 MG/1
CAPSULE, EXTENDED RELEASE ORAL
Qty: 90 CAPSULE | Refills: 1 | Status: SHIPPED | OUTPATIENT
Start: 2020-09-11 | End: 2020-11-30 | Stop reason: SDUPTHER

## 2020-11-27 ENCOUNTER — APPOINTMENT (OUTPATIENT)
Dept: CARDIOLOGY | Age: 65
End: 2020-11-27

## 2020-11-30 ENCOUNTER — OFFICE VISIT (OUTPATIENT)
Dept: CARDIOLOGY | Age: 65
End: 2020-11-30

## 2020-11-30 VITALS
HEART RATE: 87 BPM | SYSTOLIC BLOOD PRESSURE: 134 MMHG | WEIGHT: 253 LBS | DIASTOLIC BLOOD PRESSURE: 68 MMHG | HEIGHT: 61 IN | OXYGEN SATURATION: 99 % | BODY MASS INDEX: 47.77 KG/M2

## 2020-11-30 DIAGNOSIS — R03.0 ELEVATED BLOOD PRESSURE READING: Primary | ICD-10-CM

## 2020-11-30 DIAGNOSIS — I48.91 ATRIAL FIBRILLATION, UNSPECIFIED TYPE (CMD): ICD-10-CM

## 2020-11-30 DIAGNOSIS — I87.2 VENOUS INSUFFICIENCY: ICD-10-CM

## 2020-11-30 PROCEDURE — 99214 OFFICE O/P EST MOD 30 MIN: CPT | Performed by: INTERNAL MEDICINE

## 2020-11-30 RX ORDER — OMEGA-3 FATTY ACIDS/FISH OIL 300-1000MG
200 CAPSULE ORAL PRN
COMMUNITY

## 2020-11-30 RX ORDER — DILTIAZEM HYDROCHLORIDE 120 MG/1
120 CAPSULE, COATED, EXTENDED RELEASE ORAL DAILY
Qty: 90 CAPSULE | Refills: 1 | Status: SHIPPED | OUTPATIENT
Start: 2020-11-30 | End: 2021-05-26 | Stop reason: SDUPTHER

## 2020-11-30 ASSESSMENT — PATIENT HEALTH QUESTIONNAIRE - PHQ9
SUM OF ALL RESPONSES TO PHQ9 QUESTIONS 1 AND 2: 0
SUM OF ALL RESPONSES TO PHQ9 QUESTIONS 1 AND 2: 0
CLINICAL INTERPRETATION OF PHQ9 SCORE: NO FURTHER SCREENING NEEDED
SUM OF ALL RESPONSES TO PHQ9 QUESTIONS 1 AND 2: 0
CLINICAL INTERPRETATION OF PHQ2 SCORE: NO FURTHER SCREENING NEEDED
CLINICAL INTERPRETATION OF PHQ2 SCORE: NO FURTHER SCREENING NEEDED
2. FEELING DOWN, DEPRESSED OR HOPELESS: NOT AT ALL
1. LITTLE INTEREST OR PLEASURE IN DOING THINGS: NOT AT ALL
1. LITTLE INTEREST OR PLEASURE IN DOING THINGS: NOT AT ALL
2. FEELING DOWN, DEPRESSED OR HOPELESS: NOT AT ALL

## 2020-12-01 ENCOUNTER — TELEPHONE (OUTPATIENT)
Dept: CARDIOLOGY | Age: 65
End: 2020-12-01

## 2021-01-19 ENCOUNTER — OFFICE VISIT (OUTPATIENT)
Dept: INTERNAL MEDICINE CLINIC | Facility: CLINIC | Age: 66
End: 2021-01-19
Payer: MEDICARE

## 2021-01-19 VITALS
WEIGHT: 253 LBS | BODY MASS INDEX: 48 KG/M2 | SYSTOLIC BLOOD PRESSURE: 128 MMHG | DIASTOLIC BLOOD PRESSURE: 70 MMHG | TEMPERATURE: 98 F | OXYGEN SATURATION: 98 % | RESPIRATION RATE: 18 BRPM | HEART RATE: 74 BPM

## 2021-01-19 DIAGNOSIS — E55.9 VITAMIN D DEFICIENCY: ICD-10-CM

## 2021-01-19 DIAGNOSIS — I10 ESSENTIAL HYPERTENSION: Primary | ICD-10-CM

## 2021-01-19 DIAGNOSIS — R92.8 ABNORMAL MAMMOGRAM: ICD-10-CM

## 2021-01-19 DIAGNOSIS — I48.91 ATRIAL FIBRILLATION, UNSPECIFIED TYPE (HCC): ICD-10-CM

## 2021-01-19 DIAGNOSIS — Z00.00 ROUTINE HEALTH MAINTENANCE: ICD-10-CM

## 2021-01-19 DIAGNOSIS — I87.2 VENOUS INSUFFICIENCY: ICD-10-CM

## 2021-01-19 PROCEDURE — 99214 OFFICE O/P EST MOD 30 MIN: CPT | Performed by: INTERNAL MEDICINE

## 2021-01-19 RX ORDER — OMEGA-3 FATTY ACIDS/FISH OIL 300-1000MG
CAPSULE ORAL
COMMUNITY

## 2021-01-19 NOTE — PROGRESS NOTES
HPI:   Adia Marshall is a 76year old female presents with the following problems. Pal Arias comes in for her checkup. She feels well. She has hypertension. Blood pressure under good control. She has chronic atrial fibrillation.   On Eliquis Oral Tab Take 1 tablet by mouth 2 (two) times daily. 0   • Vitamin C (VITAMIN C) 500 MG Oral Tab Take 1 tablet by mouth every other day. • Calcium Carb-Cholecalciferol (CALTRATE 600+D) 600-800 MG-UNIT Oral Tab Take 1 tablet by mouth every other day. Potassium 3.6 3.5 - 5.1 mmol/L    Chloride 105 98 - 112 mmol/L    CO2 28.0 21.0 - 32.0 mmol/L    Anion Gap 6 0 - 18 mmol/L    BUN 10 7 - 18 mg/dL    Creatinine 0.87 0.55 - 1.02 mg/dL    BUN/CREA Ratio 11.5 10.0 - 20.0    Calcium, Total 9.4 8.5 - 10.1 mg/dL 4.00 x10(3) uL    Monocyte Absolute 0.41 0.10 - 1.00 x10(3) uL    Eosinophil Absolute 0.12 0.00 - 0.70 x10(3) uL    Basophil Absolute 0.07 0.00 - 0.20 x10(3) uL    Immature Granulocyte Absolute 0.01 0.00 - 1.00 x10(3) uL    Neutrophil % 54.3 %    Lymphocyt no cyanosis, clubbing or edema. NEURO:  Awake and aware. ASSESSMENT AND PLAN:         1. Essential hypertension  Blood pressure under satisfactory control. 2. Atrial fibrillation, unspecified type ([address]  On Eliquis. Rate controlled.   Up-to-date w

## 2021-02-05 ENCOUNTER — IMMUNIZATION (OUTPATIENT)
Dept: LAB | Age: 66
End: 2021-02-05

## 2021-02-05 DIAGNOSIS — Z23 NEED FOR VACCINATION: Primary | ICD-10-CM

## 2021-02-05 PROCEDURE — 91300 COVID 19 PFIZER-BIONTECH: CPT

## 2021-02-05 PROCEDURE — 0001A COVID 19 PFIZER-BIONTECH: CPT

## 2021-02-26 ENCOUNTER — IMMUNIZATION (OUTPATIENT)
Dept: LAB | Age: 66
End: 2021-02-26

## 2021-02-26 DIAGNOSIS — Z23 NEED FOR VACCINATION: Primary | ICD-10-CM

## 2021-02-26 PROCEDURE — 91300 COVID 19 PFIZER-BIONTECH: CPT

## 2021-02-26 PROCEDURE — 0002A COVID 19 PFIZER-BIONTECH: CPT

## 2021-03-08 DIAGNOSIS — Z23 NEED FOR VACCINATION: ICD-10-CM

## 2021-05-25 RX ORDER — LANOLIN ALCOHOL/MO/W.PET/CERES
1000 CREAM (GRAM) TOPICAL EVERY OTHER DAY
COMMUNITY

## 2021-05-26 ENCOUNTER — OFFICE VISIT (OUTPATIENT)
Dept: CARDIOLOGY | Age: 66
End: 2021-05-26

## 2021-05-26 VITALS
OXYGEN SATURATION: 94 % | HEART RATE: 78 BPM | BODY MASS INDEX: 48.15 KG/M2 | HEIGHT: 61 IN | SYSTOLIC BLOOD PRESSURE: 130 MMHG | WEIGHT: 255 LBS | DIASTOLIC BLOOD PRESSURE: 90 MMHG

## 2021-05-26 DIAGNOSIS — I48.91 ATRIAL FIBRILLATION, UNSPECIFIED TYPE (CMD): Primary | ICD-10-CM

## 2021-05-26 DIAGNOSIS — I10 ESSENTIAL HYPERTENSION: ICD-10-CM

## 2021-05-26 DIAGNOSIS — I87.2 VENOUS INSUFFICIENCY: ICD-10-CM

## 2021-05-26 PROCEDURE — 99213 OFFICE O/P EST LOW 20 MIN: CPT | Performed by: INTERNAL MEDICINE

## 2021-05-26 RX ORDER — DILTIAZEM HYDROCHLORIDE 120 MG/1
120 CAPSULE, COATED, EXTENDED RELEASE ORAL DAILY
Qty: 90 CAPSULE | Refills: 3 | Status: SHIPPED | OUTPATIENT
Start: 2021-05-26

## 2021-05-26 ASSESSMENT — PATIENT HEALTH QUESTIONNAIRE - PHQ9
SUM OF ALL RESPONSES TO PHQ9 QUESTIONS 1 AND 2: 0
CLINICAL INTERPRETATION OF PHQ9 SCORE: NO FURTHER SCREENING NEEDED
CLINICAL INTERPRETATION OF PHQ2 SCORE: NO FURTHER SCREENING NEEDED
SUM OF ALL RESPONSES TO PHQ9 QUESTIONS 1 AND 2: 0
2. FEELING DOWN, DEPRESSED OR HOPELESS: NOT AT ALL
1. LITTLE INTEREST OR PLEASURE IN DOING THINGS: NOT AT ALL

## 2021-06-03 RX ORDER — DILTIAZEM HYDROCHLORIDE 120 MG/1
CAPSULE, EXTENDED RELEASE ORAL
Qty: 90 CAPSULE | Refills: 0 | OUTPATIENT
Start: 2021-06-03

## 2021-08-27 ENCOUNTER — LAB ENCOUNTER (OUTPATIENT)
Dept: LAB | Age: 66
End: 2021-08-27
Attending: INTERNAL MEDICINE
Payer: MEDICARE

## 2021-08-27 DIAGNOSIS — I10 ESSENTIAL HYPERTENSION: ICD-10-CM

## 2021-08-27 DIAGNOSIS — E55.9 VITAMIN D DEFICIENCY: ICD-10-CM

## 2021-08-27 LAB
ALBUMIN SERPL-MCNC: 4 G/DL (ref 3.4–5)
ALBUMIN/GLOB SERPL: 1 {RATIO} (ref 1–2)
ALP LIVER SERPL-CCNC: 63 U/L
ALT SERPL-CCNC: 26 U/L
ANION GAP SERPL CALC-SCNC: 7 MMOL/L (ref 0–18)
AST SERPL-CCNC: 25 U/L (ref 15–37)
BASOPHILS # BLD AUTO: 0.08 X10(3) UL (ref 0–0.2)
BASOPHILS NFR BLD AUTO: 1.7 %
BILIRUB SERPL-MCNC: 1.2 MG/DL (ref 0.1–2)
BUN BLD-MCNC: 11 MG/DL (ref 7–18)
BUN/CREAT SERPL: 12.2 (ref 10–20)
CALCIUM BLD-MCNC: 9.4 MG/DL (ref 8.5–10.1)
CHLORIDE SERPL-SCNC: 103 MMOL/L (ref 98–112)
CHOLEST SMN-MCNC: 205 MG/DL (ref ?–200)
CO2 SERPL-SCNC: 29 MMOL/L (ref 21–32)
CREAT BLD-MCNC: 0.9 MG/DL
DEPRECATED RDW RBC AUTO: 42 FL (ref 35.1–46.3)
EOSINOPHIL # BLD AUTO: 0.15 X10(3) UL (ref 0–0.7)
EOSINOPHIL NFR BLD AUTO: 3.2 %
ERYTHROCYTE [DISTWIDTH] IN BLOOD BY AUTOMATED COUNT: 12.2 % (ref 11–15)
GLOBULIN PLAS-MCNC: 4.1 G/DL (ref 2.8–4.4)
GLUCOSE BLD-MCNC: 92 MG/DL (ref 70–99)
HAV IGM SER QL: 2 MG/DL (ref 1.6–2.6)
HCT VFR BLD AUTO: 41.9 %
HDLC SERPL-MCNC: 79 MG/DL (ref 40–59)
HGB BLD-MCNC: 14 G/DL
IMM GRANULOCYTES # BLD AUTO: 0.01 X10(3) UL (ref 0–1)
IMM GRANULOCYTES NFR BLD: 0.2 %
LDLC SERPL CALC-MCNC: 110 MG/DL (ref ?–100)
LYMPHOCYTES # BLD AUTO: 1.15 X10(3) UL (ref 1–4)
LYMPHOCYTES NFR BLD AUTO: 24.9 %
M PROTEIN MFR SERPL ELPH: 8.1 G/DL (ref 6.4–8.2)
MCH RBC QN AUTO: 31.2 PG (ref 26–34)
MCHC RBC AUTO-ENTMCNC: 33.4 G/DL (ref 31–37)
MCV RBC AUTO: 93.3 FL
MONOCYTES # BLD AUTO: 0.47 X10(3) UL (ref 0.1–1)
MONOCYTES NFR BLD AUTO: 10.2 %
NEUTROPHILS # BLD AUTO: 2.76 X10 (3) UL (ref 1.5–7.7)
NEUTROPHILS # BLD AUTO: 2.76 X10(3) UL (ref 1.5–7.7)
NEUTROPHILS NFR BLD AUTO: 59.8 %
NONHDLC SERPL-MCNC: 126 MG/DL (ref ?–130)
OSMOLALITY SERPL CALC.SUM OF ELEC: 287 MOSM/KG (ref 275–295)
PATIENT FASTING Y/N/NP: YES
PATIENT FASTING Y/N/NP: YES
PLATELET # BLD AUTO: 219 10(3)UL (ref 150–450)
POTASSIUM SERPL-SCNC: 3.4 MMOL/L (ref 3.5–5.1)
RBC # BLD AUTO: 4.49 X10(6)UL
SODIUM SERPL-SCNC: 139 MMOL/L (ref 136–145)
TRIGL SERPL-MCNC: 90 MG/DL (ref 30–149)
VIT D+METAB SERPL-MCNC: 19.8 NG/ML (ref 30–100)
VLDLC SERPL CALC-MCNC: 15 MG/DL (ref 0–30)
WBC # BLD AUTO: 4.6 X10(3) UL (ref 4–11)

## 2021-08-27 PROCEDURE — 83735 ASSAY OF MAGNESIUM: CPT

## 2021-08-27 PROCEDURE — 85025 COMPLETE CBC W/AUTO DIFF WBC: CPT

## 2021-08-27 PROCEDURE — 80061 LIPID PANEL: CPT

## 2021-08-27 PROCEDURE — 36415 COLL VENOUS BLD VENIPUNCTURE: CPT

## 2021-08-27 PROCEDURE — 80053 COMPREHEN METABOLIC PANEL: CPT

## 2021-08-27 PROCEDURE — 82306 VITAMIN D 25 HYDROXY: CPT

## 2021-08-31 ENCOUNTER — OFFICE VISIT (OUTPATIENT)
Dept: INTERNAL MEDICINE CLINIC | Facility: CLINIC | Age: 66
End: 2021-08-31
Payer: MEDICARE

## 2021-08-31 VITALS
HEIGHT: 61 IN | SYSTOLIC BLOOD PRESSURE: 142 MMHG | OXYGEN SATURATION: 98 % | DIASTOLIC BLOOD PRESSURE: 80 MMHG | HEART RATE: 94 BPM | WEIGHT: 254.38 LBS | TEMPERATURE: 98 F | BODY MASS INDEX: 48.02 KG/M2

## 2021-08-31 DIAGNOSIS — I48.91 ATRIAL FIBRILLATION, UNSPECIFIED TYPE (HCC): ICD-10-CM

## 2021-08-31 DIAGNOSIS — Z00.00 MEDICARE ANNUAL WELLNESS VISIT, INITIAL: Primary | ICD-10-CM

## 2021-08-31 DIAGNOSIS — E87.6 HYPOKALEMIA: ICD-10-CM

## 2021-08-31 DIAGNOSIS — E55.9 VITAMIN D DEFICIENCY: ICD-10-CM

## 2021-08-31 DIAGNOSIS — I10 ESSENTIAL HYPERTENSION: ICD-10-CM

## 2021-08-31 DIAGNOSIS — Z00.00 ROUTINE HEALTH MAINTENANCE: ICD-10-CM

## 2021-08-31 DIAGNOSIS — I87.2 VENOUS INSUFFICIENCY: ICD-10-CM

## 2021-08-31 PROCEDURE — 99214 OFFICE O/P EST MOD 30 MIN: CPT | Performed by: INTERNAL MEDICINE

## 2021-08-31 PROCEDURE — G0438 PPPS, INITIAL VISIT: HCPCS | Performed by: INTERNAL MEDICINE

## 2021-08-31 RX ORDER — DILTIAZEM HYDROCHLORIDE 120 MG/1
120 CAPSULE, EXTENDED RELEASE ORAL DAILY
COMMUNITY
Start: 2021-05-26 | End: 2021-08-31

## 2021-08-31 RX ORDER — TRIAMTERENE AND HYDROCHLOROTHIAZIDE 37.5; 25 MG/1; MG/1
1 CAPSULE ORAL DAILY
Qty: 90 CAPSULE | Refills: 3 | Status: SHIPPED | OUTPATIENT
Start: 2021-08-31

## 2021-08-31 NOTE — PROGRESS NOTES
HPI:   Oh Serrato is a 78year old female presents with the following problems. Kevin comes in for follow-up. She feels well. She has no unusual complaints. She is in the process of moving to a new home. She has hypertension.   Repeat blo capsule 3   • Albuterol Sulfate HFA (PROAIR HFA) 108 (90 Base) MCG/ACT Inhalation Aero Soln Inhale 2 puffs into the lungs every 4 (four) hours as needed for Wheezing. 2 Inhaler 11   • CARTIA  MG Oral Capsule SR 24 Hr Take 120 mg by mouth daily.      • (Other) Father    • Skin cancer Sister          of metastatic vulvar cancer   • Hypertension Sister    • Heart Attack Brother         MI   • Heart Surgery Brother         CABG   • Cataracts Sister      Results for orders placed or performed in visit on 4.00 x10(3) uL    Monocyte Absolute 0.47 0.10 - 1.00 x10(3) uL    Eosinophil Absolute 0.15 0.00 - 0.70 x10(3) uL    Basophil Absolute 0.08 0.00 - 0.20 x10(3) uL    Immature Granulocyte Absolute 0.01 0.00 - 1.00 x10(3) uL    Neutrophil % 59.8 %    Lymphocyt ulceration. She does have thickened nails. Her left great toenail is deformed and at risk of cutting second toe. With this in mind I have asked her to see Dr. Jag Faulkner. NEURO:  Awake and aware.        General Health     In the past six months, have you lo 0-No    Fall/Risk Scorin          Depression Screening (PHQ-2/PHQ-9): Over the LAST 2 WEEKS                      Advance Directives     Do you have a healthcare power of ?: Yes    Do you have a living will?: Yes     Hearing Assessment (Required taking her vitamin D supplement. Repeat vitamin D level 6 months. This visit was 30 minutes. I spent 10 minutes before visit preparing and reviewing old records. Greater than 50% of the visit was engaged in counseling and review of past data.

## 2021-09-01 ENCOUNTER — TELEPHONE (OUTPATIENT)
Dept: INTERNAL MEDICINE CLINIC | Facility: CLINIC | Age: 66
End: 2021-09-01

## 2021-09-01 DIAGNOSIS — E55.9 VITAMIN D DEFICIENCY: Primary | ICD-10-CM

## 2021-09-01 NOTE — TELEPHONE ENCOUNTER
Dr Yessica Kumar entered lab for pt when she was seen this week  Pt would like Vitamin D added to that order  Tasked to nursing

## 2021-09-01 NOTE — TELEPHONE ENCOUNTER
Patient was calling back. No nurse available. Patient was questioned about the message below. Patient was informed she just completed a Vitamin D on 8/27/21 per message below. Patient states she is requesting the Vitamin D to be added to her order to get done in a month.

## 2021-09-14 ENCOUNTER — OFFICE VISIT (OUTPATIENT)
Dept: PODIATRY CLINIC | Facility: CLINIC | Age: 66
End: 2021-09-14
Payer: MEDICARE

## 2021-09-14 DIAGNOSIS — M79.675 PAIN IN TOES OF BOTH FEET: Primary | ICD-10-CM

## 2021-09-14 DIAGNOSIS — B35.1 ONYCHOMYCOSIS: ICD-10-CM

## 2021-09-14 DIAGNOSIS — M79.674 PAIN IN TOES OF BOTH FEET: Primary | ICD-10-CM

## 2021-09-14 PROCEDURE — 11721 DEBRIDE NAIL 6 OR MORE: CPT | Performed by: PODIATRIST

## 2021-09-14 PROCEDURE — 99202 OFFICE O/P NEW SF 15 MIN: CPT | Performed by: PODIATRIST

## 2021-09-14 NOTE — PROGRESS NOTES
HPI:    Patient ID: Barney Chin is a 78year old female. This pleasant 70-year-old female presents as a new patient to me on referral from [facility]. Patient's primary concern is inability to manage and care for her own toenails.   They become gr Comment:Slurred speech, drooling  Doxycycline             SHORTNESS OF BREATH    Comment:Other reaction(s): Trouble Breathing  Doxycycline Hyclate     PAIN, SHORTNESS OF BREATH    Comment:Other reaction(s): vomiting & joint pain  Minocycline             S (primary encounter diagnosis)  Onychomycosis    No orders of the defined types were placed in this encounter.       Meds This Visit:  Requested Prescriptions      No prescriptions requested or ordered in this encounter       Imaging & Referrals:  None

## 2021-09-21 ENCOUNTER — APPOINTMENT (OUTPATIENT)
Dept: CARDIOLOGY | Age: 66
End: 2021-09-21
Attending: INTERNAL MEDICINE

## 2021-09-27 ENCOUNTER — APPOINTMENT (OUTPATIENT)
Dept: CARDIOLOGY | Age: 66
End: 2021-09-27

## 2021-11-09 ENCOUNTER — LAB ENCOUNTER (OUTPATIENT)
Dept: LAB | Age: 66
End: 2021-11-09
Attending: INTERNAL MEDICINE
Payer: MEDICARE

## 2021-11-09 DIAGNOSIS — E55.9 VITAMIN D DEFICIENCY: ICD-10-CM

## 2021-11-09 DIAGNOSIS — E87.6 HYPOKALEMIA: ICD-10-CM

## 2021-11-09 PROCEDURE — 80048 BASIC METABOLIC PNL TOTAL CA: CPT

## 2021-11-09 PROCEDURE — 36415 COLL VENOUS BLD VENIPUNCTURE: CPT

## 2021-11-09 PROCEDURE — 82306 VITAMIN D 25 HYDROXY: CPT

## 2021-11-12 ENCOUNTER — TELEPHONE (OUTPATIENT)
Dept: INTERNAL MEDICINE CLINIC | Facility: CLINIC | Age: 66
End: 2021-11-12

## 2021-11-12 DIAGNOSIS — E87.6 HYPOKALEMIA: Primary | ICD-10-CM

## 2021-11-12 RX ORDER — POTASSIUM CHLORIDE 20 MEQ/1
20 TABLET, EXTENDED RELEASE ORAL DAILY
Qty: 30 TABLET | Refills: 2 | Status: SHIPPED | OUTPATIENT
Start: 2021-11-12 | End: 2021-11-18

## 2021-11-12 NOTE — TELEPHONE ENCOUNTER
To Dr Hilario Nieves to patient and relayed MD message. Pt verbalized understanding with Vit D being increased to 2000 U daily.      States unable to swallow KDUR pills, is there an alternative she can take

## 2021-11-12 NOTE — TELEPHONE ENCOUNTER
Please let patient know the following    1. Her vitamin D level is improved now to 24. Per medication list it looks like she is on 1000 units of vitamin D. It would not be a bad idea to increase that up to 2000 units daily    2.   Her potassium is little

## 2021-11-15 RX ORDER — POTASSIUM CHLORIDE 20 MEQ/1
20 TABLET, EXTENDED RELEASE ORAL 2 TIMES DAILY
Refills: 0 | Status: CANCELLED | OUTPATIENT
Start: 2021-11-15

## 2021-11-16 NOTE — TELEPHONE ENCOUNTER
Patient is calling she picked up the 10mEq, this size is still too big    Dilcia Diaz mentioned she could try 8mEq,   she would like to try this size please send into the pharmacy    Patient can be reached at [phone]

## 2021-11-18 RX ORDER — POTASSIUM CHLORIDE 600 MG/1
16 TABLET, FILM COATED, EXTENDED RELEASE ORAL DAILY
Qty: 60 TABLET | Refills: 11 | Status: SHIPPED | OUTPATIENT
Start: 2021-11-18

## 2021-11-18 NOTE — TELEPHONE ENCOUNTER
New Rx sent    Requested Prescriptions     Signed Prescriptions Disp Refills   • Potassium Chloride ER (KLOR-CON) 8 MEQ Oral Tab CR 60 tablet 11     Sig: Take 2 tablets (16 mEq total) by mouth daily.      Authorizing Provider: Toni Wise     Ordering Use

## 2021-12-14 ENCOUNTER — OFFICE VISIT (OUTPATIENT)
Dept: PODIATRY CLINIC | Facility: CLINIC | Age: 66
End: 2021-12-14
Payer: MEDICARE

## 2021-12-14 DIAGNOSIS — M79.674 PAIN IN TOES OF BOTH FEET: Primary | ICD-10-CM

## 2021-12-14 DIAGNOSIS — M79.675 PAIN IN TOES OF BOTH FEET: Primary | ICD-10-CM

## 2021-12-14 DIAGNOSIS — B35.1 ONYCHOMYCOSIS: ICD-10-CM

## 2021-12-14 PROCEDURE — 11721 DEBRIDE NAIL 6 OR MORE: CPT | Performed by: PODIATRIST

## 2021-12-14 NOTE — PROGRESS NOTES
HPI:    Patient ID: Dania Duke is a 78year old female. 30-year-old female presents to the office today with recurrent symptoms associated with her toenails. The last time I saw this patient she reports relief by previous care.     ROS:     Ther SHORTNESS OF BREATH    Comment:Other reaction(s): Trouble Breathing  Orphenadrine            OTHER (SEE COMMENTS)    Comment:Other reaction(s): shakey  Ra Glucosamine-Chris*    OTHER (SEE COMMENTS)    Comment:bioflex brand-tongue swelling, difficulty

## 2022-01-04 ENCOUNTER — IMMUNIZATION (OUTPATIENT)
Dept: LAB | Facility: HOSPITAL | Age: 67
End: 2022-01-04
Attending: EMERGENCY MEDICINE
Payer: MEDICARE

## 2022-01-04 DIAGNOSIS — Z23 NEED FOR VACCINATION: Primary | ICD-10-CM

## 2022-01-04 PROCEDURE — 0004A SARSCOV2 VAC 30MCG/0.3ML IM: CPT | Performed by: NURSE PRACTITIONER

## 2022-02-24 ENCOUNTER — TELEPHONE (OUTPATIENT)
Dept: INTERNAL MEDICINE CLINIC | Facility: CLINIC | Age: 67
End: 2022-02-24

## 2022-02-24 ENCOUNTER — LAB ENCOUNTER (OUTPATIENT)
Dept: LAB | Age: 67
End: 2022-02-24
Attending: INTERNAL MEDICINE
Payer: MEDICARE

## 2022-02-24 DIAGNOSIS — E87.6 HYPOKALEMIA: ICD-10-CM

## 2022-02-24 LAB
ANION GAP SERPL CALC-SCNC: 7 MMOL/L (ref 0–18)
BUN BLD-MCNC: 14 MG/DL (ref 7–18)
BUN/CREAT SERPL: 14.7 (ref 10–20)
CALCIUM BLD-MCNC: 9.8 MG/DL (ref 8.5–10.1)
CHLORIDE SERPL-SCNC: 103 MMOL/L (ref 98–112)
CO2 SERPL-SCNC: 29 MMOL/L (ref 21–32)
CREAT BLD-MCNC: 0.95 MG/DL
FASTING STATUS PATIENT QL REPORTED: YES
GLUCOSE BLD-MCNC: 102 MG/DL (ref 70–99)
OSMOLALITY SERPL CALC.SUM OF ELEC: 289 MOSM/KG (ref 275–295)
POTASSIUM SERPL-SCNC: 3.8 MMOL/L (ref 3.5–5.1)
SODIUM SERPL-SCNC: 139 MMOL/L (ref 136–145)

## 2022-02-24 PROCEDURE — 80048 BASIC METABOLIC PNL TOTAL CA: CPT

## 2022-02-24 PROCEDURE — 36415 COLL VENOUS BLD VENIPUNCTURE: CPT

## 2022-03-01 ENCOUNTER — OFFICE VISIT (OUTPATIENT)
Dept: INTERNAL MEDICINE CLINIC | Facility: CLINIC | Age: 67
End: 2022-03-01
Payer: MEDICARE

## 2022-03-01 VITALS
HEART RATE: 65 BPM | BODY MASS INDEX: 46.82 KG/M2 | OXYGEN SATURATION: 99 % | HEIGHT: 61 IN | TEMPERATURE: 98 F | DIASTOLIC BLOOD PRESSURE: 84 MMHG | WEIGHT: 248 LBS | SYSTOLIC BLOOD PRESSURE: 134 MMHG

## 2022-03-01 DIAGNOSIS — E55.9 VITAMIN D DEFICIENCY: ICD-10-CM

## 2022-03-01 DIAGNOSIS — I48.91 ATRIAL FIBRILLATION, UNSPECIFIED TYPE (HCC): Primary | ICD-10-CM

## 2022-03-01 DIAGNOSIS — I10 ESSENTIAL HYPERTENSION: ICD-10-CM

## 2022-03-01 DIAGNOSIS — Z00.00 ROUTINE HEALTH MAINTENANCE: ICD-10-CM

## 2022-03-01 DIAGNOSIS — I83.93 VARICOSE VEINS OF BOTH LOWER EXTREMITIES WITHOUT ULCER OR INFLAMMATION: ICD-10-CM

## 2022-03-01 PROCEDURE — 99214 OFFICE O/P EST MOD 30 MIN: CPT | Performed by: INTERNAL MEDICINE

## 2022-03-01 RX ORDER — ALBUTEROL SULFATE 90 UG/1
2 AEROSOL, METERED RESPIRATORY (INHALATION) EVERY 4 HOURS PRN
Refills: 0 | Status: CANCELLED | OUTPATIENT
Start: 2022-03-01

## 2022-04-05 ENCOUNTER — OFFICE VISIT (OUTPATIENT)
Dept: PODIATRY CLINIC | Facility: CLINIC | Age: 67
End: 2022-04-05
Payer: MEDICARE

## 2022-04-05 DIAGNOSIS — B35.1 ONYCHOMYCOSIS: ICD-10-CM

## 2022-04-05 DIAGNOSIS — M79.675 PAIN IN TOES OF BOTH FEET: Primary | ICD-10-CM

## 2022-04-05 DIAGNOSIS — M79.674 PAIN IN TOES OF BOTH FEET: Primary | ICD-10-CM

## 2022-04-05 PROCEDURE — 11721 DEBRIDE NAIL 6 OR MORE: CPT | Performed by: PODIATRIST

## 2022-04-11 ENCOUNTER — OFFICE VISIT (OUTPATIENT)
Dept: INTERNAL MEDICINE CLINIC | Facility: CLINIC | Age: 67
End: 2022-04-11
Payer: MEDICARE

## 2022-04-11 ENCOUNTER — TELEPHONE (OUTPATIENT)
Dept: INTERNAL MEDICINE CLINIC | Facility: CLINIC | Age: 67
End: 2022-04-11

## 2022-04-11 VITALS
DIASTOLIC BLOOD PRESSURE: 80 MMHG | HEIGHT: 60.8 IN | SYSTOLIC BLOOD PRESSURE: 138 MMHG | TEMPERATURE: 98 F | HEART RATE: 83 BPM | BODY MASS INDEX: 47.62 KG/M2 | WEIGHT: 249 LBS | OXYGEN SATURATION: 99 %

## 2022-04-11 DIAGNOSIS — I87.2 ULCER OF EXTREMITY DUE TO CHRONIC VENOUS INSUFFICIENCY (HCC): Primary | ICD-10-CM

## 2022-04-11 DIAGNOSIS — I83.93 VARICOSE VEINS OF BOTH LOWER EXTREMITIES WITHOUT ULCER OR INFLAMMATION: ICD-10-CM

## 2022-04-11 DIAGNOSIS — I48.91 ATRIAL FIBRILLATION, UNSPECIFIED TYPE (HCC): ICD-10-CM

## 2022-04-11 DIAGNOSIS — I10 ESSENTIAL HYPERTENSION: ICD-10-CM

## 2022-04-11 DIAGNOSIS — L98.499 ULCER OF EXTREMITY DUE TO CHRONIC VENOUS INSUFFICIENCY (HCC): Primary | ICD-10-CM

## 2022-04-11 PROCEDURE — 99214 OFFICE O/P EST MOD 30 MIN: CPT | Performed by: INTERNAL MEDICINE

## 2022-04-11 NOTE — TELEPHONE ENCOUNTER
[facility] desk, please change patient's appointment at 3:30 PM.  I spoke to her yesterday and she will come at 3:30 PM instead of 2:30 PM.    Please block my 2:30 PM and 3 PM.    Thank you.

## 2022-04-18 ENCOUNTER — OFFICE VISIT (OUTPATIENT)
Dept: INTERNAL MEDICINE CLINIC | Facility: CLINIC | Age: 67
End: 2022-04-18
Payer: MEDICARE

## 2022-04-18 VITALS
BODY MASS INDEX: 48.61 KG/M2 | TEMPERATURE: 98 F | DIASTOLIC BLOOD PRESSURE: 68 MMHG | HEIGHT: 60 IN | SYSTOLIC BLOOD PRESSURE: 114 MMHG | HEART RATE: 90 BPM | OXYGEN SATURATION: 97 % | RESPIRATION RATE: 16 BRPM | WEIGHT: 247.63 LBS

## 2022-04-18 DIAGNOSIS — I83.93 VARICOSE VEINS OF BOTH LOWER EXTREMITIES WITHOUT ULCER OR INFLAMMATION: ICD-10-CM

## 2022-04-18 DIAGNOSIS — L98.499 ULCER OF EXTREMITY DUE TO CHRONIC VENOUS INSUFFICIENCY (HCC): Primary | ICD-10-CM

## 2022-04-18 DIAGNOSIS — I10 ESSENTIAL HYPERTENSION: ICD-10-CM

## 2022-04-18 DIAGNOSIS — I87.2 ULCER OF EXTREMITY DUE TO CHRONIC VENOUS INSUFFICIENCY (HCC): Primary | ICD-10-CM

## 2022-04-18 DIAGNOSIS — I48.91 ATRIAL FIBRILLATION, UNSPECIFIED TYPE (HCC): ICD-10-CM

## 2022-04-18 PROCEDURE — 99214 OFFICE O/P EST MOD 30 MIN: CPT | Performed by: INTERNAL MEDICINE

## 2022-04-25 ENCOUNTER — OFFICE VISIT (OUTPATIENT)
Dept: INTERNAL MEDICINE CLINIC | Facility: CLINIC | Age: 67
End: 2022-04-25
Payer: MEDICARE

## 2022-04-25 VITALS
HEART RATE: 94 BPM | DIASTOLIC BLOOD PRESSURE: 80 MMHG | BODY MASS INDEX: 48.29 KG/M2 | TEMPERATURE: 98 F | HEIGHT: 60 IN | SYSTOLIC BLOOD PRESSURE: 126 MMHG | WEIGHT: 246 LBS | OXYGEN SATURATION: 98 %

## 2022-04-25 DIAGNOSIS — Z00.00 ROUTINE HEALTH MAINTENANCE: ICD-10-CM

## 2022-04-25 DIAGNOSIS — I10 ESSENTIAL HYPERTENSION: ICD-10-CM

## 2022-04-25 DIAGNOSIS — I83.93 VARICOSE VEINS OF BOTH LOWER EXTREMITIES WITHOUT ULCER OR INFLAMMATION: ICD-10-CM

## 2022-04-25 DIAGNOSIS — I48.91 ATRIAL FIBRILLATION, UNSPECIFIED TYPE (HCC): ICD-10-CM

## 2022-04-25 DIAGNOSIS — L98.499 ULCER OF EXTREMITY DUE TO CHRONIC VENOUS INSUFFICIENCY (HCC): Primary | ICD-10-CM

## 2022-04-25 DIAGNOSIS — I87.2 ULCER OF EXTREMITY DUE TO CHRONIC VENOUS INSUFFICIENCY (HCC): Primary | ICD-10-CM

## 2022-04-27 ENCOUNTER — APPOINTMENT (OUTPATIENT)
Dept: WOUND CARE | Facility: HOSPITAL | Age: 67
End: 2022-04-27
Attending: CLINICAL NURSE SPECIALIST
Payer: MEDICARE

## 2022-05-03 ENCOUNTER — OFFICE VISIT (OUTPATIENT)
Dept: INTERNAL MEDICINE CLINIC | Facility: CLINIC | Age: 67
End: 2022-05-03
Payer: MEDICARE

## 2022-05-03 VITALS
HEIGHT: 60 IN | DIASTOLIC BLOOD PRESSURE: 80 MMHG | OXYGEN SATURATION: 98 % | TEMPERATURE: 98 F | HEART RATE: 89 BPM | SYSTOLIC BLOOD PRESSURE: 156 MMHG | WEIGHT: 244 LBS | BODY MASS INDEX: 47.91 KG/M2

## 2022-05-03 DIAGNOSIS — L98.499 ULCER OF EXTREMITY DUE TO CHRONIC VENOUS INSUFFICIENCY (HCC): Primary | ICD-10-CM

## 2022-05-03 DIAGNOSIS — I10 ESSENTIAL HYPERTENSION: ICD-10-CM

## 2022-05-03 DIAGNOSIS — I87.2 ULCER OF EXTREMITY DUE TO CHRONIC VENOUS INSUFFICIENCY (HCC): Primary | ICD-10-CM

## 2022-05-03 DIAGNOSIS — I48.91 ATRIAL FIBRILLATION, UNSPECIFIED TYPE (HCC): ICD-10-CM

## 2022-05-03 PROCEDURE — 99214 OFFICE O/P EST MOD 30 MIN: CPT | Performed by: INTERNAL MEDICINE

## 2022-06-12 RX ORDER — ALBUTEROL SULFATE 90 UG/1
2 AEROSOL, METERED RESPIRATORY (INHALATION) EVERY 4 HOURS PRN
Qty: 8.5 G | Refills: 11 | Status: SHIPPED | OUTPATIENT
Start: 2022-06-12

## 2022-06-13 ENCOUNTER — TELEPHONE (OUTPATIENT)
Dept: INTERNAL MEDICINE CLINIC | Facility: CLINIC | Age: 67
End: 2022-06-13

## 2022-06-13 DIAGNOSIS — B34.9 VIRAL SYNDROME: Primary | ICD-10-CM

## 2022-06-13 NOTE — TELEPHONE ENCOUNTER
Respiratory infection triage:    Fever:  [x]  No fever  []  Fever>100.4    Cough:  [] Tight cough  [] Cough with exertion  [] Dry cough  [x] Sputum production, Color: yellowish , clear  Breathing:  [] Mild shortness of breath interfering with activity  [x] Wheezing  [] Pain with deep breathing  [x] Using inhaler    Other symptoms:  [] Sore throat  [] Difficulty swallowing  [x] Nasal drainage/congestion  [x] Sinus congestion/pressure  [] Ear pain  [x] Body aches  [x] Poor appetite  [] Loss of sense of smell   [] Loss of sense of taste  []Conjunctivitis? [] Any recent travel? [] Any sick contacts? [] Are you a healthcare worker? ADDITIONAL NOTES:    Please advise - called patient with above SX , started Thursday , also ears are plugged , chills sometimes, Took home covid test Friday and Saturday , both negative - to       Notified patient that we will route this message to the doctor and see what their recommendations would be. In the meantime, if anything worsens, they were advised to call back or seek emergent evaluation.

## 2022-06-13 NOTE — TELEPHONE ENCOUNTER
Symptoms noted. She has multiple allergies to most of the medications we use for URIs. Symptoms may still be COVID even though she tested negative x2 at home. I placed order for PCR COVID test to be more accurate. For now she can use over-the-counter \"cold\" products to relieve any of her symptoms if she wishes.

## 2022-06-13 NOTE — TELEPHONE ENCOUNTER
Spoke to patient and relayed MD message. Pt verbalized understanding and agrees with plan. Taking  at night and throat lozenges. Will schedule appt at  [place] which is closer to her location. in a day or two. Reiterated wearing a mask and distancing while out and about. Reminded to go to ED if her SOB gets worse, develops chest discomfort or fever she is unable to break. Jone Martinez

## 2022-06-13 NOTE — TELEPHONE ENCOUNTER
Patient is calling with cold like symptoms. Patient states she is currently experiencing a cough, coughing up clear/yellow mucus, and runny nose. Denies any fever or shortness of breath. Did have the chills but they have since gone away. Symptoms started on Thursday. Patient has taken some over the counter medications to help, with no relief. Patient has not been around anyone with covid that she knows of, has not traveled, and has taken two at-home coivd tests (one Friday, one Saturday) both have come back negative. Patient is fully vaccinated and one booster.  # [phone]  Patient is hoping to come in to see Dr Rahat Gutierrez.

## 2022-07-01 ENCOUNTER — HOSPITAL ENCOUNTER (OUTPATIENT)
Dept: ULTRASOUND IMAGING | Facility: HOSPITAL | Age: 67
Discharge: HOME | End: 2022-07-01
Attending: RADIOLOGY
Payer: MEDICARE

## 2022-07-01 DIAGNOSIS — I87.2 VENOUS INSUFFICIENCY OF BOTH LOWER EXTREMITIES: ICD-10-CM

## 2022-07-01 DIAGNOSIS — I83.813 VARICOSE VEINS OF LEG WITH PAIN, BILATERAL: ICD-10-CM

## 2022-07-01 PROCEDURE — 93970 EXTREMITY STUDY: CPT | Performed by: RADIOLOGY

## 2022-07-05 ENCOUNTER — OFFICE VISIT (OUTPATIENT)
Dept: PODIATRY CLINIC | Facility: CLINIC | Age: 67
End: 2022-07-05
Payer: MEDICARE

## 2022-07-05 DIAGNOSIS — M79.674 PAIN IN TOES OF BOTH FEET: Primary | ICD-10-CM

## 2022-07-05 DIAGNOSIS — B35.1 ONYCHOMYCOSIS: ICD-10-CM

## 2022-07-05 DIAGNOSIS — M79.675 PAIN IN TOES OF BOTH FEET: Primary | ICD-10-CM

## 2022-07-05 PROCEDURE — 11721 DEBRIDE NAIL 6 OR MORE: CPT | Performed by: PODIATRIST

## 2022-07-26 NOTE — TELEPHONE ENCOUNTER
Please enter lab orders for appt on 7-31  Including Mag  Vit D and B12 Dutasteride Counseling: Dustasteride Counseling:  I discussed with the patient the risks of use of dutasteride including but not limited to decreased libido, decreased ejaculate volume, and gynecomastia. Women who can become pregnant should not handle medication.  All of the patient's questions and concerns were addressed. Dutasteride Male Counseling: Dustasteride Counseling:  I discussed with the patient the risks of use of dutasteride including but not limited to decreased libido, decreased ejaculate volume, and gynecomastia. Women who can become pregnant should not handle medication.  All of the patient's questions and concerns were addressed.

## 2022-08-19 RX ORDER — TRIAMTERENE AND HYDROCHLOROTHIAZIDE 37.5; 25 MG/1; MG/1
CAPSULE ORAL
Qty: 90 CAPSULE | Refills: 3 | Status: SHIPPED | OUTPATIENT
Start: 2022-08-19

## 2022-08-22 ENCOUNTER — LAB ENCOUNTER (OUTPATIENT)
Dept: LAB | Age: 67
End: 2022-08-22
Attending: RADIOLOGY
Payer: MEDICARE

## 2022-08-22 DIAGNOSIS — Z01.818 PRE-OP TESTING: ICD-10-CM

## 2022-08-22 LAB — SARS-COV-2 RNA RESP QL NAA+PROBE: NOT DETECTED

## 2022-08-24 ENCOUNTER — HOSPITAL ENCOUNTER (OUTPATIENT)
Dept: INTERVENTIONAL RADIOLOGY/VASCULAR | Facility: HOSPITAL | Age: 67
Discharge: HOME | End: 2022-08-24
Attending: RADIOLOGY | Admitting: RADIOLOGY
Payer: MEDICARE

## 2022-08-24 VITALS
HEIGHT: 60 IN | BODY MASS INDEX: 47.12 KG/M2 | TEMPERATURE: 99 F | WEIGHT: 240 LBS | OXYGEN SATURATION: 99 % | DIASTOLIC BLOOD PRESSURE: 92 MMHG | SYSTOLIC BLOOD PRESSURE: 129 MMHG | HEART RATE: 75 BPM | RESPIRATION RATE: 18 BRPM

## 2022-08-24 DIAGNOSIS — I87.2 VENOUS INSUFFICIENCY: ICD-10-CM

## 2022-08-24 DIAGNOSIS — Z01.818 PRE-OP TESTING: Primary | ICD-10-CM

## 2022-08-24 PROCEDURE — 36482 ENDOVEN THER CHEM ADHES 1ST: CPT

## 2022-08-24 PROCEDURE — 76942 ECHO GUIDE FOR BIOPSY: CPT

## 2022-08-24 PROCEDURE — 99152 MOD SED SAME PHYS/QHP 5/>YRS: CPT

## 2022-08-24 PROCEDURE — 36470 NJX SCLRSNT 1 INCMPTNT VEIN: CPT

## 2022-08-24 PROCEDURE — 3E033TZ INTRODUCTION OF DESTRUCTIVE AGENT INTO PERIPHERAL VEIN, PERCUTANEOUS APPROACH: ICD-10-PCS | Performed by: RADIOLOGY

## 2022-08-24 PROCEDURE — 36415 COLL VENOUS BLD VENIPUNCTURE: CPT

## 2022-08-24 PROCEDURE — 99153 MOD SED SAME PHYS/QHP EA: CPT

## 2022-08-24 RX ORDER — LIDOCAINE HYDROCHLORIDE 20 MG/ML
INJECTION, SOLUTION EPIDURAL; INFILTRATION; INTRACAUDAL; PERINEURAL
Status: COMPLETED
Start: 2022-08-24 | End: 2022-08-24

## 2022-08-24 RX ORDER — SODIUM CHLORIDE 9 MG/ML
INJECTION, SOLUTION INTRAVENOUS CONTINUOUS
Status: DISCONTINUED | OUTPATIENT
Start: 2022-08-24 | End: 2022-08-24

## 2022-08-24 RX ORDER — SODIUM TETRADECYL SULFATE 30 MG/ML
INJECTION, SOLUTION INTRAVENOUS
Status: COMPLETED
Start: 2022-08-24 | End: 2022-08-24

## 2022-08-24 RX ORDER — ACETAMINOPHEN 325 MG/1
TABLET ORAL
Status: COMPLETED
Start: 2022-08-24 | End: 2022-08-24

## 2022-08-24 RX ORDER — ACETAMINOPHEN 325 MG/1
650 TABLET ORAL ONCE
Status: DISCONTINUED | OUTPATIENT
Start: 2022-08-24 | End: 2022-08-24

## 2022-08-24 RX ORDER — MIDAZOLAM HYDROCHLORIDE 1 MG/ML
INJECTION INTRAMUSCULAR; INTRAVENOUS
Status: COMPLETED
Start: 2022-08-24 | End: 2022-08-24

## 2022-08-24 RX ADMIN — ACETAMINOPHEN 650 MG: 325 TABLET ORAL at 15:15:00

## 2022-08-24 NOTE — IVS NOTE
Hand off report to carl KUMARI, patient awake and alert,compression stocking to both legs,c/o tightness to both legs around knees and ankles, Dr Yodit Huddleston aware.

## 2022-08-24 NOTE — PRE-SEDATION ASSESSMENT
[facility]  IR Pre-Procedure Sedation Assessment    History of snoring or sleep or apnea? No    History of previous problems with anesthesia or sedation  No    Physical Findings:  Neck: nl ROM  CV: irregularly irregular  PULM: normal respiratory rate/effort    Mallampati Score:  II (hard and soft palate, upper portion of tonsils anduvula visible)    ASA Classification:   2.  Patient with mild systemic disease    Plan:   -IV moderate sedation    Terry Stanley MD

## 2022-08-24 NOTE — PROCEDURES
[facility]  Procedure Note    Esther Bowling Patient Status:  Outpatient in a Bed    1955 MRN [id number]   Location [facility] Attending Angela Pace MD   Hosp Day # 0 PCP Elma Ravi MD     Procedure: Nonthermal ablation of left great saphenous and right small saphenous veins, bilateral  vein sclerosis. Pre-Procedure Diagnosis: Venous insufficiency [I87.2]      Post-Procedure Diagnosis: Venous insufficiency [I87.2]    Anesthesia:  Local and Sedation    Findings:  Under ultrasound guidance, left GSV accessed at proximal calf. Nonthermal ablation performed. Under ultrasound guidance, three  veins in left calf accessed. 3% STS injected. Under ultrasound guidance, right AAV and three  veins accessed. 3% STS injected. Under ultrasound guidance, right SSV accessed. Nonthermal ablation performed. Specimens: None    Blood Loss:  5mL      Complications:  None    Drains:  none    Plan:  Compression stockings for 1 week. F/U ultrasound in one week.       Juanita Vega MD  2022

## 2022-08-24 NOTE — IVS NOTE
DISCHARGE NOTE     Pt is able to sit up and ambulate without difficulty. Pt voided and tolerated fluids and declined food. Procedural site remains dry and intact with good circulation, motion, and sensation. No signs and symptoms of bleeding/hematoma noted. IV access removed  Instruction provided, patient/family verbalizes understanding. Dr. Beth Saucedo spoke with patient/family post procedure. Patient unable to tolerate compression stockings. Dr. Beth Saucedo and Zakia BACON at bedside determined best plan of care for patient was to remove compression stockings. Stockings removed by Dr. Beth Saucedo and Zakia BACON. Zakia BACON placed new ACE wrap bandages to bilateral lower extremities.      Pt discharge via wheelchair to [address]     Follow up Appointment: US and Zakia BACON appointment on 08/31/2022    New Prescription: none

## 2022-08-26 ENCOUNTER — TELEPHONE (OUTPATIENT)
Dept: INTERNAL MEDICINE CLINIC | Facility: CLINIC | Age: 67
End: 2022-08-26

## 2022-08-26 DIAGNOSIS — I10 ESSENTIAL HYPERTENSION: Primary | ICD-10-CM

## 2022-08-26 DIAGNOSIS — E55.9 VITAMIN D DEFICIENCY: ICD-10-CM

## 2022-08-26 DIAGNOSIS — E53.8 B12 DEFICIENCY: ICD-10-CM

## 2022-08-26 NOTE — TELEPHONE ENCOUNTER
Pt scheduled annual for 10/4/22  Patient will go for labs prior to include:  Vit D  B12  Magnesium  Tasked to nursing

## 2022-08-31 ENCOUNTER — HOSPITAL ENCOUNTER (OUTPATIENT)
Dept: ULTRASOUND IMAGING | Facility: HOSPITAL | Age: 67
Discharge: HOME | End: 2022-08-31
Attending: RADIOLOGY
Payer: MEDICARE

## 2022-08-31 DIAGNOSIS — M79.89 LEG SWELLING: ICD-10-CM

## 2022-08-31 PROCEDURE — 93970 EXTREMITY STUDY: CPT | Performed by: RADIOLOGY

## 2022-10-14 ENCOUNTER — LAB ENCOUNTER (OUTPATIENT)
Dept: LAB | Age: 67
End: 2022-10-14
Attending: INTERNAL MEDICINE
Payer: MEDICARE

## 2022-10-14 DIAGNOSIS — E53.8 B12 DEFICIENCY: ICD-10-CM

## 2022-10-14 DIAGNOSIS — E55.9 VITAMIN D DEFICIENCY: ICD-10-CM

## 2022-10-14 DIAGNOSIS — I10 ESSENTIAL HYPERTENSION: ICD-10-CM

## 2022-10-14 LAB
ALBUMIN SERPL-MCNC: 3.6 G/DL (ref 3.4–5)
ALBUMIN/GLOB SERPL: 0.8 {RATIO} (ref 1–2)
ALP LIVER SERPL-CCNC: 89 U/L
ALT SERPL-CCNC: 20 U/L
ANION GAP SERPL CALC-SCNC: 7 MMOL/L (ref 0–18)
AST SERPL-CCNC: 25 U/L (ref 15–37)
BASOPHILS # BLD AUTO: 0.08 X10(3) UL (ref 0–0.2)
BASOPHILS NFR BLD AUTO: 1.9 %
BILIRUB SERPL-MCNC: 0.8 MG/DL (ref 0.1–2)
BUN BLD-MCNC: 12 MG/DL (ref 7–18)
BUN/CREAT SERPL: 15 (ref 10–20)
CALCIUM BLD-MCNC: 9.1 MG/DL (ref 8.5–10.1)
CHLORIDE SERPL-SCNC: 108 MMOL/L (ref 98–112)
CHOLEST SERPL-MCNC: 182 MG/DL (ref ?–200)
CO2 SERPL-SCNC: 29 MMOL/L (ref 21–32)
CREAT BLD-MCNC: 0.8 MG/DL
DEPRECATED RDW RBC AUTO: 43.8 FL (ref 35.1–46.3)
EOSINOPHIL # BLD AUTO: 0.15 X10(3) UL (ref 0–0.7)
EOSINOPHIL NFR BLD AUTO: 3.6 %
ERYTHROCYTE [DISTWIDTH] IN BLOOD BY AUTOMATED COUNT: 12.4 % (ref 11–15)
FASTING PATIENT LIPID ANSWER: YES
FASTING STATUS PATIENT QL REPORTED: YES
GFR SERPLBLD BASED ON 1.73 SQ M-ARVRAT: 80 ML/MIN/1.73M2 (ref 60–?)
GLOBULIN PLAS-MCNC: 4.4 G/DL (ref 2.8–4.4)
GLUCOSE BLD-MCNC: 95 MG/DL (ref 70–99)
HCT VFR BLD AUTO: 41.2 %
HDLC SERPL-MCNC: 71 MG/DL (ref 40–59)
HGB BLD-MCNC: 13.6 G/DL
IMM GRANULOCYTES # BLD AUTO: 0.01 X10(3) UL (ref 0–1)
IMM GRANULOCYTES NFR BLD: 0.2 %
LDLC SERPL CALC-MCNC: 96 MG/DL (ref ?–100)
LYMPHOCYTES # BLD AUTO: 0.96 X10(3) UL (ref 1–4)
LYMPHOCYTES NFR BLD AUTO: 23.4 %
MAGNESIUM SERPL-MCNC: 2.1 MG/DL (ref 1.6–2.6)
MCH RBC QN AUTO: 31.6 PG (ref 26–34)
MCHC RBC AUTO-ENTMCNC: 33 G/DL (ref 31–37)
MCV RBC AUTO: 95.6 FL
MONOCYTES # BLD AUTO: 0.49 X10(3) UL (ref 0.1–1)
MONOCYTES NFR BLD AUTO: 11.9 %
NEUTROPHILS # BLD AUTO: 2.42 X10 (3) UL (ref 1.5–7.7)
NEUTROPHILS # BLD AUTO: 2.42 X10(3) UL (ref 1.5–7.7)
NEUTROPHILS NFR BLD AUTO: 59 %
NONHDLC SERPL-MCNC: 111 MG/DL (ref ?–130)
OSMOLALITY SERPL CALC.SUM OF ELEC: 298 MOSM/KG (ref 275–295)
PLATELET # BLD AUTO: 194 10(3)UL (ref 150–450)
POTASSIUM SERPL-SCNC: 4.3 MMOL/L (ref 3.5–5.1)
PROT SERPL-MCNC: 8 G/DL (ref 6.4–8.2)
RBC # BLD AUTO: 4.31 X10(6)UL
SODIUM SERPL-SCNC: 144 MMOL/L (ref 136–145)
TRIGL SERPL-MCNC: 84 MG/DL (ref 30–149)
TSI SER-ACNC: 2.15 MIU/ML (ref 0.36–3.74)
VIT B12 SERPL-MCNC: 369 PG/ML (ref 193–986)
VIT D+METAB SERPL-MCNC: 14.9 NG/ML (ref 30–100)
VLDLC SERPL CALC-MCNC: 14 MG/DL (ref 0–30)
WBC # BLD AUTO: 4.1 X10(3) UL (ref 4–11)

## 2022-10-14 PROCEDURE — 84443 ASSAY THYROID STIM HORMONE: CPT

## 2022-10-14 PROCEDURE — 80053 COMPREHEN METABOLIC PANEL: CPT

## 2022-10-14 PROCEDURE — 85025 COMPLETE CBC W/AUTO DIFF WBC: CPT

## 2022-10-14 PROCEDURE — 82607 VITAMIN B-12: CPT

## 2022-10-14 PROCEDURE — 83735 ASSAY OF MAGNESIUM: CPT

## 2022-10-14 PROCEDURE — 36415 COLL VENOUS BLD VENIPUNCTURE: CPT

## 2022-10-14 PROCEDURE — 80061 LIPID PANEL: CPT

## 2022-10-14 PROCEDURE — 82306 VITAMIN D 25 HYDROXY: CPT

## 2022-10-18 ENCOUNTER — OFFICE VISIT (OUTPATIENT)
Dept: INTERNAL MEDICINE CLINIC | Facility: CLINIC | Age: 67
End: 2022-10-18
Payer: MEDICARE

## 2022-10-18 ENCOUNTER — OFFICE VISIT (OUTPATIENT)
Dept: PODIATRY CLINIC | Facility: CLINIC | Age: 67
End: 2022-10-18
Payer: MEDICARE

## 2022-10-18 VITALS
WEIGHT: 247 LBS | HEART RATE: 88 BPM | TEMPERATURE: 98 F | OXYGEN SATURATION: 99 % | DIASTOLIC BLOOD PRESSURE: 72 MMHG | HEIGHT: 60 IN | BODY MASS INDEX: 48.49 KG/M2 | SYSTOLIC BLOOD PRESSURE: 128 MMHG

## 2022-10-18 DIAGNOSIS — M79.674 PAIN IN TOES OF BOTH FEET: Primary | ICD-10-CM

## 2022-10-18 DIAGNOSIS — Z00.00 ROUTINE HEALTH MAINTENANCE: ICD-10-CM

## 2022-10-18 DIAGNOSIS — M79.675 PAIN IN TOES OF BOTH FEET: Primary | ICD-10-CM

## 2022-10-18 DIAGNOSIS — Z00.00 MEDICARE ANNUAL WELLNESS VISIT, SUBSEQUENT: Primary | ICD-10-CM

## 2022-10-18 DIAGNOSIS — E55.9 VITAMIN D DEFICIENCY: ICD-10-CM

## 2022-10-18 DIAGNOSIS — I10 ESSENTIAL HYPERTENSION: ICD-10-CM

## 2022-10-18 DIAGNOSIS — B35.1 ONYCHOMYCOSIS: ICD-10-CM

## 2022-10-18 DIAGNOSIS — I83.93 VARICOSE VEINS OF BOTH LOWER EXTREMITIES WITHOUT ULCER OR INFLAMMATION: ICD-10-CM

## 2022-10-18 DIAGNOSIS — I48.91 ATRIAL FIBRILLATION, UNSPECIFIED TYPE (HCC): ICD-10-CM

## 2022-10-18 PROCEDURE — 11721 DEBRIDE NAIL 6 OR MORE: CPT | Performed by: PODIATRIST

## 2022-10-18 PROCEDURE — 1126F AMNT PAIN NOTED NONE PRSNT: CPT | Performed by: PODIATRIST

## 2022-10-18 PROCEDURE — 99214 OFFICE O/P EST MOD 30 MIN: CPT | Performed by: INTERNAL MEDICINE

## 2022-10-18 PROCEDURE — 1126F AMNT PAIN NOTED NONE PRSNT: CPT | Performed by: INTERNAL MEDICINE

## 2022-10-18 PROCEDURE — G0439 PPPS, SUBSEQ VISIT: HCPCS | Performed by: INTERNAL MEDICINE

## 2023-03-02 ENCOUNTER — OFFICE VISIT (OUTPATIENT)
Dept: PODIATRY CLINIC | Facility: CLINIC | Age: 68
End: 2023-03-02

## 2023-03-02 DIAGNOSIS — M79.674 PAIN IN TOES OF BOTH FEET: Primary | ICD-10-CM

## 2023-03-02 DIAGNOSIS — B35.1 ONYCHOMYCOSIS: ICD-10-CM

## 2023-03-02 DIAGNOSIS — M79.675 PAIN IN TOES OF BOTH FEET: Primary | ICD-10-CM

## 2023-03-02 DIAGNOSIS — I87.2 VENOUS INSUFFICIENCY: ICD-10-CM

## 2023-03-02 PROCEDURE — 1126F AMNT PAIN NOTED NONE PRSNT: CPT | Performed by: STUDENT IN AN ORGANIZED HEALTH CARE EDUCATION/TRAINING PROGRAM

## 2023-03-02 PROCEDURE — 99213 OFFICE O/P EST LOW 20 MIN: CPT | Performed by: STUDENT IN AN ORGANIZED HEALTH CARE EDUCATION/TRAINING PROGRAM

## 2023-05-11 ENCOUNTER — OFFICE VISIT (OUTPATIENT)
Dept: INTERNAL MEDICINE CLINIC | Facility: CLINIC | Age: 68
End: 2023-05-11

## 2023-05-11 VITALS
WEIGHT: 254.63 LBS | SYSTOLIC BLOOD PRESSURE: 128 MMHG | BODY MASS INDEX: 49.99 KG/M2 | TEMPERATURE: 98 F | HEART RATE: 67 BPM | HEIGHT: 60 IN | DIASTOLIC BLOOD PRESSURE: 76 MMHG | OXYGEN SATURATION: 99 %

## 2023-05-11 DIAGNOSIS — E53.8 VITAMIN B 12 DEFICIENCY: ICD-10-CM

## 2023-05-11 DIAGNOSIS — E55.9 VITAMIN D DEFICIENCY: ICD-10-CM

## 2023-05-11 DIAGNOSIS — R53.81 PHYSICAL DECONDITIONING: ICD-10-CM

## 2023-05-11 DIAGNOSIS — L98.9 SKIN ABNORMALITY: ICD-10-CM

## 2023-05-11 DIAGNOSIS — I48.91 ATRIAL FIBRILLATION, UNSPECIFIED TYPE (HCC): ICD-10-CM

## 2023-05-11 DIAGNOSIS — I83.93 VARICOSE VEINS OF BOTH LOWER EXTREMITIES WITHOUT ULCER OR INFLAMMATION: ICD-10-CM

## 2023-05-11 DIAGNOSIS — Z00.00 ROUTINE HEALTH MAINTENANCE: ICD-10-CM

## 2023-05-11 DIAGNOSIS — I10 ESSENTIAL HYPERTENSION: Primary | ICD-10-CM

## 2023-05-11 PROCEDURE — 99214 OFFICE O/P EST MOD 30 MIN: CPT | Performed by: INTERNAL MEDICINE

## 2023-05-11 PROCEDURE — 1126F AMNT PAIN NOTED NONE PRSNT: CPT | Performed by: INTERNAL MEDICINE

## 2023-07-20 ENCOUNTER — OFFICE VISIT (OUTPATIENT)
Dept: PODIATRY CLINIC | Facility: CLINIC | Age: 68
End: 2023-07-20

## 2023-07-20 DIAGNOSIS — I87.2 VENOUS INSUFFICIENCY: ICD-10-CM

## 2023-07-20 DIAGNOSIS — M79.675 PAIN IN TOES OF BOTH FEET: Primary | ICD-10-CM

## 2023-07-20 DIAGNOSIS — M79.674 PAIN IN TOES OF BOTH FEET: Primary | ICD-10-CM

## 2023-07-20 DIAGNOSIS — B35.1 ONYCHOMYCOSIS: ICD-10-CM

## 2023-07-20 PROCEDURE — 1125F AMNT PAIN NOTED PAIN PRSNT: CPT | Performed by: STUDENT IN AN ORGANIZED HEALTH CARE EDUCATION/TRAINING PROGRAM

## 2023-07-20 PROCEDURE — 99213 OFFICE O/P EST LOW 20 MIN: CPT | Performed by: STUDENT IN AN ORGANIZED HEALTH CARE EDUCATION/TRAINING PROGRAM

## 2023-07-20 NOTE — PROGRESS NOTES
[facility] Podiatry  Progress Note          HPI:               Patient is a pleasant 69-year-old female presents to clinic for bilateral foot evaluation. .  She admits to painful and elongated toenails that she is unable to trim herself. Denies any pedal injuries or trauma and acute signs of infection. Allergies: Amoxicillin, Bioflex, Doxycycline, Doxycycline Hyclate, Minocycline, Orphenadrine, Ra Glucosamine-Chondroitin, Orphenadrine Citrate, Shellfish, Sulfanilamide, Cefaclor, Clarithromycin, and Metronidazole    Current Outpatient Medications   Medication Sig Dispense Refill    Potassium Chloride ER 8 MEQ Oral Tab CR Take 1 tablet (8 mEq total) by mouth daily. 90 tablet 3    TRIAMTERENE-HYDROCHLOROTHIAZIDE 37.5-25 MG Oral Cap TAKE ONE CAPSULE BY MOUTH ONE TIME DAILY 90 capsule 3    albuterol 108 (90 Base) MCG/ACT Inhalation Aero Soln Inhale 2 puffs into the lungs every 4 (four) hours as needed for Wheezing. 8.5 g 11    Ibuprofen 200 MG Oral Cap Take 1 capsule (200 mg total) by mouth every 6 (six) hours as needed. CARTIA  MG Oral Capsule SR 24 Hr Take 1 capsule (120 mg total) by mouth daily. acetaminophen 500 MG Oral Tab Take 1 tablet (500 mg total) by mouth every 6 (six) hours as needed for Pain. Vitamin B-12 1000 MCG Oral Tab Take 1 tablet (1,000 mcg total) by mouth every other day. ELIQUIS 5 MG Oral Tab Take 1 tablet (5 mg total) by mouth 2 (two) times daily. 0    Vitamin C (VITAMIN C) 500 MG Oral Tab Take 1 tablet (500 mg total) by mouth every other day. Calcium Carb-Cholecalciferol 600-800 MG-UNIT Oral Tab Take 1 tablet by mouth every other day. Vitamin D3 (VITAMIN D3) 1000 UNITS Oral Tab Take 1 tablet (1,000 Units total) by mouth daily. Multiple Vitamin (MULTIVITAMINS) Oral Cap Take 1 capsule by mouth every other day. loratadine (CLARITIN) 10 MG Oral Tab Take 1 tablet (10 mg total) by mouth daily as needed.         Past Medical History:   Diagnosis Date    Abnormal mammogram     Allergic rhinitis     Arrhythmia     Arthritis     Asthma     Atrial fibrillation (HCC)     Bronchitis     Elevated blood pressure     Extrinsic asthma, unspecified     Varicose veins     Varicose veins     Venous insufficiency     Visual impairment     Vitamin D deficiency       Past Surgical History:   Procedure Laterality Date    BREAST BIOPSY Left 2012    COLONOSCOPY N/A 2018    Procedure: COLONOSCOPY;  Surgeon: Rashard Obando MD;  Location: [address] [place] [address] ENDOSCOPY    ECHO 2D, CARDIO (INTERNAL)  16    EF 55-60%    ELECTROCARDIOGRAM, COMPLETE  2013    Scanned to media tab - DOS 2013    HERNIA SURGERY  2017    Repair of incarcerated hernia by Dr Otilia Valdes      right    VENTRAL HERNIA REPAIR  2017      Family History   Problem Relation Age of Onset    Pulmonary Disease Father         COPD    Hypertension Father     Other (Other) Father     Skin cancer Sister          of metastatic vulvar cancer    Hypertension Sister     Heart Attack Brother         MI    Heart Surgery Brother         CABG    Cataracts Sister       Social History    Socioeconomic History      Marital status:     Tobacco Use      Smoking status: Former        Packs/day: 0.40        Years: 33.00        Pack years: 13.2        Types: Cigarettes        Quit date: 6/15/1974        Years since quittin.1      Smokeless tobacco: Never    Vaping Use      Vaping Use: Never used    Substance and Sexual Activity      Alcohol use:  Yes        Alcohol/week: 2.0 standard drinks of alcohol        Types: 2 Cans of beer per week        Comment: once a wk      Drug use: No    Other Topics      Concerns:        Caffeine Concern: Yes          Coffee / Soda occasionally          REVIEW OF SYSTEMS:     Today reviewed systems as documented below  GENERAL HEALTH: feels well otherwise  SKIN: denies any unusual skin lesions or rashes  RESPIRATORY: denies shortness of breath with exertion  CARDIOVASCULAR: denies chest pain on exertion  GI: denies abdominal pain and denies heartburn  NEURO: denies headaches  MUSCULO: denies arthritis, back pain      EXAM:   There were no vitals taken for this visit. GENERAL: well developed, well nourished, in no apparent distress  EXTREMITIES:   1. Integument: Normal skin temperature and turgor. Toenails x10 are elongated, thickened and dystrophic.  2. Vascular: Dorsalis pedis two out of four bilateral and posterior tibial pulses two out of   four bilateral, capillary refill normal. +3 bilateral lower extremity pitting edema. Tenderness with palpation to toenails x10. Varicose veins present to bilateral lower extremities. 3. Musculoskeletal: All muscle groups are graded 5 out of 5 in the foot and ankle. 4. Neurological: Normal sharp dull sensation; reflexes normal.             ASSESSMENT AND PLAN:   Diagnoses and all orders for this visit:    Pain in toes of both feet    Onychomycosis    Venous insufficiency          Plan:       -Patient examined, chart history reviewed. -Discussed importance of proper pedal hygiene, regular foot checks, and tight glucose control.  -Sharply debrided nails x10 with a sterile nail nipper achieving a 20% reduction in thickness and length, without incident.   -Ambulate with supportive shoes and inserts and avoid walking barefoot.  -Educated patient on acute signs of infection advised patient to seek immediate medical attention if symptoms arise.  -The etiology of edema was discussed with the patient. Discussion was made on various ways to control the lower extremity swelling which include leg elevation, compression stockings, as well as diet and diuresis management by the patient's primary care physician. The patient indicates understanding of these issues and agrees to the plan.         Avram Mcardle, DPM

## 2023-08-14 RX ORDER — TRIAMTERENE AND HYDROCHLOROTHIAZIDE 37.5; 25 MG/1; MG/1
CAPSULE ORAL
Qty: 90 CAPSULE | Refills: 3 | Status: SHIPPED | OUTPATIENT
Start: 2023-08-14

## 2023-08-14 NOTE — TELEPHONE ENCOUNTER
Refill request is for a maintenance medication and has met the criteria specified in the Ambulatory Medication Refill Standing Order for eligibility, visits, laboratory, alerts and was sent to the requested pharmacy.     Requested Prescriptions     Signed Prescriptions Disp Refills    triamterene-hydroCHLOROthiazide 37.5-25 MG Oral Cap 90 capsule 3     Sig: TAKE ONE CAPSULE BY MOUTH ONE TIME DAILY     Authorizing Provider: Fiona Holden     Ordering User: Kaushik Romano

## 2023-08-25 ENCOUNTER — MED REC SCAN ONLY (OUTPATIENT)
Dept: INTERNAL MEDICINE CLINIC | Facility: CLINIC | Age: 68
End: 2023-08-25

## 2023-10-05 ENCOUNTER — TELEPHONE (OUTPATIENT)
Dept: INTERNAL MEDICINE CLINIC | Facility: CLINIC | Age: 68
End: 2023-10-05

## 2023-10-05 DIAGNOSIS — R26.89 BALANCE DISORDER: ICD-10-CM

## 2023-10-05 DIAGNOSIS — R53.81 PHYSICAL DECONDITIONING: Primary | ICD-10-CM

## 2023-10-05 NOTE — TELEPHONE ENCOUNTER
Pt requests order for physical therapy  Pt spoke to Dr Benito Nascimento about this at her last [address] is for strength & balance   Pt thinks she will go the [facility] in [place]

## 2023-10-06 NOTE — TELEPHONE ENCOUNTER
Spoke to pt, in the process of clarifying where she wanted to get PT, she requested that PT order be mailed to her instead of faxig order to [facility] in [place]. Address verified.

## 2023-11-02 ENCOUNTER — OFFICE VISIT (OUTPATIENT)
Dept: PODIATRY CLINIC | Facility: CLINIC | Age: 68
End: 2023-11-02
Payer: MEDICARE

## 2023-11-02 DIAGNOSIS — I87.2 VENOUS INSUFFICIENCY: ICD-10-CM

## 2023-11-02 DIAGNOSIS — M79.675 PAIN IN TOES OF BOTH FEET: Primary | ICD-10-CM

## 2023-11-02 DIAGNOSIS — M79.674 PAIN IN TOES OF BOTH FEET: Primary | ICD-10-CM

## 2023-11-02 DIAGNOSIS — B35.1 ONYCHOMYCOSIS: ICD-10-CM

## 2023-11-02 PROCEDURE — 1126F AMNT PAIN NOTED NONE PRSNT: CPT | Performed by: STUDENT IN AN ORGANIZED HEALTH CARE EDUCATION/TRAINING PROGRAM

## 2023-11-02 PROCEDURE — 99213 OFFICE O/P EST LOW 20 MIN: CPT | Performed by: STUDENT IN AN ORGANIZED HEALTH CARE EDUCATION/TRAINING PROGRAM

## 2023-11-02 NOTE — PROGRESS NOTES
[id number] [facility]  Progress Note      Wellington Adame is a 70year old female. Patient presents with:  Toenail Care: 3 mo f/u - has no c/o regarding her feet             HPI:     Patient is a pleasant 67-year-old female who presents to clinic today for bilateral foot evaluation. Denies any pedal injuries or trauma. Allergies: Amoxicillin, Bioflex, Doxycycline, Doxycycline Hyclate, Minocycline, Orphenadrine, Ra Glucosamine-Chondroitin, Orphenadrine Citrate, Shellfish, Sulfanilamide, Cefaclor, Clarithromycin, and Metronidazole    Current Outpatient Medications   Medication Sig Dispense Refill    triamterene-hydroCHLOROthiazide 37.5-25 MG Oral Cap TAKE ONE CAPSULE BY MOUTH ONE TIME DAILY 90 capsule 3    Potassium Chloride ER 8 MEQ Oral Tab CR Take 1 tablet (8 mEq total) by mouth daily. 90 tablet 3    albuterol 108 (90 Base) MCG/ACT Inhalation Aero Soln Inhale 2 puffs into the lungs every 4 (four) hours as needed for Wheezing. 8.5 g 11    Ibuprofen 200 MG Oral Cap Take 1 capsule (200 mg total) by mouth every 6 (six) hours as needed. CARTIA  MG Oral Capsule SR 24 Hr Take 1 capsule (120 mg total) by mouth daily. acetaminophen 500 MG Oral Tab Take 1 tablet (500 mg total) by mouth every 6 (six) hours as needed for Pain. Vitamin B-12 1000 MCG Oral Tab Take 1 tablet (1,000 mcg total) by mouth every other day. ELIQUIS 5 MG Oral Tab Take 1 tablet (5 mg total) by mouth 2 (two) times daily. 0    Vitamin C (VITAMIN C) 500 MG Oral Tab Take 1 tablet (500 mg total) by mouth every other day. Calcium Carb-Cholecalciferol 600-800 MG-UNIT Oral Tab Take 1 tablet by mouth every other day. Vitamin D3 (VITAMIN D3) 1000 UNITS Oral Tab Take 1 tablet (1,000 Units total) by mouth daily. Multiple Vitamin (MULTIVITAMINS) Oral Cap Take 1 capsule by mouth every other day. loratadine (CLARITIN) 10 MG Oral Tab Take 1 tablet (10 mg total) by mouth daily as needed.         Past Medical History:   Diagnosis Date    Abnormal mammogram     Allergic rhinitis     Arrhythmia     Arthritis     Asthma     Atrial fibrillation (HCC)     Bronchitis     Elevated blood pressure     Extrinsic asthma, unspecified     Varicose veins     Varicose veins     Venous insufficiency     Visual impairment     Vitamin D deficiency       Past Surgical History:   Procedure Laterality Date    BREAST BIOPSY Left 2012    COLONOSCOPY N/A 2018    Procedure: COLONOSCOPY;  Surgeon: Rashard Obando MD;  Location: [address] [place] [address] ENDOSCOPY    ECHO 2D, CARDIO (INTERNAL)  16    EF 55-60%    ELECTROCARDIOGRAM, COMPLETE  2013    Scanned to media tab - DOS 2013    HERNIA SURGERY  2017    Repair of incarcerated hernia by Dr Otilia Valdes      right    VENTRAL HERNIA REPAIR  2017      Family History   Problem Relation Age of Onset    Pulmonary Disease Father         COPD    Hypertension Father     Other (Other) Father     Skin cancer Sister          of metastatic vulvar cancer    Hypertension Sister     Heart Attack Brother         MI    Heart Surgery Brother         CABG    Cataracts Sister       Social History    Socioeconomic History      Marital status:     Tobacco Use      Smoking status: Former        Packs/day: 0.40        Years: 33.00        Additional pack years: 0.00        Total pack years: 13.20        Types: Cigarettes        Quit date: 6/15/1974        Years since quittin.4      Smokeless tobacco: Never    Vaping Use      Vaping Use: Never used    Substance and Sexual Activity      Alcohol use:  Yes        Alcohol/week: 2.0 standard drinks of alcohol        Types: 2 Cans of beer per week        Comment: once a wk      Drug use: No    Other Topics      Concerns:        Caffeine Concern: Yes          Coffee / Soda occasionally          REVIEW OF SYSTEMS:     Denies nause, fever, chills  No calf pain  Denies chest pain or SOB      EXAM:   There were no vitals taken for this visit. GENERAL: well developed, well nourished, in no apparent distress  EXTREMITIES:   1. Integument: Normal skin temperature and turgor. Toenails x10 are elongated, thickened and discolored with subungal derbi. 2. Vascular: Dorsalis pedis two out of four bilateral and posterior tibial pulses two out of   four bilateral, capillary refill normal.   3. Musculoskeletal: All muscle groups are graded 5 out of 5 in the foot and ankle. 4. Neurological: Normal sharp dull sensation; reflexes normal.             ASSESSMENT AND PLAN:   Diagnoses and all orders for this visit:    Pain in toes of both feet    Onychomycosis    Venous insufficiency        Plan:       -Patient examined, chart history reviewed. -Discussed importance of proper pedal hygiene, regular foot checks, and tight glucose control.  -Sharply debrided nails x10 with a sterile nail nipper achieving a 20% reduction in thickness and length, without incident.   -Ambulate with supportive shoes and inserts and avoid walking barefoot.  -Educated patient on acute signs of infection advised patient to seek immediate medical attention if symptoms arise. The patient indicates understanding of these issues and agrees to the plan.         Mirlande Ambrose DPM

## 2023-11-10 ENCOUNTER — LAB ENCOUNTER (OUTPATIENT)
Dept: LAB | Age: 68
End: 2023-11-10
Attending: INTERNAL MEDICINE
Payer: MEDICARE

## 2023-11-10 DIAGNOSIS — E55.9 VITAMIN D DEFICIENCY: ICD-10-CM

## 2023-11-10 DIAGNOSIS — I48.91 ATRIAL FIBRILLATION, UNSPECIFIED TYPE (HCC): ICD-10-CM

## 2023-11-10 DIAGNOSIS — I10 ESSENTIAL HYPERTENSION: ICD-10-CM

## 2023-11-10 DIAGNOSIS — E53.8 VITAMIN B 12 DEFICIENCY: ICD-10-CM

## 2023-11-10 LAB
ALBUMIN SERPL-MCNC: 4.4 G/DL (ref 3.2–4.8)
ALBUMIN/GLOB SERPL: 1.2 {RATIO} (ref 1–2)
ALP LIVER SERPL-CCNC: 79 U/L
ALT SERPL-CCNC: 14 U/L
ANION GAP SERPL CALC-SCNC: 10 MMOL/L (ref 0–18)
AST SERPL-CCNC: 28 U/L (ref ?–34)
BASOPHILS # BLD AUTO: 0.06 X10(3) UL (ref 0–0.2)
BASOPHILS NFR BLD AUTO: 1.3 %
BILIRUB SERPL-MCNC: 1.2 MG/DL (ref 0.2–1.1)
BILIRUB UR QL: NEGATIVE
BUN BLD-MCNC: 11 MG/DL (ref 9–23)
BUN/CREAT SERPL: 13.6 (ref 10–20)
CALCIUM BLD-MCNC: 9.9 MG/DL (ref 8.7–10.4)
CHLORIDE SERPL-SCNC: 101 MMOL/L (ref 98–112)
CHOLEST SERPL-MCNC: 185 MG/DL (ref ?–200)
CO2 SERPL-SCNC: 26 MMOL/L (ref 21–32)
COLOR UR: YELLOW
CREAT BLD-MCNC: 0.81 MG/DL
DEPRECATED RDW RBC AUTO: 41.9 FL (ref 35.1–46.3)
EGFRCR SERPLBLD CKD-EPI 2021: 79 ML/MIN/1.73M2 (ref 60–?)
EOSINOPHIL # BLD AUTO: 0.18 X10(3) UL (ref 0–0.7)
EOSINOPHIL NFR BLD AUTO: 3.9 %
ERYTHROCYTE [DISTWIDTH] IN BLOOD BY AUTOMATED COUNT: 12.4 % (ref 11–15)
FASTING PATIENT LIPID ANSWER: YES
FASTING STATUS PATIENT QL REPORTED: YES
GLOBULIN PLAS-MCNC: 3.6 G/DL (ref 2.8–4.4)
GLUCOSE BLD-MCNC: 95 MG/DL (ref 70–99)
GLUCOSE UR-MCNC: NORMAL MG/DL
HCT VFR BLD AUTO: 42 %
HDLC SERPL-MCNC: 67 MG/DL (ref 40–59)
HGB BLD-MCNC: 14.3 G/DL
HGB UR QL STRIP.AUTO: NEGATIVE
HYALINE CASTS #/AREA URNS AUTO: PRESENT /LPF
IMM GRANULOCYTES # BLD AUTO: 0.01 X10(3) UL (ref 0–1)
IMM GRANULOCYTES NFR BLD: 0.2 %
KETONES UR-MCNC: NEGATIVE MG/DL
LDLC SERPL CALC-MCNC: 102 MG/DL (ref ?–100)
LEUKOCYTE ESTERASE UR QL STRIP.AUTO: 250
LYMPHOCYTES # BLD AUTO: 1.05 X10(3) UL (ref 1–4)
LYMPHOCYTES NFR BLD AUTO: 22.7 %
MCH RBC QN AUTO: 31.4 PG (ref 26–34)
MCHC RBC AUTO-ENTMCNC: 34 G/DL (ref 31–37)
MCV RBC AUTO: 92.1 FL
MONOCYTES # BLD AUTO: 0.57 X10(3) UL (ref 0.1–1)
MONOCYTES NFR BLD AUTO: 12.3 %
NEUTROPHILS # BLD AUTO: 2.75 X10 (3) UL (ref 1.5–7.7)
NEUTROPHILS # BLD AUTO: 2.75 X10(3) UL (ref 1.5–7.7)
NEUTROPHILS NFR BLD AUTO: 59.6 %
NITRITE UR QL STRIP.AUTO: NEGATIVE
NONHDLC SERPL-MCNC: 118 MG/DL (ref ?–130)
OSMOLALITY SERPL CALC.SUM OF ELEC: 283 MOSM/KG (ref 275–295)
PH UR: 7.5 [PH] (ref 5–8)
PLATELET # BLD AUTO: 223 10(3)UL (ref 150–450)
POTASSIUM SERPL-SCNC: 3.5 MMOL/L (ref 3.5–5.1)
PROT SERPL-MCNC: 8 G/DL (ref 5.7–8.2)
PROT UR-MCNC: 100 MG/DL
RBC # BLD AUTO: 4.56 X10(6)UL
SODIUM SERPL-SCNC: 137 MMOL/L (ref 136–145)
SP GR UR STRIP: 1.01 (ref 1–1.03)
TRIGL SERPL-MCNC: 87 MG/DL (ref 30–149)
TSI SER-ACNC: 1.72 MIU/ML (ref 0.55–4.78)
UROBILINOGEN UR STRIP-ACNC: NORMAL
VIT B12 SERPL-MCNC: 479 PG/ML (ref 211–911)
VIT D+METAB SERPL-MCNC: 22.6 NG/ML (ref 30–100)
VLDLC SERPL CALC-MCNC: 14 MG/DL (ref 0–30)
WBC # BLD AUTO: 4.6 X10(3) UL (ref 4–11)

## 2023-11-10 PROCEDURE — 82607 VITAMIN B-12: CPT

## 2023-11-10 PROCEDURE — 82306 VITAMIN D 25 HYDROXY: CPT

## 2023-11-10 PROCEDURE — 87086 URINE CULTURE/COLONY COUNT: CPT | Performed by: INTERNAL MEDICINE

## 2023-11-10 PROCEDURE — 80061 LIPID PANEL: CPT

## 2023-11-10 PROCEDURE — 81001 URINALYSIS AUTO W/SCOPE: CPT | Performed by: INTERNAL MEDICINE

## 2023-11-10 PROCEDURE — 84443 ASSAY THYROID STIM HORMONE: CPT

## 2023-11-10 PROCEDURE — 36415 COLL VENOUS BLD VENIPUNCTURE: CPT

## 2023-11-10 PROCEDURE — 85025 COMPLETE CBC W/AUTO DIFF WBC: CPT

## 2023-11-10 PROCEDURE — 80053 COMPREHEN METABOLIC PANEL: CPT

## 2023-11-14 ENCOUNTER — OFFICE VISIT (OUTPATIENT)
Dept: INTERNAL MEDICINE CLINIC | Facility: CLINIC | Age: 68
End: 2023-11-14

## 2023-11-14 VITALS
RESPIRATION RATE: 20 BRPM | BODY MASS INDEX: 51.04 KG/M2 | HEART RATE: 63 BPM | TEMPERATURE: 98 F | SYSTOLIC BLOOD PRESSURE: 126 MMHG | HEIGHT: 60 IN | DIASTOLIC BLOOD PRESSURE: 80 MMHG | WEIGHT: 260 LBS | OXYGEN SATURATION: 98 %

## 2023-11-14 DIAGNOSIS — E55.9 VITAMIN D DEFICIENCY: ICD-10-CM

## 2023-11-14 DIAGNOSIS — I10 ESSENTIAL HYPERTENSION: ICD-10-CM

## 2023-11-14 DIAGNOSIS — Z00.00 ROUTINE HEALTH MAINTENANCE: ICD-10-CM

## 2023-11-14 DIAGNOSIS — I83.93 VARICOSE VEINS OF BOTH LOWER EXTREMITIES WITHOUT ULCER OR INFLAMMATION: ICD-10-CM

## 2023-11-14 DIAGNOSIS — I48.91 ATRIAL FIBRILLATION, UNSPECIFIED TYPE (HCC): ICD-10-CM

## 2023-11-14 DIAGNOSIS — R31.29 MICROSCOPIC HEMATURIA: ICD-10-CM

## 2023-11-14 DIAGNOSIS — Z00.00 MEDICARE ANNUAL WELLNESS VISIT, SUBSEQUENT: Primary | ICD-10-CM

## 2023-11-14 DIAGNOSIS — S81.801A WOUND OF RIGHT LOWER EXTREMITY, INITIAL ENCOUNTER: ICD-10-CM

## 2023-11-14 DIAGNOSIS — Z23 FLU VACCINE NEED: ICD-10-CM

## 2023-11-14 DIAGNOSIS — I87.2 VENOUS INSUFFICIENCY: ICD-10-CM

## 2023-11-14 PROBLEM — K43.6 VENTRAL HERNIA WITH OBSTRUCTION AND WITHOUT GANGRENE: Status: RESOLVED | Noted: 2017-09-10 | Resolved: 2023-11-14

## 2023-11-14 PROBLEM — K43.0 INCISIONAL HERNIA, INCARCERATED: Status: RESOLVED | Noted: 2017-09-11 | Resolved: 2023-11-14

## 2023-11-14 PROCEDURE — 99214 OFFICE O/P EST MOD 30 MIN: CPT | Performed by: INTERNAL MEDICINE

## 2023-11-14 PROCEDURE — 1126F AMNT PAIN NOTED NONE PRSNT: CPT | Performed by: INTERNAL MEDICINE

## 2023-11-14 PROCEDURE — G0439 PPPS, SUBSEQ VISIT: HCPCS | Performed by: INTERNAL MEDICINE

## 2023-12-05 ENCOUNTER — TELEPHONE (OUTPATIENT)
Dept: INTERNAL MEDICINE CLINIC | Facility: CLINIC | Age: 68
End: 2023-12-05

## 2023-12-05 DIAGNOSIS — B34.9 VIRAL SYNDROME: Primary | ICD-10-CM

## 2023-12-05 NOTE — TELEPHONE ENCOUNTER
Message and symptoms noted.    Please let patient know that the symptoms sound viral.  COVID testing can turn positive after 2 to 3 days of symptoms.  Please ask her what day her symptoms started.    I would ask her to recheck her COVID test today.  The other viruses that she can have are flu and RSV.  If today's COVID test is positive then we have our answer.    I placed order for COVID, flu and RSV that she can schedule through [facility].  Hopefully she can get tomorrow.  She should call scheduling now.  Results take 48 hours.  For now we would not prescribe antibiotics until test results return.    Orders in place.

## 2023-12-05 NOTE — TELEPHONE ENCOUNTER
Patient is calling she has been sick since 11/23, she is experiencing runny nose, cough, sweating.    Patient took a covid test it was negative    Patient would like to be seen, please call [phone]

## 2023-12-05 NOTE — TELEPHONE ENCOUNTER
Original call stated symptoms started on 11/23.     Called patient and relayed MD's message. Patient stated she will try to go tomorrow for testing.

## 2023-12-05 NOTE — TELEPHONE ENCOUNTER
COVID triage:    Start of symptoms:     Fever:  [x]  No fever  []  Temperature:   [x]  Chills  [x]  Night sweats    Cough:  [x] Productive cough  [] Cough with exertion  [] Dry cough    Breathing:  [x] Wheezing - occasional  [] Pain with deep breathing  [] SOB with exertion  [] SOB at rest  [] Heavy breathing  [] Chest discomfort with deep breathing or coughing    GI Symptoms:  [] Diarrhea  [] Nausea  [] Vomiting  [] Abdominal pain  [x] Lack of appetite    Other symptoms:  [x] Sore throat - just dry  [] Difficulty swallowing  [x] Nasal drainage  [] Nasal congestion  [] PND  [] Sinus pressure  [] Chest congestion  [] Head congestion  [] Facial pain   [] Ear pain  [x] Body aches  [] Loss of sense of smell   [] Loss of sense of taste  []Conjunctivitis  [] Headache  [] Fatigue  [] Weakness    [x]OTC Medications: DayQuil, Halls cough drops, Mucinex      [x] Any recent travel? [place] napoles in [place]  [x] Any sick contacts? Son has a cold  [] Are you a healthcare worker?    Vaccinated: Yes  [x]   No []  Booster:  Yes  [x]  No []      Patient took a Covid test on Sunday or Monday - negative. Pharmacy is [facility] in [place].     To Dr. [facility] to please advise--

## 2024-02-06 ENCOUNTER — OFFICE VISIT (OUTPATIENT)
Dept: PODIATRY CLINIC | Facility: CLINIC | Age: 69
End: 2024-02-06
Payer: MEDICARE

## 2024-02-06 DIAGNOSIS — M79.675 PAIN IN TOES OF BOTH FEET: ICD-10-CM

## 2024-02-06 DIAGNOSIS — B35.1 ONYCHOMYCOSIS: Primary | ICD-10-CM

## 2024-02-06 DIAGNOSIS — M79.674 PAIN IN TOES OF BOTH FEET: ICD-10-CM

## 2024-02-06 PROCEDURE — 99213 OFFICE O/P EST LOW 20 MIN: CPT | Performed by: STUDENT IN AN ORGANIZED HEALTH CARE EDUCATION/TRAINING PROGRAM

## 2024-02-06 NOTE — PROGRESS NOTES
[facility]  Progress Note      Sonali Snider is a 68 year old female.   Chief Complaint   Patient presents with    Toenail Care     3 month follow up- No complaints regarding her feet. Denies pain.              HPI:     Patient is a pleasant 68-year-old female who presents to clinic today for bilateral foot evaluation.  Denies any pedal injuries or trauma.    Allergies: Amoxicillin, Bioflex, Doxycycline, Doxycycline hyclate, Minocycline, Orphenadrine, Ra glucosamine-chondroitin, Orphenadrine citrate, Shellfish, Sulfanilamide, Cefaclor, Clarithromycin, and Metronidazole    Current Outpatient Medications   Medication Sig Dispense Refill    triamterene-hydroCHLOROthiazide 37.5-25 MG Oral Cap TAKE ONE CAPSULE BY MOUTH ONE TIME DAILY 90 capsule 3    Potassium Chloride ER 8 MEQ Oral Tab CR Take 1 tablet (8 mEq total) by mouth daily. 90 tablet 3    albuterol 108 (90 Base) MCG/ACT Inhalation Aero Soln Inhale 2 puffs into the lungs every 4 (four) hours as needed for Wheezing. 8.5 g 11    Ibuprofen 200 MG Oral Cap Take 1 capsule (200 mg total) by mouth every 6 (six) hours as needed.      CARTIA  MG Oral Capsule SR 24 Hr Take 1 capsule (120 mg total) by mouth daily.      acetaminophen 500 MG Oral Tab Take 1 tablet (500 mg total) by mouth every 6 (six) hours as needed for Pain.      Vitamin B-12 1000 MCG Oral Tab Take 1 tablet (1,000 mcg total) by mouth every other day.      ELIQUIS 5 MG Oral Tab Take 1 tablet (5 mg total) by mouth 2 (two) times daily.  0    Vitamin C (VITAMIN C) 500 MG Oral Tab Take 1 tablet (500 mg total) by mouth every other day.      Calcium Carb-Cholecalciferol 600-800 MG-UNIT Oral Tab Take 1 tablet by mouth every other day.        Vitamin D3 (VITAMIN D3) 1000 UNITS Oral Tab Take 1 tablet (1,000 Units total) by mouth daily.      Multiple Vitamin (MULTIVITAMINS) Oral Cap Take 1 capsule by mouth every other day.      loratadine (CLARITIN) 10 MG Oral Tab Take 1 tablet (10 mg total) by  mouth daily as needed.        Past Medical History:   Diagnosis Date    Abnormal mammogram     Allergic rhinitis     Arrhythmia     Arthritis     Asthma     Atrial fibrillation (HCC)     Bronchitis     Elevated blood pressure     Extrinsic asthma, unspecified     Incisional hernia, incarcerated     Rosacea     Varicose veins     Varicose veins     Venous insufficiency     Ventral hernia with obstruction and without gangrene     Visual impairment     Vitamin D deficiency       Past Surgical History:   Procedure Laterality Date    BREAST BIOPSY Left 2012    COLONOSCOPY N/A 2018    Procedure: COLONOSCOPY;  Surgeon: Aneudy Cary MD;  Location: [facility] ENDOSCOPY    ECHO 2D, CARDIO (INTERNAL)  16    EF 55-60%    ELECTROCARDIOGRAM, COMPLETE  2013    Scanned to media tab - DOS 2013    HERNIA SURGERY  2017    Repair of incarcerated hernia by Dr Medley    REPAIR ROTATOR CUFF,ACUTE      right    VENTRAL HERNIA REPAIR  2017      Family History   Problem Relation Age of Onset    Pulmonary Disease Father         COPD    Hypertension Father     Other (Other) Father     Skin cancer Sister          of metastatic vulvar cancer    Hypertension Sister     Heart Attack Brother         MI    Heart Surgery Brother         CABG    Cataracts Sister       Social History     Socioeconomic History    Marital status:    Tobacco Use    Smoking status: Former     Packs/day: 0.40     Years: 33.00     Additional pack years: 0.00     Total pack years: 13.20     Types: Cigarettes     Quit date: 6/15/1974     Years since quittin.6     Passive exposure: Past    Smokeless tobacco: Never   Vaping Use    Vaping Use: Never used   Substance and Sexual Activity    Alcohol use: Yes     Alcohol/week: 2.0 standard drinks of alcohol     Types: 2 Cans of beer per week     Comment: once a wk    Drug use: No   Other Topics Concern    Caffeine Concern Yes     Comment: Coffee / Soda occasionally           REVIEW  OF SYSTEMS:     Denies nause, fever, chills  No calf pain  Denies chest pain or SOB      EXAM:   There were no vitals taken for this visit.  GENERAL: well developed, well nourished, in no apparent distress  EXTREMITIES:   1. Integument: Normal skin temperature and turgor. Toenails x10 are elongated, thickened and discolored with subungal derbi.     2. Vascular: Dorsalis pedis two out of four bilateral and posterior tibial pulses two out of   four bilateral, capillary refill normal.   3. Musculoskeletal: All muscle groups are graded 5 out of 5 in the foot and ankle.   4. Neurological: Normal sharp dull sensation; reflexes normal.             ASSESSMENT AND PLAN:   Diagnoses and all orders for this visit:    Onychomycosis    Pain in toes of both feet        Plan:       -Patient examined, chart history reviewed.  -Discussed importance of proper pedal hygiene, regular foot checks, and tight glucose control.  -Sharply debrided nails x10 with a sterile nail nipper achieving a 20% reduction in thickness and length, without incident.   -Ambulate with supportive shoes and inserts and avoid walking barefoot.  -Educated patient on acute signs of infection advised patient to seek immediate medical attention if symptoms arise.    RTC in 3 months       The patient indicates understanding of these issues and agrees to the plan.        Christy Fraser DPM

## 2024-02-18 ENCOUNTER — APPOINTMENT (OUTPATIENT)
Dept: URGENT CARE | Age: 69
End: 2024-02-18

## 2024-02-18 VITALS
SYSTOLIC BLOOD PRESSURE: 137 MMHG | DIASTOLIC BLOOD PRESSURE: 85 MMHG | OXYGEN SATURATION: 98 % | HEART RATE: 84 BPM | RESPIRATION RATE: 16 BRPM | TEMPERATURE: 98.5 F

## 2024-02-18 DIAGNOSIS — R09.89 RUNNY NOSE: Primary | ICD-10-CM

## 2024-02-18 LAB
AMB EXT COVID-19 RESULT: DETECTED
INTERNAL PROCEDURAL CONTROLS ACCEPTABLE: YES
SARS-COV+SARS-COV-2 AG RESP QL IA.RAPID: DETECTED
TEST LOT EXPIRATION DATE: ABNORMAL
TEST LOT NUMBER: ABNORMAL

## 2024-02-18 PROCEDURE — 87426 SARSCOV CORONAVIRUS AG IA: CPT | Performed by: EMERGENCY MEDICINE

## 2024-02-18 RX ORDER — POTASSIUM CHLORIDE 600 MG/1
TABLET, FILM COATED, EXTENDED RELEASE ORAL
COMMUNITY

## 2024-02-19 ENCOUNTER — TELEPHONE (OUTPATIENT)
Dept: INTERNAL MEDICINE CLINIC | Facility: CLINIC | Age: 69
End: 2024-02-19

## 2024-02-19 ENCOUNTER — VIRTUAL PHONE E/M (OUTPATIENT)
Dept: INTERNAL MEDICINE CLINIC | Facility: CLINIC | Age: 69
End: 2024-02-19

## 2024-02-19 DIAGNOSIS — U07.1 COVID: Primary | ICD-10-CM

## 2024-02-19 NOTE — PROGRESS NOTES
Virtual Telephone Check-In    Sonali Snider verbally consents to a Virtual/Telephone Check-In visit on 02/19/24.  Patient has been referred to the [facility] website at [email or web address] to review the yearly Consent to Treat document.    Patient understands and accepts financial responsibility for any deductible, co-insurance and/or co-pays associated with this service.    Duration of the service: 10 minutes      Summary of topics discussed:     Discussed with Sonali.  Reviewed symptoms.  Reviewed Tricia KUMARI's intake documentation.    Sonali is on Eliquis and diltiazem.  These drugs could be monitored so that she could take the Paxlovid but she wishes not to take the Paxlovid.  She is concerned about drug interactions which is reasonable.    She does not have any shortness of breath.  She has a dry cough.    She will take Mucinex.  Also told her she can take Tylenol or DayQuil or NyQuil.  She verbalized understanding.    She did not have the latest COVID-vaccine.    Symptoms started Saturday.  She tested positive Sunday.    She verbalizes agreement on all the above.  She will call tomorrow with an update.            Wellington [facility] MD

## 2024-02-19 NOTE — TELEPHONE ENCOUNTER
To AMY Burgos...    URI Triage:    Fever: Yes[]        No[]        Unknown[]   []Temperature:   []Chills  []Night sweats  []Body aches    Cough: Yes[]        No[]   []Productive cough  []Cough with exertion  []Dry cough    Respiratory Symptoms: Yes[]        No[]   []Wheezing  []Pain with deep breathing  []SOB with exertion  []SOB at rest  []Heavy breathing  []Chest discomfort with deep breathing or coughing    GI Symptoms: Yes []     No[]   []Diarrhea  []Nausea  []Vomiting  []Abdominal pain  []Lack of appetite    Other symptoms:  []Sore throat  []Sinus pressure  []Nasal drainage  []Nasal congestion  []Chest congestion  []Head congestion  []PND  []Facial pain   []Earache   []Conjunctivitis  []Headache  []Fatigue  []Weakness  []Loss of sense of smell   []Loss of sense of taste    []OTC Medications:     []Any recent travel  [] Any sick contacts    []Positive Covid exposure (<6 feet, >15 min, no mask worn)  If yes, date of exposure (last contact):     []Covid Test  Date/Result:      Vaccinated (covid): Yes []    No[]   Booster:  Yes[]   No[]     Symptom onset:        Patient was notified that this message will be routed to the physician to determine what their recommendation (s) would be. In the meantime, if they develop new or worsening symptoms, they were advised to call back or seek emergent evaluation at the ER. ER precautions reviewed.   Reviewed business hours and the after-hour service for the on-call physician, I.e, concerns/questions.

## 2024-02-19 NOTE — TELEPHONE ENCOUNTER
Lmtcb - per Dr. TOBIN if patient is covid positive ok to switch to telephone visit. If patient is negative okay to come in with mask- inquire about symptoms as well as fyi for Dr. TOBIN

## 2024-02-19 NOTE — TELEPHONE ENCOUNTER
URI Triage:    Fever: Yes[]  No[x]  Unknown[]  [x] Temperature: 98.0F today  [x] Chills  [] Night sweats   [x] Body aches (intermittent)    Cough: Yes[x]  No[]  [] Productive cough  [] Cough with exertion  [x] Dry cough (mostly dry, occasionally productive-clear)    Respiratory Symptoms: Yes[]  No[x]  [] Wheezing  [] Pain with deep breathing  [] SOB with exertion  [] SOB at rest  [] Heavy breathing  [] Chest discomfort with deep breathing or coughing    GI Symptoms: Yes [] No[x]  [] Diarrhea  [] Nausea  [] Vomiting  [] Upset stomach  [] Lack of appetite    Other symptoms:  [] Sore throat  [x] Sinus pressure  [x] Nasal drainage (not paying attention to clear, believe it's clear)  [x] Nasal congestion  [] Chest congestion  [] Head congestion  [x] PND  [] Facial pain   [] Earache   [] Conjunctivitis  [] Headache  [x] Fatigue  [] Weakness  [] Loss of sense of smell   [] Loss of sense of taste    [x]OTC Medications: Mucinex daily, Nyquil nightly, Tylenol ES 1 tablet prn     [] Any recent travel  [] Any sick contacts    [x] Positive Covid exposure (<6 feet, >15 min, no mask worn)  If yes, date of exposure (last contact):  tested positive for covid 2/15/24. Last contact 2/15/24.     [x] Covid Test  Date/Result: 2/18/24 positive     Vaccinated (covid): Yes  []   No []  Booster:  Yes  []  No []    Symptom onset: 2/17/24    T 99.5 Saturday. Today, 98.0F.    Patient was notified that this message will be routed to the physician to determine what their recommendation (s) would be. In the meantime, if they develop new or worsening symptoms, they were advised to call back or seek emergent evaluation at the ER. ER precautions reviewed.   Reviewed business hours and the after-hour service for the on-call physician, I.e, concerns/questions.      To Dr. TOBIN--

## 2024-02-19 NOTE — TELEPHONE ENCOUNTER
Spoke to patient, she was COVID positive yesterday 2/18/2024   Office visit has been changed to a telephone visit

## 2024-02-19 NOTE — TELEPHONE ENCOUNTER
Please call patient.  She has an appointment tomorrow for \"COVID\".  Please get some information about her current symptoms.  When did she have COVID?.  If she having any residual symptoms?.

## 2024-02-19 NOTE — TELEPHONE ENCOUNTER
Triage, can you call patient to ask her are COVID questions and then I can call her afterwards for treatment options.  Thank you.

## 2024-02-20 ENCOUNTER — TELEPHONE (OUTPATIENT)
Dept: INTERNAL MEDICINE CLINIC | Facility: CLINIC | Age: 69
End: 2024-02-20

## 2024-02-29 NOTE — TELEPHONE ENCOUNTER
Patient called  Requesting refill for:  Potassium Chloride, Qty 90  Patient uses [facility] [place] as pharmacy  Please fax RX to pharmacy,  Patient is getting low on medication  Tasked to RX

## 2024-03-01 RX ORDER — POTASSIUM CHLORIDE 600 MG/1
8 TABLET, FILM COATED, EXTENDED RELEASE ORAL DAILY
Qty: 90 TABLET | Refills: 3 | Status: SHIPPED | OUTPATIENT
Start: 2024-03-01

## 2024-03-01 RX ORDER — POTASSIUM CHLORIDE 600 MG/1
8 TABLET, FILM COATED, EXTENDED RELEASE ORAL DAILY
Qty: 90 TABLET | Refills: 3 | Status: SHIPPED
Start: 2024-03-01

## 2024-03-01 NOTE — TELEPHONE ENCOUNTER
Refill request is for a maintenance medication and has met the criteria specified in the Ambulatory Medication Refill Standing Order for eligibility, visits, laboratory, alerts and was sent to the requested pharmacy.    Requested Prescriptions     Signed Prescriptions Disp Refills    Potassium Chloride ER 8 MEQ Oral Tab CR 90 tablet 3     Sig: Take 1 tablet (8 mEq total) by mouth daily.     Authorizing Provider: ANTONIETA LEONE     Ordering User: SON GREWAL

## 2024-05-10 ENCOUNTER — TELEPHONE (OUTPATIENT)
Dept: INTERNAL MEDICINE CLINIC | Facility: CLINIC | Age: 69
End: 2024-05-10

## 2024-05-10 DIAGNOSIS — I10 ESSENTIAL HYPERTENSION: Primary | ICD-10-CM

## 2024-05-10 DIAGNOSIS — E55.9 VITAMIN D DEFICIENCY: ICD-10-CM

## 2024-05-14 ENCOUNTER — OFFICE VISIT (OUTPATIENT)
Dept: INTERNAL MEDICINE CLINIC | Facility: CLINIC | Age: 69
End: 2024-05-14

## 2024-05-14 VITALS
TEMPERATURE: 98 F | HEIGHT: 60 IN | BODY MASS INDEX: 49.32 KG/M2 | HEART RATE: 71 BPM | SYSTOLIC BLOOD PRESSURE: 118 MMHG | DIASTOLIC BLOOD PRESSURE: 68 MMHG | OXYGEN SATURATION: 99 % | WEIGHT: 251.19 LBS

## 2024-05-14 DIAGNOSIS — I10 ESSENTIAL HYPERTENSION: Primary | ICD-10-CM

## 2024-05-14 DIAGNOSIS — Z00.00 ROUTINE HEALTH MAINTENANCE: ICD-10-CM

## 2024-05-14 DIAGNOSIS — E55.9 VITAMIN D DEFICIENCY: ICD-10-CM

## 2024-05-14 DIAGNOSIS — I48.91 ATRIAL FIBRILLATION, UNSPECIFIED TYPE (HCC): ICD-10-CM

## 2024-05-14 DIAGNOSIS — E53.8 VITAMIN B 12 DEFICIENCY: ICD-10-CM

## 2024-05-14 DIAGNOSIS — I83.93 VARICOSE VEINS OF BOTH LOWER EXTREMITIES WITHOUT ULCER OR INFLAMMATION: ICD-10-CM

## 2024-05-14 PROCEDURE — 99214 OFFICE O/P EST MOD 30 MIN: CPT | Performed by: INTERNAL MEDICINE

## 2024-05-14 NOTE — PROGRESS NOTES
Sonali Snider is a 68 year old female.  HPI:     Chief Complaint   Patient presents with    Checkup     6 month follow up - allergies, covid in February      Sonali comes in the for her 6-month checkup.    Recently she started to have some soreness on the left side of her nose.  She wonders if it is on the \"inside\" of her nose.  Like a blockage but she is able to breathe out of that left side of the nostril.  She wonders if there is a problem outside.  There is just a minimal amount of erythema over the left nasal area compared to the right.  We talked about an antibiotic.  There does not seem to be a pimple or major cellulitis but there may be a little bit of an infection.  However she wishes to hold off on antibiotics which I think is reasonable since she  she indicates that it is getting better.    Sees cardiology for atrial fibrillation.  She is on Eliquis for stroke prophylaxis    She follows with her breast surgeon originally from [place] but now in another location. .  She is up-to-date with them.    She is aware of the availability of PCV 20 vaccine, Shingrix and I did tell her about RSV vaccine availability.    She is to get her labs next visit just once a year.  This seems reasonable.    Also knows she can see Dr. Campos for her venous insufficiency.  She has had prior procedures.  She had thought that he is not in the area but I did find him on the [facility] website.  She indicates that she has no acute problems in her lower extremities but she does suffer from lower extremity edema.  Current Outpatient Medications   Medication Sig Dispense Refill    Potassium Chloride ER 8 MEQ Oral Tab CR Take 1 tablet (8 mEq total) by mouth daily. 90 tablet 3    triamterene-hydroCHLOROthiazide 37.5-25 MG Oral Cap TAKE ONE CAPSULE BY MOUTH ONE TIME DAILY 90 capsule 3    albuterol 108 (90 Base) MCG/ACT Inhalation Aero Soln Inhale 2 puffs into the lungs every 4 (four) hours as needed for Wheezing. 8.5 g  11    CARTIA  MG Oral Capsule SR 24 Hr Take 1 capsule (120 mg total) by mouth daily.      acetaminophen 500 MG Oral Tab Take 1 tablet (500 mg total) by mouth every 6 (six) hours as needed for Pain.      Vitamin B-12 1000 MCG Oral Tab Take 1 tablet (1,000 mcg total) by mouth every other day.      ELIQUIS 5 MG Oral Tab Take 1 tablet (5 mg total) by mouth 2 (two) times daily.  0    Vitamin C (VITAMIN C) 500 MG Oral Tab Take 1 tablet (500 mg total) by mouth every other day.      Calcium Carb-Cholecalciferol 600-800 MG-UNIT Oral Tab Take 1 tablet by mouth every other day.        Vitamin D3 (VITAMIN D3) 1000 UNITS Oral Tab Take 1 tablet (1,000 Units total) by mouth daily.      Multiple Vitamin (MULTIVITAMINS) Oral Cap Take 1 capsule by mouth every other day.      loratadine (CLARITIN) 10 MG Oral Tab Take 1 tablet (10 mg total) by mouth daily as needed.      Potassium Chloride ER 8 MEQ Oral Tab CR Take 1 tablet (8 mEq total) by mouth daily. (Patient not taking: Reported on 2024) 90 tablet 3    Ibuprofen 200 MG Oral Cap Take 1 capsule (200 mg total) by mouth every 6 (six) hours as needed. (Patient not taking: Reported on 2024)        Past Medical History:    Abnormal mammogram    Allergic rhinitis    Arrhythmia    Arthritis    Asthma (HCC)    Atrial fibrillation (HCC)    Bronchitis    Elevated blood pressure    Extrinsic asthma, unspecified    Incisional hernia, incarcerated    Rosacea    Varicose veins    Varicose veins    Venous insufficiency    Ventral hernia with obstruction and without gangrene    Visual impairment    Vitamin D deficiency      Social History:  Social History     Socioeconomic History    Marital status:    Tobacco Use    Smoking status: Former     Current packs/day: 0.00     Average packs/day: 0.4 packs/day for 33.0 years (13.2 ttl pk-yrs)     Types: Cigarettes     Start date: 6/15/1941     Quit date: 6/15/1974     Years since quittin.9     Passive exposure: Past    Smokeless  tobacco: Never   Vaping Use    Vaping status: Never Used   Substance and Sexual Activity    Alcohol use: Yes     Alcohol/week: 2.0 standard drinks of alcohol     Types: 2 Cans of beer per week     Comment: once a wk    Drug use: No   Other Topics Concern    Caffeine Concern Yes     Comment: Coffee / Soda occasionally        REVIEW OF SYSTEMS:   GENERAL HEALTH:  feels well otherwise  RESPIRATORY:  Voices no shortness of breath with exertion or cough  CARDIOVASCULAR:  Voices no chest pain on exertion or shortness of breath  GI:   Voices no abdominal pain or changes of bowels   :Viices no urning or frequency of urination.  NEURO:  Voices no  headaches or dizziness    EXAM:   /68   Pulse 71   Temp 97.6 °F (36.4 °C)   Ht 5' (1.524 m)   Wt 251 lb 3.2 oz (113.9 kg)   SpO2 99%   BMI 49.06 kg/m²     GENERAL:  well developed, well nourished, in no apparent distress  SKIN:  no rashes ,   HEENT: atraumatic.    Pharynx normal without exudate.  Maybe a little bit of erythema over the left side of her nose but no obvious pimple or early abscesses or anything similar.  I also from limited view do not see anything inside the nose but I told her it may be wise to see ENT and I did give her contact information to Dr. Aaron  EYES:  PERRL. Sclera anicteric.  NECK:  Supple,  no adenopathy,  thyroid normal  LUNGS:  clear to auscultation.  Effort normal  CARDIO: Irregularly irregular rhythm consistent with her atrial fibrillation without murmur.   S1 and S2 normal  GI:  good BS's,  no masses,   HSM or tenderness  EXTREMITIES : No changes from venous insufficiency.    ASSESSMENT AND PLAN:     1. Essential hypertension  Pressure under good control    2. Vitamin D deficiency  Check vitamin D level next visit    3. Atrial fibrillation, unspecified type (HCC)  On Eliquis for stroke prophylaxis    4. Varicose veins of both lower extremities without ulcer or inflammation  History of varicose veins.  She has seen Dr. Mon.  She  knows she can see him again.  She does not have any skin breakdown.    5. Routine health maintenance  Discussed availability of the above vaccines.    6. Vitamin B 12 deficiency  Update vitamin B12 level at next visit  - Vitamin B12 with Reflex to MMA; Future    This visit was 30 minutes.  I spent 10 minutes before visit preparing and reviewing old records.  Greater than 50% of the visit was engaged in counseling and review of past data.    Follow-up in 6 months     The patient indicates understanding of these issues and agrees to the plan.    Wellington Mcclellan MD  5/14/2024  1:26 PM

## 2024-05-21 ENCOUNTER — OFFICE VISIT (OUTPATIENT)
Dept: PODIATRY CLINIC | Facility: CLINIC | Age: 69
End: 2024-05-21

## 2024-05-21 DIAGNOSIS — M79.675 PAIN IN TOES OF BOTH FEET: ICD-10-CM

## 2024-05-21 DIAGNOSIS — B35.1 ONYCHOMYCOSIS: Primary | ICD-10-CM

## 2024-05-21 DIAGNOSIS — M79.674 PAIN IN TOES OF BOTH FEET: ICD-10-CM

## 2024-05-21 PROCEDURE — 99213 OFFICE O/P EST LOW 20 MIN: CPT | Performed by: STUDENT IN AN ORGANIZED HEALTH CARE EDUCATION/TRAINING PROGRAM

## 2024-05-21 NOTE — PROGRESS NOTES
[facility]iatry  Progress Note      Sonali Snider is a 68 year old female.   Chief Complaint   Patient presents with    Toenail Care     Routine nail trim. Patient denies any pain at this time.              HPI:     Patient is a pleasant 68-year-old female who presents to clinic today for bilateral foot evaluation.  Denies any pedal injuries or trauma.    Allergies: Amoxicillin, Bioflex, Doxycycline, Doxycycline hyclate, Minocycline, Orphenadrine, Ra glucosamine-chondroitin, Orphenadrine citrate, Shellfish, Sulfanilamide, Cefaclor, Clarithromycin, and Metronidazole    Current Outpatient Medications   Medication Sig Dispense Refill    Potassium Chloride ER 8 MEQ Oral Tab CR Take 1 tablet (8 mEq total) by mouth daily. 90 tablet 3    Potassium Chloride ER 8 MEQ Oral Tab CR Take 1 tablet (8 mEq total) by mouth daily. 90 tablet 3    triamterene-hydroCHLOROthiazide 37.5-25 MG Oral Cap TAKE ONE CAPSULE BY MOUTH ONE TIME DAILY 90 capsule 3    albuterol 108 (90 Base) MCG/ACT Inhalation Aero Soln Inhale 2 puffs into the lungs every 4 (four) hours as needed for Wheezing. 8.5 g 11    Ibuprofen 200 MG Oral Cap Take 1 capsule (200 mg total) by mouth every 6 (six) hours as needed.      CARTIA  MG Oral Capsule SR 24 Hr Take 1 capsule (120 mg total) by mouth daily.      acetaminophen 500 MG Oral Tab Take 1 tablet (500 mg total) by mouth every 6 (six) hours as needed for Pain.      Vitamin B-12 1000 MCG Oral Tab Take 1 tablet (1,000 mcg total) by mouth every other day.      ELIQUIS 5 MG Oral Tab Take 1 tablet (5 mg total) by mouth 2 (two) times daily.  0    Vitamin C (VITAMIN C) 500 MG Oral Tab Take 1 tablet (500 mg total) by mouth every other day.      Calcium Carb-Cholecalciferol 600-800 MG-UNIT Oral Tab Take 1 tablet by mouth every other day.        Vitamin D3 (VITAMIN D3) 1000 UNITS Oral Tab Take 1 tablet (1,000 Units total) by mouth daily.      Multiple Vitamin (MULTIVITAMINS) Oral Cap Take 1 capsule by mouth  every other day.      loratadine (CLARITIN) 10 MG Oral Tab Take 1 tablet (10 mg total) by mouth daily as needed.        Past Medical History:    Abnormal mammogram    Allergic rhinitis    Arrhythmia    Arthritis    Asthma (HCC)    Atrial fibrillation (HCC)    Bronchitis    Elevated blood pressure    Extrinsic asthma, unspecified    Incisional hernia, incarcerated    Rosacea    Varicose veins    Varicose veins    Venous insufficiency    Ventral hernia with obstruction and without gangrene    Visual impairment    Vitamin D deficiency      Past Surgical History:   Procedure Laterality Date    Breast biopsy Left     Colonoscopy N/A 2018    Procedure: COLONOSCOPY;  Surgeon: Aneudy Cary MD;  [facility]    Echo 2d, cardio (internal)  16    EF 55-60%    Electrocardiogram, complete  2013    Scanned to media tab - DOS 2013    Hernia surgery  2017    Repair of incarcerated hernia by Dr Medley    Repair rotator cuff,acute      right    Ventral hernia repair  2017      Family History   Problem Relation Age of Onset    Pulmonary Disease Father         COPD    Hypertension Father     Other (Other) Father     Skin cancer Sister          of metastatic vulvar cancer    Hypertension Sister     Heart Attack Brother         MI    Heart Surgery Brother         CABG    Cataracts Sister       Social History     Socioeconomic History    Marital status:    Tobacco Use    Smoking status: Former     Current packs/day: 0.00     Average packs/day: 0.4 packs/day for 33.0 years (13.2 ttl pk-yrs)     Types: Cigarettes     Start date: 6/15/1941     Quit date: 6/15/1974     Years since quittin.9     Passive exposure: Past    Smokeless tobacco: Never   Vaping Use    Vaping status: Never Used   Substance and Sexual Activity    Alcohol use: Yes     Alcohol/week: 2.0 standard drinks of alcohol     Types: 2 Cans of beer per week     Comment: once a wk    Drug use: No   Other  Topics Concern    Caffeine Concern Yes     Comment: Coffee / Soda occasionally           REVIEW OF SYSTEMS:     Denies nause, fever, chills  No calf pain  Denies chest pain or SOB      EXAM:   There were no vitals taken for this visit.  GENERAL: well developed, well nourished, in no apparent distress  EXTREMITIES:   1. Integument: Normal skin temperature and turgor. Toenails x10 are elongated, thickened and discolored with subungal derbi.     2. Vascular: Dorsalis pedis two out of four bilateral and posterior tibial pulses two out of   four bilateral, capillary refill normal.   3. Musculoskeletal: All muscle groups are graded 5 out of 5 in the foot and ankle.   4. Neurological: Normal sharp dull sensation; reflexes normal.             ASSESSMENT AND PLAN:   Diagnoses and all orders for this visit:    Onychomycosis    Pain in toes of both feet          Plan:       -Patient examined, chart history reviewed.  -Discussed importance of proper pedal hygiene, regular foot checks, and tight glucose control.  -Sharply debrided nails x10 with a sterile nail nipper achieving a 20% reduction in thickness and length, without incident.   -Ambulate with supportive shoes and inserts and avoid walking barefoot.  -Educated patient on acute signs of infection advised patient to seek immediate medical attention if symptoms arise.    RTC in 3 months       The patient indicates understanding of these issues and agrees to the plan.        Christy Fraser DPM

## 2024-06-17 RX ORDER — ALBUTEROL SULFATE 90 UG/1
2 AEROSOL, METERED RESPIRATORY (INHALATION) EVERY 4 HOURS PRN
Qty: 1 EACH | Refills: 11 | Status: SHIPPED | OUTPATIENT
Start: 2024-06-17

## 2024-06-17 NOTE — TELEPHONE ENCOUNTER
Refill request is for a maintenance medication and has met the criteria specified in the Ambulatory Medication Refill Standing Order for eligibility, visits, laboratory, alerts and was sent to the requested pharmacy.    Requested Prescriptions     Signed Prescriptions Disp Refills    albuterol 108 (90 Base) MCG/ACT Inhalation Aero Soln 1 each 11     Sig: Inhale 2 puffs into the lungs every 4 (four) hours as needed for Wheezing.     Authorizing Provider: ANTONIETA LEONE     Ordering User: CESAR MAYA

## 2024-06-25 ENCOUNTER — OFFICE VISIT (OUTPATIENT)
Dept: INTERNAL MEDICINE CLINIC | Facility: CLINIC | Age: 69
End: 2024-06-25

## 2024-06-25 VITALS
DIASTOLIC BLOOD PRESSURE: 70 MMHG | WEIGHT: 254.38 LBS | TEMPERATURE: 98 F | BODY MASS INDEX: 49.94 KG/M2 | HEIGHT: 60 IN | SYSTOLIC BLOOD PRESSURE: 130 MMHG | OXYGEN SATURATION: 99 % | HEART RATE: 69 BPM

## 2024-06-25 DIAGNOSIS — J34.0 CELLULITIS OF EXTERNAL NOSE: ICD-10-CM

## 2024-06-25 DIAGNOSIS — I10 ESSENTIAL HYPERTENSION: ICD-10-CM

## 2024-06-25 DIAGNOSIS — I48.91 ATRIAL FIBRILLATION, UNSPECIFIED TYPE (HCC): ICD-10-CM

## 2024-06-25 DIAGNOSIS — R06.89 DIFFICULTY BREATHING: ICD-10-CM

## 2024-06-25 DIAGNOSIS — R09.81 NASAL CONGESTION: ICD-10-CM

## 2024-06-25 DIAGNOSIS — R21 RASH: Primary | ICD-10-CM

## 2024-06-25 PROCEDURE — 99214 OFFICE O/P EST MOD 30 MIN: CPT | Performed by: INTERNAL MEDICINE

## 2024-06-25 RX ORDER — PREDNISONE 20 MG/1
TABLET ORAL
Qty: 8 TABLET | Refills: 0 | Status: SHIPPED | OUTPATIENT
Start: 2024-06-25

## 2024-06-25 RX ORDER — CLINDAMYCIN HYDROCHLORIDE 300 MG/1
300 CAPSULE ORAL 3 TIMES DAILY
Qty: 15 CAPSULE | Refills: 0 | Status: SHIPPED | OUTPATIENT
Start: 2024-06-25

## 2024-06-25 NOTE — PROGRESS NOTES
Sonali Snider is a 68 year old female.  HPI:     Chief Complaint   Patient presents with    Derm Problem     Face feels puffy on left side bright red bump on left lower chin ,also stuffed up on left side       Sonali presents with a breakout on her face.    She has had some left-sided nasal stuffiness for about a month.  We have spoken about this in the past.  She seems to feel like there is some obstruction breathing through the left side of her nose.  I thought it might be wise for her to see ENT and I did give her local ENT physicians that she can call.  Think it would be worthwhile to see them to make sure there is nothing polyp wise or growth wise within the nasal cavity.    In addition she has some erythema over the left side of her nose of about 1 cm an area that looks to me like a superficial skin infection.  Unfortunately she has allergies to most antibiotics but can tolerate clindamycin.  We did talk about the possibility of C. difficile clindamycin but again on review of all her medications clindamycin seems to be the one that can be given.  Will just give 5 days.    During the office visit she did not feel well with nasal stuffiness.  She thought she may have some difficulty breathing but no coughing or wheezing.  She got better during the office visit.  She feels like her left sinus is congested although she is does not have any nasal discharge.  No postnasal drip.  No chest pain.    In addition to the clindamycin I am going to give her a short course of prednisone for treatment of allergic rhinitis that is interfering with her breathing.  She verbalized agreement to both of this approach.    In addition she has a 1 cm area left lower chin area that looks like some sort of superficial rash.  Does not look like an abscess or pimple.  Is not blistering.  I asked her to see dermatology for this.  She also has an area on her left lip however no blistering.  No suggestion of shingles.  I told her that  the dermatologist can check these areas also.    Indicated that she may wait to see how she feels after the clindamycin and prednisone.    She has a history of hypertension.  Blood pressure under control.  She also has a history of atrial fibrillation.  On Eliquis for stroke prophylaxis.    I told her to give a call Friday with how she is doing.    She was very stable leaving the office.  No difficulty breathing.  Lungs clear without wheezing.  Current Outpatient Medications   Medication Sig Dispense Refill    clindamycin 300 MG Oral Cap Take 1 capsule (300 mg total) by mouth 3 (three) times daily. 15 capsule 0    predniSONE 20 MG Oral Tab Take 2 tablets every day x4 days for allergies 8 tablet 0    albuterol 108 (90 Base) MCG/ACT Inhalation Aero Soln Inhale 2 puffs into the lungs every 4 (four) hours as needed for Wheezing. 1 each 11    Potassium Chloride ER 8 MEQ Oral Tab CR Take 1 tablet (8 mEq total) by mouth daily. 90 tablet 3    triamterene-hydroCHLOROthiazide 37.5-25 MG Oral Cap TAKE ONE CAPSULE BY MOUTH ONE TIME DAILY 90 capsule 3    Ibuprofen 200 MG Oral Cap Take 1 capsule (200 mg total) by mouth every 6 (six) hours as needed.      CARTIA  MG Oral Capsule SR 24 Hr Take 1 capsule (120 mg total) by mouth daily.      acetaminophen 500 MG Oral Tab Take 1 tablet (500 mg total) by mouth every 6 (six) hours as needed for Pain.      Vitamin B-12 1000 MCG Oral Tab Take 1 tablet (1,000 mcg total) by mouth every other day.      ELIQUIS 5 MG Oral Tab Take 1 tablet (5 mg total) by mouth 2 (two) times daily.  0    Vitamin C (VITAMIN C) 500 MG Oral Tab Take 1 tablet (500 mg total) by mouth every other day.      Calcium Carb-Cholecalciferol 600-800 MG-UNIT Oral Tab Take 1 tablet by mouth every other day.        Vitamin D3 (VITAMIN D3) 1000 UNITS Oral Tab Take 1 tablet (1,000 Units total) by mouth daily.      Multiple Vitamin (MULTIVITAMINS) Oral Cap Take 1 capsule by mouth every other day.      loratadine (CLARITIN)  10 MG Oral Tab Take 1 tablet (10 mg total) by mouth daily as needed.      Potassium Chloride ER 8 MEQ Oral Tab CR Take 1 tablet (8 mEq total) by mouth daily. (Patient not taking: Reported on 2024) 90 tablet 3      Past Medical History:    Abnormal mammogram    Allergic rhinitis    Arrhythmia    Arthritis    Asthma (HCC)    Atrial fibrillation (HCC)    Bronchitis    Elevated blood pressure    Extrinsic asthma, unspecified    Incisional hernia, incarcerated    Rosacea    Varicose veins    Varicose veins    Venous insufficiency    Ventral hernia with obstruction and without gangrene    Visual impairment    Vitamin D deficiency      Social History:  Social History     Socioeconomic History    Marital status:    Tobacco Use    Smoking status: Former     Current packs/day: 0.00     Types: Cigarettes     Quit date: 6/15/1974     Years since quittin.0     Passive exposure: Past    Smokeless tobacco: Never   Vaping Use    Vaping status: Never Used   Substance and Sexual Activity    Alcohol use: Yes     Alcohol/week: 2.0 standard drinks of alcohol     Types: 2 Cans of beer per week     Comment: once a wk    Drug use: No   Other Topics Concern    Caffeine Concern Yes     Comment: Coffee / Soda occasionally        REVIEW OF SYSTEMS:   GENERAL HEALTH:  feels well otherwise  RESPIRATORY:  Voices no shortness of breath with exertion or cough.  Her symptoms of breathing difficulty seems to be nasal in nature.  CARDIOVASCULAR:  Voices no chest pain on exertion or shortness of breath  GI:   Voices no abdominal pain or changes of bowels   :Viices no urning or frequency of urination.  NEURO:  Voices no  headaches or dizziness    EXAM:   /70 (BP Location: Right arm, Patient Position: Sitting, Cuff Size: large)   Pulse 69   Temp 97.9 °F (36.6 °C) (Oral)   Ht 5' (1.524 m)   Wt 254 lb 6.4 oz (115.4 kg)   SpO2 99%   BMI 49.68 kg/m²     GENERAL:  well developed, well nourished, in no apparent distress  SKIN:   see above  HEENT: atraumatic.   Pharynx normal without exudate.  EYES:  PERRL. Sclera anicteric.  NECK:  Supple,  no adenopathy,   LUNGS:  clear to auscultation.  Effort normal  CARDIO:  RRR without murmur.   S1 and S2 normal  GI:  good BS's,  no masses,   HSM or tenderness  EXTREMITIES : no cyanosis, clubbing or edema    ASSESSMENT AND PLAN:     1. Rash  Sonali has a rash.  Mostly left lower chin area.  I am not quite sure what this is.  It does not look like an abscess.  Does not look like a pimple.  No blistering.  She can see dermatology for this.  There is also a slight area on her upper lip.  However no blistering.    2. Nasal congestion  Nasal congestion that she feels there is airflow obstruction out of the left nostril.  I told her she can see ENT for this.    3. Difficulty breathing  I believe the difficulty breathing is from nasal congestion.  She seems to think so also.  I do not hear any wheezing in her lungs.    4. Cellulitis of external nose  I think she has a mild cellulitis of the left side of her nose.  Unfortunately she is allergic to all the common antibiotics we would use for this.  Will give 5 days of clindamycin.    5. Essential hypertension  Blood pressure under good control    6. Atrial fibrillation, unspecified type (HCC)  Stable.  On Eliquis for stroke prophylaxis    This visit was 30 minutes.  I spent 10 minutes before visit preparing and reviewing old records.  Greater than 50% of the visit was engaged in counseling and review of past data.    I asked her to let us know how she is doing by Friday.    The patient indicates understanding of these issues and agrees to the plan.    Wellington Mcclellan MD  6/25/2024  4:22 PM

## 2024-06-28 ENCOUNTER — TELEPHONE (OUTPATIENT)
Dept: INTERNAL MEDICINE CLINIC | Facility: CLINIC | Age: 69
End: 2024-06-28

## 2024-06-28 DIAGNOSIS — I10 ESSENTIAL HYPERTENSION: Primary | ICD-10-CM

## 2024-06-28 NOTE — TELEPHONE ENCOUNTER
Patient is calling with a condition update.  Patient states she is improving gradually.    Patient is also call to asking if she can add to her existing lab orders.      Patient states she would like to have her magnesium level checked.

## 2024-06-28 NOTE — TELEPHONE ENCOUNTER
We can let Sonali know I appreciate her follow-up.  I am glad she is improving.    As we discussed during last office visit she can always see dermatology for any rashes on her face.    Also follow-up with ENT as she does have that sensation that something is blocking her left nasal airflow.  I gave her contact information to local ENT physicians last office visit

## 2024-06-28 NOTE — TELEPHONE ENCOUNTER
I attempted to reach patient.  Left detailed message on voicemail, ok per HIPAA, relaying Dr Fernandez message.  Also relayed that Dr Mcclellan did add a magnesium level to her existing labs.  Relayed to call back with any additional questions or concerns.

## 2024-07-02 NOTE — PROGRESS NOTES
NEW PATIENT PROGRESS NOTE  OTOLOGY/OTOLARYNGOLOGY    REF MD:  No referring provider defined for this encounter.    PCP: Wellington Mcclellan MD    CHIEF COMPLAINT:    Chief Complaint   Patient presents with    Sinus Problem     Chronic congestion left side        HISTORY OF PRESENT ILLNESS: Sonali Snider is a 69 year old female who presents for evaluation of nasal congestion. Patient reports left-sided nasal congestion for about a month. She mentions the congestion is not difficulty breathing through her nose, but it feels like air is passing through something when she breathes. Her nose feels full. Patient is currently on clindamycin for a superficial skin infection on her face that was recently evaluated by her dermatologist on 6/25/2024. She was referred here for further evaluation of nasal symptoms. Denies changes in smell. Denies itchy watery eyes, sneezing. Denies sinus infections.       PAST MEDICAL HISTORY:    Past Medical History:    Abnormal mammogram    Allergic rhinitis    Arrhythmia    Arthritis    Asthma (HCC)    Atrial fibrillation (HCC)    Bronchitis    Elevated blood pressure    Extrinsic asthma, unspecified    Incisional hernia, incarcerated    Rosacea    Varicose veins    Varicose veins    Venous insufficiency    Ventral hernia with obstruction and without gangrene    Visual impairment    Vitamin D deficiency       PAST SURGICAL HISTORY:    Past Surgical History:   Procedure Laterality Date    Breast biopsy Left 2012    Colonoscopy N/A 8/7/2018    Procedure: COLONOSCOPY;  Surgeon: Aneudy Cary MD;  Location: [facility] ENDOSCOPY    Echo 2d, cardio (internal)  8/19/16    EF 55-60%    Electrocardiogram, complete  03-    Scanned to media tab - DOS 03-    Hernia surgery  09/11/2017    Repair of incarcerated hernia by Dr Medley    Repair rotator cuff,acute      right    Ventral hernia repair  09/2017       Current Outpatient Medications on File Prior to Visit   Medication Sig Dispense  Refill    albuterol 108 (90 Base) MCG/ACT Inhalation Aero Soln Inhale 2 puffs into the lungs every 4 (four) hours as needed for Wheezing. 1 each 11    Potassium Chloride ER 8 MEQ Oral Tab CR Take 1 tablet (8 mEq total) by mouth daily. 90 tablet 3    triamterene-hydroCHLOROthiazide 37.5-25 MG Oral Cap TAKE ONE CAPSULE BY MOUTH ONE TIME DAILY 90 capsule 3    Ibuprofen 200 MG Oral Cap Take 1 capsule (200 mg total) by mouth every 6 (six) hours as needed.      CARTIA  MG Oral Capsule SR 24 Hr Take 1 capsule (120 mg total) by mouth daily.      acetaminophen 500 MG Oral Tab Take 1 tablet (500 mg total) by mouth every 6 (six) hours as needed for Pain.      Vitamin B-12 1000 MCG Oral Tab Take 1 tablet (1,000 mcg total) by mouth every other day.      ELIQUIS 5 MG Oral Tab Take 1 tablet (5 mg total) by mouth 2 (two) times daily.  0    Vitamin C (VITAMIN C) 500 MG Oral Tab Take 1 tablet (500 mg total) by mouth every other day.      Calcium Carb-Cholecalciferol 600-800 MG-UNIT Oral Tab Take 1 tablet by mouth every other day.        Vitamin D3 (VITAMIN D3) 1000 UNITS Oral Tab Take 1 tablet (1,000 Units total) by mouth daily.      Multiple Vitamin (MULTIVITAMINS) Oral Cap Take 1 capsule by mouth every other day.      loratadine (CLARITIN) 10 MG Oral Tab Take 1 tablet (10 mg total) by mouth daily as needed.      clindamycin 300 MG Oral Cap Take 1 capsule (300 mg total) by mouth 3 (three) times daily. 15 capsule 0    predniSONE 20 MG Oral Tab Take 2 tablets every day x4 days for allergies 8 tablet 0    Potassium Chloride ER 8 MEQ Oral Tab CR Take 1 tablet (8 mEq total) by mouth daily. 90 tablet 3     No current facility-administered medications on file prior to visit.       Allergies:   Allergies   Allergen Reactions    Amoxicillin RASH    Bioflex SWELLING and JITTERY     Slurred speech, drooling    Doxycycline SHORTNESS OF BREATH     Other reaction(s): Trouble Breathing    Doxycycline Hyclate PAIN and SHORTNESS OF BREATH      Other reaction(s): vomiting & joint pain    Minocycline SHORTNESS OF BREATH     Other reaction(s): Trouble Breathing    Orphenadrine OTHER (SEE COMMENTS)     Other reaction(s): shakey    Ra Glucosamine-Chondroitin OTHER (SEE COMMENTS)     bioflex brand-tongue swelling, difficulty breathing  bioflex brand-tongue swelling, difficulty breathing      Cetylpyridinium-Benzocaine OTHER (SEE COMMENTS)    Orphenadrine Citrate OTHER (SEE COMMENTS)     Other reaction(s): shakey    Shellfish UNKNOWN    Sulfanilamide      Other reaction(s): Trouble Breathing    Cefaclor RASH     Other reaction(s): Rash    Clarithromycin RASH     Other reaction(s): Rash    Metronidazole RASH and ITCHING     Other reaction(s): Rash       SOCIAL HISTORY:    Social History     Tobacco Use    Smoking status: Former     Current packs/day: 0.00     Types: Cigarettes     Quit date: 6/15/1974     Years since quittin.0     Passive exposure: Past    Smokeless tobacco: Never   Substance Use Topics    Alcohol use: Yes     Alcohol/week: 2.0 standard drinks of alcohol     Types: 2 Cans of beer per week     Comment: once a wk       FAMILY HISTORY: Denies known family history of hearing loss, tinnitus, vertigo, or migraine.  Denies known family history of head and neck cancer, thyroid cancer, bleeding disorders.     REVIEW OF SYSTEMS:   Positives are in bold  Neuro: Headache, facial weakness, facial numbness, neck pain, vertigo  ENT: Hearing change, tinnitus, otorrhea, otalgia, aural fullness, ear pressure, vertigo, imbalance  Sinus pressure, rhinorrhea, congestion, facial pain, jaw pain, dysphagia, odynophagia, sore throat, voice changes, shortness of breath    EXAMINATION:  I washed my hands with an alcohol-based hand gel prior to examination  Constitutional:   --Vitals: Height 5' (1.524 m), weight 254 lb (115.2 kg).  General: no apparent distress, well-developed, conversant  Psych: affect pleasant and appropriate for age, alert and oriented  Respiratory:  No stridor, stertor or increased work of breathing  ENT:  --Face: erythema on the left chin, jaw, lower face  --Nose: no external nasal deformity, anterior rhinoscopy: Septum midline, no inferior turbinate hypertrophy, mucosa healthy, no rhinorrhea  --Neck: No palpable cervical lymphadenopathy, no thyromegaly, no masses or lesions over the bilateral submandibular or parotid glands    Nasal endoscopy (date 7/3/2024)  Verbal consent obtained, patient was correctly identified  Topical anesthesia with aerosolized lidocaine and neosynepherine spray in the bilateral nares  Findings:   Right: No mucous throughout. Normal mucosa. Inferior turbinate is non-hypertrophic. Middle meatus is without polyps, purulence, masses. Middle turbinate is well formed and normal appearing. Sphenoethmoid recess is without polyps, purulence, masses.   Left: No mucous throughout. Normal mucosa. Inferior turbinate is non-hypertrophic. Middle meatus is without polyps, purulence, masses. Middle turbinate is well formed and normal appearing. Sphenoethmoid recess is without polyps, purulence, masses.   Septum: with bilateral deviation, right nasal passageway is more narrow than left  Nasopharynx: No masses, bilateral eustachian tubes are patent. The bilateral fossae of Rosenmueller are clear.    ASSESSMENT/PLAN:  Sonali Snider is a 69 year old female with     ICD-10-CM   1. Nasal congestion  R09.81        IMPRESSION:  Sensation of left nasal fullness, congestion. But no difficulty breathing through the nose.     PLAN:  -Nasal symptom does not seem related to facial skin infection  -No obvious cause of symptoms (fullness). Normal nasal endoscopy.   -Consider seeing Dr. Vela for second opinion.  -Reassurance that nasal exam is normal provided.     Situation reviewed with the patient in detail.    Masood Venegas MD  Otology/Otolaryngology  [address]  [place] [address]  Phone  [phone]  Fax [phone]

## 2024-07-03 ENCOUNTER — OFFICE VISIT (OUTPATIENT)
Dept: OTOLARYNGOLOGY | Facility: CLINIC | Age: 69
End: 2024-07-03
Payer: MEDICARE

## 2024-07-03 VITALS — BODY MASS INDEX: 49.87 KG/M2 | WEIGHT: 254 LBS | HEIGHT: 60 IN

## 2024-07-03 DIAGNOSIS — R09.81 NASAL CONGESTION: Primary | ICD-10-CM

## 2024-08-26 RX ORDER — TRIAMTERENE AND HYDROCHLOROTHIAZIDE 37.5; 25 MG/1; MG/1
1 CAPSULE ORAL DAILY
Qty: 90 CAPSULE | Refills: 3 | Status: SHIPPED | OUTPATIENT
Start: 2024-08-26

## 2024-08-26 NOTE — TELEPHONE ENCOUNTER
Refill request is for a maintenance medication and has met the criteria specified in the Ambulatory Medication Refill Standing Order for eligibility, visits, laboratory, alerts and was sent to the requested pharmacy.    Requested Prescriptions     Signed Prescriptions Disp Refills    triamterene-hydroCHLOROthiazide 37.5-25 MG Oral Cap 90 capsule 3     Sig: Take 1 capsule by mouth daily.     Authorizing Provider: ANTONIETA LEONE     Ordering User: CESAR MAYA

## 2024-08-27 ENCOUNTER — OFFICE VISIT (OUTPATIENT)
Dept: PODIATRY CLINIC | Facility: CLINIC | Age: 69
End: 2024-08-27
Payer: MEDICARE

## 2024-08-27 DIAGNOSIS — M79.674 PAIN IN TOES OF BOTH FEET: Primary | ICD-10-CM

## 2024-08-27 DIAGNOSIS — M79.675 PAIN IN TOES OF BOTH FEET: Primary | ICD-10-CM

## 2024-08-27 PROCEDURE — 99213 OFFICE O/P EST LOW 20 MIN: CPT | Performed by: STUDENT IN AN ORGANIZED HEALTH CARE EDUCATION/TRAINING PROGRAM

## 2024-08-27 RX ORDER — AZELAIC ACID 0.15 G/G
GEL TOPICAL
COMMUNITY
Start: 2024-07-16

## 2024-08-27 RX ORDER — MUPIROCIN 20 MG/G
OINTMENT TOPICAL
COMMUNITY
Start: 2024-08-05

## 2024-08-27 RX ORDER — CLINDAMYCIN AND BENZOYL PEROXIDE 10; 50 MG/G; MG/G
1 GEL TOPICAL DAILY PRN
COMMUNITY
Start: 2024-07-18

## 2024-08-27 RX ORDER — TRIAMCINOLONE ACETONIDE 1 MG/G
CREAM TOPICAL 2 TIMES DAILY
COMMUNITY

## 2024-08-27 NOTE — PROGRESS NOTES
[facility] Podiatry  Progress Note      Sonali Snider is a 69 year old female.   Chief Complaint   Patient presents with    Toenail Care     3MO - Pt here for nail trim and foot check. Denies any pain              HPI:     Patient is a pleasant 69-year-old female who presents to clinic today for bilateral foot evaluation.  Denies any pedal injuries or trauma.    Allergies: Amoxicillin, Bioflex, Doxycycline, Doxycycline hyclate, Minocycline, Orphenadrine, Ra glucosamine-chondroitin, Cetylpyridinium-benzocaine, Orphenadrine citrate, Shellfish, Sulfanilamide, Cefaclor, Clarithromycin, and Metronidazole    Current Outpatient Medications   Medication Sig Dispense Refill    Azelaic Acid 15 % External Gel       Clindamycin Phos-Benzoyl Perox 1-5 % External Gel Apply 1 Application topically daily as needed.      mupirocin 2 % External Ointment mupirocin 2 % topical ointment, [RxNorm: 580010]      triamcinolone 0.1 % External Cream Apply topically 2 (two) times daily.      triamterene-hydroCHLOROthiazide 37.5-25 MG Oral Cap Take 1 capsule by mouth daily. 90 capsule 3    clindamycin 300 MG Oral Cap Take 1 capsule (300 mg total) by mouth 3 (three) times daily. 15 capsule 0    predniSONE 20 MG Oral Tab Take 2 tablets every day x4 days for allergies 8 tablet 0    albuterol 108 (90 Base) MCG/ACT Inhalation Aero Soln Inhale 2 puffs into the lungs every 4 (four) hours as needed for Wheezing. 1 each 11    Potassium Chloride ER 8 MEQ Oral Tab CR Take 1 tablet (8 mEq total) by mouth daily. 90 tablet 3    Potassium Chloride ER 8 MEQ Oral Tab CR Take 1 tablet (8 mEq total) by mouth daily. 90 tablet 3    Ibuprofen 200 MG Oral Cap Take 1 capsule (200 mg total) by mouth every 6 (six) hours as needed.      CARTIA  MG Oral Capsule SR 24 Hr Take 1 capsule (120 mg total) by mouth daily.      acetaminophen 500 MG Oral Tab Take 1 tablet (500 mg total) by mouth every 6 (six) hours as needed for Pain.      Vitamin B-12 1000 MCG  Oral Tab Take 1 tablet (1,000 mcg total) by mouth every other day.      ELIQUIS 5 MG Oral Tab Take 1 tablet (5 mg total) by mouth 2 (two) times daily.  0    Vitamin C (VITAMIN C) 500 MG Oral Tab Take 1 tablet (500 mg total) by mouth every other day.      Calcium Carb-Cholecalciferol 600-800 MG-UNIT Oral Tab Take 1 tablet by mouth every other day.        Vitamin D3 (VITAMIN D3) 1000 UNITS Oral Tab Take 1 tablet (1,000 Units total) by mouth daily.      Multiple Vitamin (MULTIVITAMINS) Oral Cap Take 1 capsule by mouth every other day.      loratadine (CLARITIN) 10 MG Oral Tab Take 1 tablet (10 mg total) by mouth daily as needed.        Past Medical History:    Abnormal mammogram    Allergic rhinitis    Arrhythmia    Arthritis    Asthma (HCC)    Atrial fibrillation (HCC)    Bronchitis    Elevated blood pressure    Extrinsic asthma, unspecified    Incisional hernia, incarcerated    Rosacea    Varicose veins    Varicose veins    Venous insufficiency    Ventral hernia with obstruction and without gangrene    Visual impairment    Vitamin D deficiency      Past Surgical History:   Procedure Laterality Date    Breast biopsy Left     Colonoscopy N/A 2018    Procedure: COLONOSCOPY;  Surgeon: Aneudy Cary MD;  Location: [facility] ENDOSCOPY    Echo 2d, cardio (internal)  16    EF 55-60%    Electrocardiogram, complete  2013    Scanned to media tab - DOS 2013    Hernia surgery  2017    Repair of incarcerated hernia by Dr Medley    Repair rotator cuff,acute      right    Ventral hernia repair  2017      Family History   Problem Relation Age of Onset    Pulmonary Disease Father         COPD    Hypertension Father     Other (Other) Father     Skin cancer Sister          of metastatic vulvar cancer    Hypertension Sister     Heart Attack Brother         MI    Heart Surgery Brother         CABG    Cataracts Sister       Social History     Socioeconomic History    Marital status:     Tobacco Use    Smoking status: Former     Current packs/day: 0.00     Types: Cigarettes     Quit date: 6/15/1974     Years since quittin.2     Passive exposure: Past    Smokeless tobacco: Never   Vaping Use    Vaping status: Never Used   Substance and Sexual Activity    Alcohol use: Yes     Alcohol/week: 2.0 standard drinks of alcohol     Types: 2 Cans of beer per week     Comment: once a wk    Drug use: No   Other Topics Concern    Caffeine Concern Yes     Comment: Coffee / Soda occasionally           REVIEW OF SYSTEMS:     Denies nause, fever, chills  No calf pain  Denies chest pain or SOB      EXAM:   There were no vitals taken for this visit.  GENERAL: well developed, well nourished, in no apparent distress  EXTREMITIES:   1. Integument: Normal skin temperature and turgor. Toenails x10 are elongated, thickened and discolored with subungal derbi.     2. Vascular: Dorsalis pedis two out of four bilateral and posterior tibial pulses two out of   four bilateral, capillary refill normal.   3. Musculoskeletal: All muscle groups are graded 5 out of 5 in the foot and ankle.   4. Neurological: Normal sharp dull sensation; reflexes normal.             ASSESSMENT AND PLAN:   Diagnoses and all orders for this visit:    Pain in toes of both feet          Plan:       -Patient examined, chart history reviewed.  -Discussed importance of proper pedal hygiene, regular foot checks, and tight glucose control.  -Sharply debrided nails x10 with a sterile nail nipper achieving a 20% reduction in thickness and length, without incident.   -Ambulate with supportive shoes and inserts and avoid walking barefoot.  -Educated patient on acute signs of infection advised patient to seek immediate medical attention if symptoms arise.    RTC in 3 months       The patient indicates understanding of these issues and agrees to the plan.        Christy Fraser DPM

## 2024-11-15 ENCOUNTER — LAB ENCOUNTER (OUTPATIENT)
Dept: LAB | Age: 69
End: 2024-11-15
Attending: INTERNAL MEDICINE
Payer: MEDICARE

## 2024-11-15 DIAGNOSIS — E55.9 VITAMIN D DEFICIENCY: ICD-10-CM

## 2024-11-15 DIAGNOSIS — I10 ESSENTIAL HYPERTENSION: ICD-10-CM

## 2024-11-15 DIAGNOSIS — E53.8 VITAMIN B 12 DEFICIENCY: ICD-10-CM

## 2024-11-15 LAB
ALBUMIN SERPL-MCNC: 4.4 G/DL (ref 3.2–4.8)
ALBUMIN/GLOB SERPL: 1.2 {RATIO} (ref 1–2)
ALP LIVER SERPL-CCNC: 85 U/L
ALT SERPL-CCNC: 12 U/L
ANION GAP SERPL CALC-SCNC: 7 MMOL/L (ref 0–18)
AST SERPL-CCNC: 28 U/L (ref ?–34)
BASOPHILS # BLD AUTO: 0.09 X10(3) UL (ref 0–0.2)
BASOPHILS NFR BLD AUTO: 2.1 %
BILIRUB SERPL-MCNC: 1.2 MG/DL (ref 0.2–1.1)
BUN BLD-MCNC: 13 MG/DL (ref 9–23)
BUN/CREAT SERPL: 15.1 (ref 10–20)
CALCIUM BLD-MCNC: 10.3 MG/DL (ref 8.7–10.4)
CHLORIDE SERPL-SCNC: 105 MMOL/L (ref 98–112)
CHOLEST SERPL-MCNC: 205 MG/DL (ref ?–200)
CO2 SERPL-SCNC: 29 MMOL/L (ref 21–32)
CREAT BLD-MCNC: 0.86 MG/DL
DEPRECATED RDW RBC AUTO: 42.1 FL (ref 35.1–46.3)
EGFRCR SERPLBLD CKD-EPI 2021: 73 ML/MIN/1.73M2 (ref 60–?)
EOSINOPHIL # BLD AUTO: 0.18 X10(3) UL (ref 0–0.7)
EOSINOPHIL NFR BLD AUTO: 4.2 %
ERYTHROCYTE [DISTWIDTH] IN BLOOD BY AUTOMATED COUNT: 12.2 % (ref 11–15)
FASTING PATIENT LIPID ANSWER: YES
FASTING STATUS PATIENT QL REPORTED: YES
GLOBULIN PLAS-MCNC: 3.8 G/DL (ref 2–3.5)
GLUCOSE BLD-MCNC: 94 MG/DL (ref 70–99)
HCT VFR BLD AUTO: 44.3 %
HDLC SERPL-MCNC: 68 MG/DL (ref 40–59)
HGB BLD-MCNC: 15 G/DL
IMM GRANULOCYTES # BLD AUTO: 0.01 X10(3) UL (ref 0–1)
IMM GRANULOCYTES NFR BLD: 0.2 %
LDLC SERPL CALC-MCNC: 124 MG/DL (ref ?–100)
LYMPHOCYTES # BLD AUTO: 1.08 X10(3) UL (ref 1–4)
LYMPHOCYTES NFR BLD AUTO: 25.5 %
MAGNESIUM SERPL-MCNC: 2 MG/DL (ref 1.6–2.6)
MCH RBC QN AUTO: 31.4 PG (ref 26–34)
MCHC RBC AUTO-ENTMCNC: 33.9 G/DL (ref 31–37)
MCV RBC AUTO: 92.9 FL
MONOCYTES # BLD AUTO: 0.54 X10(3) UL (ref 0.1–1)
MONOCYTES NFR BLD AUTO: 12.7 %
NEUTROPHILS # BLD AUTO: 2.34 X10 (3) UL (ref 1.5–7.7)
NEUTROPHILS # BLD AUTO: 2.34 X10(3) UL (ref 1.5–7.7)
NEUTROPHILS NFR BLD AUTO: 55.3 %
NONHDLC SERPL-MCNC: 137 MG/DL (ref ?–130)
OSMOLALITY SERPL CALC.SUM OF ELEC: 292 MOSM/KG (ref 275–295)
PLATELET # BLD AUTO: 231 10(3)UL (ref 150–450)
POTASSIUM SERPL-SCNC: 3.6 MMOL/L (ref 3.5–5.1)
PROT SERPL-MCNC: 8.2 G/DL (ref 5.7–8.2)
RBC # BLD AUTO: 4.77 X10(6)UL
SODIUM SERPL-SCNC: 141 MMOL/L (ref 136–145)
TRIGL SERPL-MCNC: 72 MG/DL (ref 30–149)
VIT B12 SERPL-MCNC: 679 PG/ML (ref 211–911)
VIT D+METAB SERPL-MCNC: 22.3 NG/ML (ref 30–100)
VLDLC SERPL CALC-MCNC: 13 MG/DL (ref 0–30)
WBC # BLD AUTO: 4.2 X10(3) UL (ref 4–11)

## 2024-11-15 PROCEDURE — 85025 COMPLETE CBC W/AUTO DIFF WBC: CPT

## 2024-11-15 PROCEDURE — 82306 VITAMIN D 25 HYDROXY: CPT

## 2024-11-15 PROCEDURE — 36415 COLL VENOUS BLD VENIPUNCTURE: CPT

## 2024-11-15 PROCEDURE — 82607 VITAMIN B-12: CPT

## 2024-11-15 PROCEDURE — 80061 LIPID PANEL: CPT

## 2024-11-15 PROCEDURE — 83735 ASSAY OF MAGNESIUM: CPT

## 2024-11-15 PROCEDURE — 80053 COMPREHEN METABOLIC PANEL: CPT

## 2024-11-21 ENCOUNTER — OFFICE VISIT (OUTPATIENT)
Dept: INTERNAL MEDICINE CLINIC | Facility: CLINIC | Age: 69
End: 2024-11-21

## 2024-11-21 VITALS
TEMPERATURE: 98 F | OXYGEN SATURATION: 97 % | HEIGHT: 60 IN | DIASTOLIC BLOOD PRESSURE: 70 MMHG | WEIGHT: 256.13 LBS | HEART RATE: 81 BPM | BODY MASS INDEX: 50.29 KG/M2 | SYSTOLIC BLOOD PRESSURE: 138 MMHG

## 2024-11-21 DIAGNOSIS — I83.93 VARICOSE VEINS OF BOTH LOWER EXTREMITIES WITHOUT ULCER OR INFLAMMATION: ICD-10-CM

## 2024-11-21 DIAGNOSIS — Z00.00 MEDICARE ANNUAL WELLNESS VISIT, SUBSEQUENT: Primary | ICD-10-CM

## 2024-11-21 DIAGNOSIS — E55.9 VITAMIN D DEFICIENCY: ICD-10-CM

## 2024-11-21 DIAGNOSIS — Z78.0 ASYMPTOMATIC POSTMENOPAUSAL STATE: ICD-10-CM

## 2024-11-21 DIAGNOSIS — Z00.00 ROUTINE HEALTH MAINTENANCE: ICD-10-CM

## 2024-11-21 DIAGNOSIS — I87.2 VENOUS INSUFFICIENCY OF BOTH LOWER EXTREMITIES: ICD-10-CM

## 2024-11-21 DIAGNOSIS — I10 ESSENTIAL HYPERTENSION: ICD-10-CM

## 2024-11-21 DIAGNOSIS — I48.91 ATRIAL FIBRILLATION, UNSPECIFIED TYPE (HCC): ICD-10-CM

## 2024-11-21 PROCEDURE — 99214 OFFICE O/P EST MOD 30 MIN: CPT | Performed by: INTERNAL MEDICINE

## 2024-11-21 PROCEDURE — G0439 PPPS, SUBSEQ VISIT: HCPCS | Performed by: INTERNAL MEDICINE

## 2024-11-21 NOTE — PROGRESS NOTES
Sonali Snider is a 69 year old female.  HPI:     Chief Complaint   Patient presents with    Physical     MA      Sonali presents for Medicare annual wellness visit    She feels well.  She has no acute complaints.    She follows with  her breast surgeon and is up-to-date with her mammograms.    We talked about flu vaccine.  However the last flu vaccine she got she got \"sick\".  She indicated that she had some word finding difficulties and some left arm weakness and fever and generalized feeling ill.  She did not feel at the time it was not felt that she had a TIA.  She has been asymptomatic since.    For now I feel that that she should not get the flu vaccine.  She wishes not to have any other vaccines.    She had colonoscopy 2018 with next colonoscopy due 2028    She has permanent atrial fibrillation.  On Eliquis for stroke prophylaxis.  She will follow-up with Dr. Benitez next February    She sees her dermatologist  who has her on alternating triamcinolone for 2 weeks and then off for 2 weeks.  This is for lower extremity dermatitis from her venous insufficiency    She has venous insufficiency.  She does have chronic edema.  We talked about this.  I did tell her about support hose.  She indicated that she was told that these may not work but I did advise her to see Dr. Campos again and did put a referral in.    I did give her DEXA scan order if she wishes to pursue.        Current Outpatient Medications   Medication Sig Dispense Refill    Azelaic Acid 15 % External Gel       Clindamycin Phos-Benzoyl Perox 1-5 % External Gel Apply 1 Application topically daily as needed.      mupirocin 2 % External Ointment mupirocin 2 % topical ointment, [RxNorm: 516744]      triamcinolone 0.1 % External Cream Apply topically 2 (two) times daily.      triamterene-hydroCHLOROthiazide 37.5-25 MG Oral Cap Take 1 capsule by mouth daily. 90 capsule 3    albuterol 108 (90 Base) MCG/ACT Inhalation Aero Soln  Inhale 2 puffs into the lungs every 4 (four) hours as needed for Wheezing. 1 each 11    Potassium Chloride ER 8 MEQ Oral Tab CR Take 1 tablet (8 mEq total) by mouth daily. 90 tablet 3    Ibuprofen 200 MG Oral Cap Take 1 capsule (200 mg total) by mouth every 6 (six) hours as needed.      CARTIA  MG Oral Capsule SR 24 Hr Take 1 capsule (120 mg total) by mouth daily.      acetaminophen 500 MG Oral Tab Take 1 tablet (500 mg total) by mouth every 6 (six) hours as needed for Pain.      Vitamin B-12 1000 MCG Oral Tab Take 1 tablet (1,000 mcg total) by mouth every other day.      ELIQUIS 5 MG Oral Tab Take 1 tablet (5 mg total) by mouth 2 (two) times daily.  0    Vitamin C (VITAMIN C) 500 MG Oral Tab Take 1 tablet (500 mg total) by mouth every other day.      Calcium Carb-Cholecalciferol 600-800 MG-UNIT Oral Tab Take 1 tablet by mouth every other day.        Vitamin D3 (VITAMIN D3) 1000 UNITS Oral Tab Take 1 tablet (1,000 Units total) by mouth daily.      loratadine (CLARITIN) 10 MG Oral Tab Take 1 tablet (10 mg total) by mouth daily as needed.      Multiple Vitamin (MULTIVITAMINS) Oral Cap Take 1 capsule by mouth every other day. (Patient not taking: Reported on 11/21/2024)        Past Medical History:    Abnormal mammogram    Allergic rhinitis    Arrhythmia    Arthritis    Asthma (HCC)    Atrial fibrillation (HCC)    Bronchitis    Elevated blood pressure    Extrinsic asthma, unspecified    Incisional hernia, incarcerated    Obesity    Osteoarthritis    Rosacea    Varicose veins    Varicose veins    Venous insufficiency    Ventral hernia with obstruction and without gangrene    Visual impairment    Vitamin D deficiency      Social History:  Social History     Socioeconomic History    Marital status:    Tobacco Use    Smoking status: Former     Current packs/day: 0.00     Average packs/day: 0.4 packs/day for 33.0 years (13.2 ttl pk-yrs)     Types: Cigarettes     Quit date: 6/15/1974     Years since quitting:  50.4     Passive exposure: Past    Smokeless tobacco: Never   Vaping Use    Vaping status: Never Used   Substance and Sexual Activity    Alcohol use: Yes     Alcohol/week: 5.0 standard drinks of alcohol     Types: 5 Cans of beer per week     Comment: once a wk    Drug use: No   Other Topics Concern    Caffeine Concern Yes     Comment: Coffee / Soda occasionally        REVIEW OF SYSTEMS:   GENERAL HEALTH:  feels well otherwise  RESPIRATORY:  Voices no shortness of breath with exertion or cough  CARDIOVASCULAR:  Voices no chest pain on exertion or shortness of breath  GI:   Voices no abdominal pain or changes of bowels   :Viices no urning or frequency of urination.  NEURO:  Voices no  headaches or dizziness    EXAM:   /70 (BP Location: Right arm, Patient Position: Sitting, Cuff Size: large)   Pulse 81   Temp 98.4 °F (36.9 °C) (Oral)   Ht 5' (1.524 m)   Wt 256 lb 1.6 oz (116.2 kg)   SpO2 97%   BMI 50.02 kg/m²     GENERAL:  well developed, well nourished, in no apparent distress  SKIN:  no rashes   HEENT: atraumatic.  Pharynx normal without exudate.  EYES:  PERRL. Sclera anicteric.  NECK:  Supple,  no adenopathy,  thyroid normal  LUNGS:  clear to auscultation.  Effort normal  CARDIO:  RRR without murmur.   S1 and S2 normal  GI:  good BS's,  no masses,   HSM or tenderness  EXTREMITIES : She has chronic venous insufficiency lower extremities.  There is thickened skin lower extremities proximal to the ankle with some \"ridging\".  However no ulceration.  Again I spoke to her that I was concerned about this and thought she should see Dr. Campos again who she saw in the past and had procedures and to consider support hose.  She verbalizes understanding I did give her Dr. Morrison contact information      General Health     In the past six months, have you lost more than 10 pounds without trying?: (Patient-Rptd) 2 - No    Has your appetite been poor?: (Patient-Rptd) No    Type of Diet: (Patient-Rptd)  Balanced    How does the patient maintain a good energy level?: (Patient-Rptd) Daily Walks    How would you describe your daily physical activity?: (Patient-Rptd) Light    How would you describe your current health state?: (Patient-Rptd) Good    How do you maintain positive mental well-being?: (Patient-Rptd) Social Interaction;Puzzles;Games;Visiting Friends;Visiting Family         Have you had any immunizations at another office such as Influenza, Hepatitis B, Tetanus, or Pneumococcal?: (Patient-Rptd) No     Functional Ability     Bathing or Showering: (Patient-Rptd) Cannot do without help    Toileting: (Patient-Rptd) Cannot do without help    Dressing: (Patient-Rptd) Cannot do without help    Eating: (Patient-Rptd) Cannot do without help    Driving: (Patient-Rptd) Cannot do without help    Preparing your meals: (Patient-Rptd) Cannot do without help    Managing money/bills: (Patient-Rptd) Cannot do without help    Taking medications as prescribed: (Patient-Rptd) Cannot do without help    Are you able to afford your medications?: (Patient-Rptd) Yes    Hearing Problems?: (Patient-Rptd) No     Functional Status     Hearing Problems?: (Patient-Rptd) No    Vision Problems? : (Patient-Rptd) Yes    Difficulty walking?: (Patient-Rptd) No    Difficulty dressing or bathing?: (Patient-Rptd) No    Problems with daily activities? : (Patient-Rptd) No    Memory Problems?: (Patient-Rptd) No      Fall/Risk Assessment                                                              Depression Screening (PHQ-2/PHQ-9): Over the LAST 2 WEEKS                      Advance Directives     Do you have a healthcare power of ?: (Patient-Rptd) Yes    Do you have a living will?: (Patient-Rptd) Yes     Hearing Assessment (Required for AWV/SWV)      Hearing Screening    Time taken: 11/21/2024 12:55 PM  Entry User: Anna Mccollum RN  Screening Method: Whisper Test  Whisper Test Result: Pass         Visual Acuity     Right Eye Visual Acuity:  Corrected (wears glasses) Left Eye Visual Acuity: Corrected   Right Eye Chart Acuity: 20/20 Left Eye Chart Acuity: 20/20     Cognitive Assessment     What day of the week is this?: Correct    What month is it?: Correct    What year is it?: Correct    Recall \"Ball\": Correct    Recall \"Flag\": Correct    Recall \"Tree\": Correct         Sonali Alanna Agatha's SCREENING SCHEDULE   Tests on this list are recommended by your physician but may not be covered, or covered at this frequency, by your insurer. Please check with your insurance carrier before scheduling to verify coverage.   PREVENTATIVE SERVICES  INDICATIONS AND SCHEDULE Internal Lab or Procedure External Lab or Procedure   Diabetes Screening      HbgA1C   Annually No results found for: \"A1C\"      No data to display                Fasting Blood Sugar (FSB)Annually Glucose (mg/dL)   Date Value   11/15/2024 94         No data to display               Cardiovascular Disease Screening     LDL Annually LDL Cholesterol (mg/dL)   Date Value   11/15/2024 124 (H)        EKG One Time     Colorectal Cancer Screening      Colonoscopy Screen every 10 years Health Maintenance   Topic Date Due    Colorectal Cancer Screening  08/07/2028    Update Health Maintenance if applicable    Flex Sigmoidoscopy Screen every 5 years No results found for this or any previous visit.      No data to display                 Fecal Occult Blood Annually No results found for: \"FOB\", \"OCCULTSTOOL\"      No data to display                Glaucoma Screening      Ophthalmology Visit Annually     Bone Density Screening      Dexascan Every two years Last Dexa Scan:    DEXA - BONE DENSITOMETRY 11/29/2012        No data to display               Pap and Pelvic      Pap: Every 3 yrs age 21-65 or Pap+HPV every 5 yrs age 30-65, age 65 and older at high risk   No recommendations at this time Update Health Maintenance if applicable    Chlamydia  Annually if high risk No results found for: \"CHLAMYDIA\"      No  data to display                Screening Mammogram      Mammogram  Annually Health Maintenance   Topic Date Due    Mammogram  08/24/2024    Update Health Maintenance if applicable   Immunizations      Influenza No orders found for this or any previous visit. Update Immunization Activity if applicable    Pneumococcal No orders found for this or any previous visit. Update Immunization Activity if applicable    Hepatitis B No orders found for this or any previous visit. Update Immunization Activity if applicable    Tetanus No orders found for this or any previous visit. Update Immunization Activity if applicable    Zoster (Not covered by Medicare Part B) No orders found for this or any previous visit. Update Immunization Activity if applicable     SPECIFIC DISEASE MONITORING Internal Lab or Procedure External Lab or Procedure   Annual Monitoring of Persistent     Medications (ACE/ARB, digoxin, diuretics)    Potassium  Annually Potassium (mmol/L)   Date Value   11/15/2024 3.6     POTASSIUM (P) (mmol/L)   Date Value   08/02/2016 3.6         No data to display                Creatinine  Annually Creatinine (mg/dL)   Date Value   11/15/2024 0.86         No data to display                Digoxin Serum Conc  Annually No results found for: \"DIGOXIN\"      No data to display                Diabetes      HgbA1C  Annually No results found for: \"A1C\"      No data to display                Creat/alb ratio  Annually      LDL  Annually LDL Cholesterol (mg/dL)   Date Value   11/15/2024 124 (H)         No data to display                 Dilated Eye exam  Annually      No data to display                   No data to display                COPD      Spirometry Testing Annually No results found for this or any previous visit.      No data to display                       ASSESSMENT AND PLAN:     1. Medicare annual wellness visit, subsequent  Medicare annual wellness visit    2. Essential hypertension  Blood pressure under good control on  current therapy.  However we talked about this and she indicated that she is not diltiazem for blood pressure necessarily but for her atrial fibrillation rate control.  We spoke about statin therapy as she does have a mildly increased risk of future cardiac events but she is adamant she wishes not to start statin therapy.    3. Atrial fibrillation, unspecified type (HCC)  Controlled.  On Eliquis for stroke prophylaxis.    4. Vitamin D deficiency  She takes vitamin D 1000 units every other day and I suggested she take it every day.    5. Varicose veins of both lower extremities without ulcer or inflammation  We talked about this.  She has seen Dr. Campos.  Have asked her to see him again for any other therapies or suggestions he may have.  I did tell her I was concerned if she would get a wound lower extremity it may be difficult to heal.    6. Routine health maintenance  Will avoid flu vaccine because she did get sick before.  She wishes to avoid other vaccines    7. Asymptomatic postmenopausal state  DEXA scan.  - XR DEXA BONE DENSITOMETRY (CPT=77080); Future    8. Venous insufficiency of both lower extremities  Referred back to Dr. Last dose.    This visit was 30 minutes.  I spent 10 minutes before visit preparing and reviewing old records.  Greater than 50% of the visit was engaged in counseling and review of past data.    Follow-up in 6 months.    Problem list as noted in electronic health record reviewed.  Stable.  Will be followed.    The patient indicates understanding of these issues and agrees to the plan.    Wellington Mcclellan MD  11/21/2024  1:26 PM    Addendum: November 22, 2024.  Sonali called indicating that her functional ability is normal and she is able to do all things.  She is able to take care of all her activities of daily living.    Electronically signed November 22, 2024 at 12:30 PM

## 2024-11-22 ENCOUNTER — TELEPHONE (OUTPATIENT)
Dept: INTERNAL MEDICINE CLINIC | Facility: CLINIC | Age: 69
End: 2024-11-22

## 2024-11-22 NOTE — TELEPHONE ENCOUNTER
We can tell Sonali that I will addend my note.  I am not sure how to get it out of the \"computer system called EPIC since EPIC was chosen by the hospital administration and I do not know all the specifics on how it works to \"delete\" things.

## 2024-11-22 NOTE — TELEPHONE ENCOUNTER
Patient called - at her MA visit some questions were marked incorrectly -functional Ability - patient can do all by herself     Tried to update - unable to do - to

## 2024-11-22 NOTE — TELEPHONE ENCOUNTER
Patient called, she was reading her after visit summary and some of the information on there is incorrect.    Please contact patient to have it updated

## 2024-12-09 ENCOUNTER — OFFICE VISIT (OUTPATIENT)
Dept: PODIATRY CLINIC | Facility: CLINIC | Age: 69
End: 2024-12-09

## 2024-12-09 DIAGNOSIS — B35.1 ONYCHOMYCOSIS: Primary | ICD-10-CM

## 2024-12-09 DIAGNOSIS — M79.674 PAIN IN TOES OF BOTH FEET: ICD-10-CM

## 2024-12-09 DIAGNOSIS — M79.675 PAIN IN TOES OF BOTH FEET: ICD-10-CM

## 2024-12-09 DIAGNOSIS — I87.2 VENOUS INSUFFICIENCY: ICD-10-CM

## 2024-12-09 NOTE — PROGRESS NOTES
Reason for Visit      Sonali Snider is a 69 year old female presents today complaining of bilateral foot care and evaluation.     History of Present Illness     Patient presents to clinic today after last being seen by podiatry on 8/27/24 for routine foot care.  Patient does have a history of A-fib on Eliquis. Patient presents complaining of elongated, thickened, incurvated nails that she is unable to debride herself.     The following portions of the patient's history were reviewed and updated as appropriate: allergies, current medications, past family history, past medical history, past social history, past surgical history and problem list.    Allergies[1]      Current Outpatient Medications:     Azelaic Acid 15 % External Gel, , Disp: , Rfl:     Clindamycin Phos-Benzoyl Perox 1-5 % External Gel, Apply 1 Application topically daily as needed., Disp: , Rfl:     mupirocin 2 % External Ointment, mupirocin 2 % topical ointment, [RxNorm: 809653], Disp: , Rfl:     triamcinolone 0.1 % External Cream, Apply topically 2 (two) times daily., Disp: , Rfl:     triamterene-hydroCHLOROthiazide 37.5-25 MG Oral Cap, Take 1 capsule by mouth daily., Disp: 90 capsule, Rfl: 3    albuterol 108 (90 Base) MCG/ACT Inhalation Aero Soln, Inhale 2 puffs into the lungs every 4 (four) hours as needed for Wheezing., Disp: 1 each, Rfl: 11    Potassium Chloride ER 8 MEQ Oral Tab CR, Take 1 tablet (8 mEq total) by mouth daily., Disp: 90 tablet, Rfl: 3    Ibuprofen 200 MG Oral Cap, Take 1 capsule (200 mg total) by mouth every 6 (six) hours as needed., Disp: , Rfl:     CARTIA  MG Oral Capsule SR 24 Hr, Take 1 capsule (120 mg total) by mouth daily., Disp: , Rfl:     acetaminophen 500 MG Oral Tab, Take 1 tablet (500 mg total) by mouth every 6 (six) hours as needed for Pain., Disp: , Rfl:     Vitamin B-12 1000 MCG Oral Tab, Take 1 tablet (1,000 mcg total) by mouth every other day., Disp: , Rfl:     ELIQUIS 5 MG Oral Tab, Take 1 tablet (5  mg total) by mouth 2 (two) times daily., Disp: , Rfl: 0    Vitamin C (VITAMIN C) 500 MG Oral Tab, Take 1 tablet (500 mg total) by mouth every other day., Disp: , Rfl:     Calcium Carb-Cholecalciferol 600-800 MG-UNIT Oral Tab, Take 1 tablet by mouth every other day.  , Disp: , Rfl:     Vitamin D3 (VITAMIN D3) 1000 UNITS Oral Tab, Take 1 tablet (1,000 Units total) by mouth daily., Disp: , Rfl:     Multiple Vitamin (MULTIVITAMINS) Oral Cap, Take 1 capsule by mouth every other day. (Patient not taking: Reported on 2024), Disp: , Rfl:     loratadine (CLARITIN) 10 MG Oral Tab, Take 1 tablet (10 mg total) by mouth daily as needed., Disp: , Rfl:     There are no discontinued medications.    Patient Active Problem List   Diagnosis    Abnormal mammogram    Edema    Microscopic hematuria    Vitamin D deficiency    Venous insufficiency    Varicose veins    Atrial fibrillation (HCC)    Small bowel obstruction (HCC)       Past Medical History:    Abnormal mammogram    Allergic rhinitis    Arrhythmia    Arthritis    Asthma (HCC)    Atrial fibrillation (HCC)    Bronchitis    Elevated blood pressure    Extrinsic asthma, unspecified    Incisional hernia, incarcerated    Obesity    Osteoarthritis    Rosacea    Varicose veins    Varicose veins    Venous insufficiency    Ventral hernia with obstruction and without gangrene    Visual impairment    Vitamin D deficiency       Past Surgical History:   Procedure Laterality Date    Breast biopsy Left       78    Colonoscopy N/A 2018    Procedure: COLONOSCOPY;  Surgeon: Aneudy Cary MD;  Location: [facility] ENDOSCOPY    D & c  77    Echo 2d, cardio (internal)  2016    EF 55-60%    Electrocardiogram, complete  2013    Scanned to media tab - DOS 2013    Hernia surgery  2017    Repair of incarcerated hernia by Dr Medley    Other surgical history  20    ablation vericose veins    Repair rotator cuff,acute      right    Ventral  hernia repair  2017       Family History   Problem Relation Age of Onset    Pulmonary Disease Father         COPD    Hypertension Father     Other (Other) Father     Heart Disorder Father     Obesity Father     Skin cancer Sister          of metastatic vulvar cancer    Hypertension Sister     Anemia Sister     Cancer Sister     Obesity Sister     Heart Attack Brother         MI    Heart Surgery Brother         CABG    Obesity Brother     Cataracts Sister     Heart Disorder Mother     Diabetes Maternal Grandmother        Social History     Occupational History    Not on file   Tobacco Use    Smoking status: Former     Current packs/day: 0.00     Average packs/day: 0.4 packs/day for 33.0 years (13.2 ttl pk-yrs)     Types: Cigarettes     Quit date: 6/15/1974     Years since quittin.5     Passive exposure: Past    Smokeless tobacco: Never   Vaping Use    Vaping status: Never Used   Substance and Sexual Activity    Alcohol use: Yes     Alcohol/week: 5.0 standard drinks of alcohol     Types: 5 Cans of beer per week     Comment: once a wk    Drug use: No    Sexual activity: Not on file       ROS      Constitutional: negative for chills, fevers and sweats  Gastrointestinal: negative for abdominal pain, diarrhea, nausea and vomiting  Genitourinary:negative for dysuria and hematuria  Musculoskeletal:negative for arthralgias and muscle weakness  Neurological: negative for paresthesia and weakness  All others reviewed and negative.      Physical Exam     LE PHYSICAL EXAM    Constitution: Well-developed and well-nourished. Gait appears normal. No apparent distress. Alert and oriented to person, place, and time.  Integument: There are no varicosities. Skin appears moist, warm, and supple with positive hair growth. There are no color changes. No open lesions. No macerations, No Hyperkeratotic lesions.  Vascular examination: Dorsalis pedis and posterior tibial pulses are strong bilaterally with capillary filling time  less than 3 seconds to all digits. There is no peripheral edema..  Neurological Sensorium: Grossly intact to sharp/dull. Vibratory: Intact.  Musculoskeletal:   5/5 pedal muscle strength b/l     Advanced trophic changes as: hair growth decrease  nail changes  (thickening) pigmentary changes (discoloration) skin texture (thin, shiny)   Procedure       Nails 1 through 5 bilateral debrided with use of a nail nipper.  Patient Toller procedure well had no complications.  Neurovascular status intact to the level of the digits post procedure.     Assessment and Plan     Encounter Diagnoses   Name Primary?    Onychomycosis Yes    Venous insufficiency     Pain in toes of both feet        Evaluated the patient. Discussed treatment options with the patient.  Discussed with patient proper care and hygiene for their feet.  Patient tolerated procedures well, without incident.    Instrumentation used includes nail nippers and electric  where appropriate.  Procedure: (07060 Debridement of toenails 6-10) Surgically debrided and mechanically reduced 6 or more toenails.Hemmorhage occurred none.Nails that were debrided appeared dystrophic and caused the patient pain in shoe gear.Nails 5 Left & 5 Right.     Evaluated patient. Discussed treatment options with patient.  Discussed proper hygiene and care for feet as well as use of emollient creams (ie Urea based creams)  Answered all patient questions. Discussed offloading hyperkeratotic lesions with proper shoe gear, offloading pads, and insoles.   Patient was instructed to call the office or on-call podiatric physician immediately with any issues or concerns before the next scheduled visit. Patient to follow-up in clinic in 3 months      Tricia Anglin DPM, D.[facility]  Diplomat, American Board of Foot and Ankle Surgery  Certified in Foot and Rearfoot/Ankle Reconstruction  Fellow of the American College of Foot and Ankle Surgeons  Fellowship Trained Foot and Ankle  Surgeon   [facility]     12/9/2024    7:41 AM         [1]   Allergies  Allergen Reactions    Amoxicillin RASH    Bioflex SWELLING and JITTERY     Slurred speech, drooling    Doxycycline SHORTNESS OF BREATH     Other reaction(s): Trouble Breathing    Doxycycline Hyclate PAIN and SHORTNESS OF BREATH     Other reaction(s): vomiting & joint pain    Minocycline SHORTNESS OF BREATH     Other reaction(s): Trouble Breathing    Orphenadrine OTHER (SEE COMMENTS)     Other reaction(s): shakey    Ra Glucosamine-Chondroitin OTHER (SEE COMMENTS)     bioflex brand-tongue swelling, difficulty breathing  bioflex brand-tongue swelling, difficulty breathing      Cetylpyridinium-Benzocaine OTHER (SEE COMMENTS)    Orphenadrine Citrate OTHER (SEE COMMENTS)     Other reaction(s): shakey    Shellfish UNKNOWN    Sulfanilamide      Other reaction(s): Trouble Breathing    Cefaclor RASH     Other reaction(s): Rash    Clarithromycin RASH     Other reaction(s): Rash    Metronidazole RASH and ITCHING     Other reaction(s): Rash

## 2025-01-16 ENCOUNTER — NURSING HOME VISIT (OUTPATIENT)
Dept: INTERNAL MEDICINE | Age: 70
End: 2025-01-16

## 2025-01-16 ENCOUNTER — PREOP/H&P TRANS (OUTPATIENT)
Dept: INTERNAL MEDICINE | Age: 70
End: 2025-01-16

## 2025-01-16 DIAGNOSIS — S82.402B TYPE I OR II OPEN FRACTURE OF LEFT TIBIA AND FIBULA, INITIAL ENCOUNTER: ICD-10-CM

## 2025-01-16 DIAGNOSIS — S72.401A CLOSED FRACTURE OF DISTAL END OF RIGHT FEMUR, UNSPECIFIED FRACTURE MORPHOLOGY, INITIAL ENCOUNTER (CMD): ICD-10-CM

## 2025-01-16 DIAGNOSIS — S12.190A: ICD-10-CM

## 2025-01-16 DIAGNOSIS — Z98.890 S/P ORIF (OPEN REDUCTION INTERNAL FIXATION) FRACTURE: ICD-10-CM

## 2025-01-16 DIAGNOSIS — Z87.81 S/P ORIF (OPEN REDUCTION INTERNAL FIXATION) FRACTURE: ICD-10-CM
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
## 2025-01-16 DIAGNOSIS — S82.202B TYPE I OR II OPEN FRACTURE OF LEFT TIBIA AND FIBULA, INITIAL ENCOUNTER: ICD-10-CM

## 2025-01-16 PROCEDURE — 99306 1ST NF CARE HIGH MDM 50: CPT | Performed by: INTERNAL MEDICINE

## 2025-02-10 ENCOUNTER — EXTERNAL FACILITY (OUTPATIENT)
Dept: INTERNAL MEDICINE CLINIC | Facility: CLINIC | Age: 70
End: 2025-02-10

## 2025-02-10 ENCOUNTER — TELEPHONE (OUTPATIENT)
Dept: SURGERY | Facility: CLINIC | Age: 70
End: 2025-02-10

## 2025-02-10 DIAGNOSIS — Z87.81 HISTORY OF CERVICAL FRACTURE: ICD-10-CM

## 2025-02-10 DIAGNOSIS — I10 PRIMARY HYPERTENSION: ICD-10-CM

## 2025-02-10 DIAGNOSIS — Z86.79 HISTORY OF ATRIAL FIBRILLATION: ICD-10-CM

## 2025-02-10 DIAGNOSIS — E83.42 HYPOMAGNESEMIA: ICD-10-CM
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
## 2025-02-10 DIAGNOSIS — S82.202G TIBIA/FIBULA FRACTURE, LEFT, CLOSED, WITH DELAYED HEALING, SUBSEQUENT ENCOUNTER: ICD-10-CM

## 2025-02-10 DIAGNOSIS — L08.9 INFECTED WOUND: ICD-10-CM

## 2025-02-10 DIAGNOSIS — Z87.81 S/P RIGHT HIP FRACTURE: ICD-10-CM

## 2025-02-10 DIAGNOSIS — E66.9 OBESITY WITH SERIOUS COMORBIDITY, UNSPECIFIED CLASS, UNSPECIFIED OBESITY TYPE: ICD-10-CM

## 2025-02-10 DIAGNOSIS — S82.402G TIBIA/FIBULA FRACTURE, LEFT, CLOSED, WITH DELAYED HEALING, SUBSEQUENT ENCOUNTER: ICD-10-CM

## 2025-02-10 DIAGNOSIS — T14.8XXA INFECTED WOUND: ICD-10-CM

## 2025-02-10 PROCEDURE — 99306 1ST NF CARE HIGH MDM 50: CPT | Performed by: INTERNAL MEDICINE

## 2025-02-10 NOTE — TELEPHONE ENCOUNTER
Imaging reports received on 2/10/2025 from [facility]. DOS 2/6/2025 CT cervical spine. DOS 1/29/2025 XR cervical spine AP lateral. These reports will be placed on Rakesh's desk for review and then sent to scanning.

## 2025-02-12 PROBLEM — I10 PRIMARY HYPERTENSION: Status: ACTIVE | Noted: 2025-02-12

## 2025-02-12 PROBLEM — E66.9 OBESITY WITH SERIOUS COMORBIDITY: Status: ACTIVE | Noted: 2025-02-12

## 2025-02-12 PROBLEM — S82.202G: Status: ACTIVE | Noted: 2025-02-12

## 2025-02-12 PROBLEM — S82.402G: Status: ACTIVE | Noted: 2025-02-12

## 2025-02-12 PROBLEM — Z87.81 S/P RIGHT HIP FRACTURE: Status: ACTIVE | Noted: 2025-02-12

## 2025-02-12 PROBLEM — Z86.79 HISTORY OF ATRIAL FIBRILLATION: Status: ACTIVE | Noted: 2025-02-12

## 2025-02-12 PROBLEM — Z87.81 HISTORY OF CERVICAL FRACTURE: Status: ACTIVE | Noted: 2025-02-12

## 2025-02-12 PROBLEM — T14.8XXA INFECTED WOUND: Status: ACTIVE | Noted: 2025-02-12

## 2025-02-12 PROBLEM — E83.42 HYPOMAGNESEMIA: Status: ACTIVE | Noted: 2025-02-12
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
## 2025-02-12 PROBLEM — L08.9 INFECTED WOUND: Status: ACTIVE | Noted: 2025-02-12
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
## 2025-02-13 ENCOUNTER — EXTERNAL FACILITY (OUTPATIENT)
Dept: INTERNAL MEDICINE CLINIC | Facility: CLINIC | Age: 70
End: 2025-02-13

## 2025-02-13 DIAGNOSIS — S82.402G TIBIA/FIBULA FRACTURE, LEFT, CLOSED, WITH DELAYED HEALING, SUBSEQUENT ENCOUNTER: ICD-10-CM

## 2025-02-13 DIAGNOSIS — Z87.81 S/P RIGHT HIP FRACTURE: ICD-10-CM

## 2025-02-13 DIAGNOSIS — Z87.81 HISTORY OF CERVICAL FRACTURE: ICD-10-CM

## 2025-02-13 DIAGNOSIS — S82.202G TIBIA/FIBULA FRACTURE, LEFT, CLOSED, WITH DELAYED HEALING, SUBSEQUENT ENCOUNTER: ICD-10-CM

## 2025-02-13 DIAGNOSIS — T14.8XXA INFECTED WOUND: ICD-10-CM
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
## 2025-02-13 DIAGNOSIS — L08.9 INFECTED WOUND: ICD-10-CM

## 2025-02-13 PROCEDURE — 99309 SBSQ NF CARE MODERATE MDM 30: CPT | Performed by: INTERNAL MEDICINE

## 2025-02-17 ENCOUNTER — EXTERNAL FACILITY (OUTPATIENT)
Dept: INTERNAL MEDICINE CLINIC | Facility: CLINIC | Age: 70
End: 2025-02-17

## 2025-02-17 ENCOUNTER — TELEPHONE (OUTPATIENT)
Dept: SURGERY | Facility: CLINIC | Age: 70
End: 2025-02-17

## 2025-02-17 DIAGNOSIS — Z87.81 HISTORY OF CERVICAL FRACTURE: ICD-10-CM

## 2025-02-17 DIAGNOSIS — Z87.81 S/P RIGHT HIP FRACTURE: ICD-10-CM

## 2025-02-17 DIAGNOSIS — S82.402G TIBIA/FIBULA FRACTURE, LEFT, CLOSED, WITH DELAYED HEALING, SUBSEQUENT ENCOUNTER: ICD-10-CM
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
## 2025-02-17 DIAGNOSIS — L08.9 INFECTED WOUND: ICD-10-CM

## 2025-02-17 DIAGNOSIS — S82.202G TIBIA/FIBULA FRACTURE, LEFT, CLOSED, WITH DELAYED HEALING, SUBSEQUENT ENCOUNTER: ICD-10-CM

## 2025-02-17 DIAGNOSIS — T14.8XXA INFECTED WOUND: ICD-10-CM

## 2025-02-17 PROCEDURE — 99309 SBSQ NF CARE MODERATE MDM 30: CPT | Performed by: INTERNAL MEDICINE

## 2025-02-17 NOTE — TELEPHONE ENCOUNTER
[place] office received imaging from [facility] for Bj Reyes PA-C, to review.     02/06/2025 - CT Cervical Spine WO Contrast   01/29/2025 - XR Cervical Spine AP LAT     Films uploaded to PACS.   Disc will be returned to the patient at her appointment with Bj Reyes on 02/20/2025.   Copy of report will be made at her appointment time.   Copy of report will be sent to the scanning department.

## 2025-02-18 ENCOUNTER — INITIAL APN SNF VISIT (OUTPATIENT)
Dept: INTERNAL MEDICINE CLINIC | Facility: SKILLED NURSING FACILITY | Age: 70
End: 2025-02-18

## 2025-02-18 DIAGNOSIS — Z87.81 HISTORY OF CERVICAL FRACTURE: ICD-10-CM

## 2025-02-18 DIAGNOSIS — S82.202G TIBIA/FIBULA FRACTURE, LEFT, CLOSED, WITH DELAYED HEALING, SUBSEQUENT ENCOUNTER: ICD-10-CM

## 2025-02-18 DIAGNOSIS — T14.8XXA INFECTED WOUND: ICD-10-CM
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
## 2025-02-18 DIAGNOSIS — I10 PRIMARY HYPERTENSION: ICD-10-CM

## 2025-02-18 DIAGNOSIS — S82.402G TIBIA/FIBULA FRACTURE, LEFT, CLOSED, WITH DELAYED HEALING, SUBSEQUENT ENCOUNTER: ICD-10-CM

## 2025-02-18 DIAGNOSIS — L08.9 INFECTED WOUND: ICD-10-CM

## 2025-02-18 PROCEDURE — 1123F ACP DISCUSS/DSCN MKR DOCD: CPT | Performed by: NURSE PRACTITIONER

## 2025-02-18 PROCEDURE — 99306 1ST NF CARE HIGH MDM 50: CPT | Performed by: NURSE PRACTITIONER
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
## 2025-02-20 ENCOUNTER — EXTERNAL FACILITY (OUTPATIENT)
Dept: INTERNAL MEDICINE CLINIC | Facility: CLINIC | Age: 70
End: 2025-02-20

## 2025-02-20 ENCOUNTER — OFFICE VISIT (OUTPATIENT)
Dept: SURGERY | Facility: CLINIC | Age: 70
End: 2025-02-20
Payer: MEDICARE

## 2025-02-20 VITALS
DIASTOLIC BLOOD PRESSURE: 73 MMHG | WEIGHT: 256 LBS | SYSTOLIC BLOOD PRESSURE: 121 MMHG | HEIGHT: 60 IN | HEART RATE: 103 BPM | OXYGEN SATURATION: 100 % | BODY MASS INDEX: 50.26 KG/M2
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
## 2025-02-20 DIAGNOSIS — T14.8XXA INFECTED WOUND: ICD-10-CM

## 2025-02-20 DIAGNOSIS — S82.202G TIBIA/FIBULA FRACTURE, LEFT, CLOSED, WITH DELAYED HEALING, SUBSEQUENT ENCOUNTER: ICD-10-CM

## 2025-02-20 DIAGNOSIS — Z87.81 S/P RIGHT HIP FRACTURE: ICD-10-CM

## 2025-02-20 DIAGNOSIS — Z87.81 HISTORY OF CERVICAL FRACTURE: ICD-10-CM

## 2025-02-20 DIAGNOSIS — L08.9 INFECTED WOUND: ICD-10-CM

## 2025-02-20 DIAGNOSIS — S12.190A OTHER CLOSED DISPLACED FRACTURE OF SECOND CERVICAL VERTEBRA, INITIAL ENCOUNTER (HCC): Primary | ICD-10-CM

## 2025-02-20 DIAGNOSIS — M43.6 NECK STIFFNESS: ICD-10-CM

## 2025-02-20 DIAGNOSIS — S82.402G TIBIA/FIBULA FRACTURE, LEFT, CLOSED, WITH DELAYED HEALING, SUBSEQUENT ENCOUNTER: ICD-10-CM

## 2025-02-20 PROCEDURE — 99309 SBSQ NF CARE MODERATE MDM 30: CPT | Performed by: INTERNAL MEDICINE
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
## 2025-02-21 ENCOUNTER — TELEPHONE (OUTPATIENT)
Dept: SURGERY | Facility: CLINIC | Age: 70
End: 2025-02-21

## 2025-02-21 NOTE — TELEPHONE ENCOUNTER
Pt is awaiting call from rep to be fitted for new brace.Pt states Bj said they would call pt today or tomorrow . Pt concerned if they do not call that wife will have to wait til next week. Pt has been the wrong brace for weeks (per Bj) and is reason why has only partially healed. Pt would like # to rep to f/u.

## 2025-02-21 NOTE — TELEPHONE ENCOUNTER
Message below noted.    Patient  looking for number for rep for collar. No orders for collar noted in chart.    LOV 2/20/25- note not yet complete.    Routed to Provider.

## 2025-02-21 NOTE — TELEPHONE ENCOUNTER
Reached out to Niall Mendoza of [facility]. Plan was to replace patient's current cervical collar as it appeared to be too big for her. I explained to the patient and her  that I cannot make any definitive comment on the status of the fracture from December to February as I only have the February CT cervical to look at. The patient's  is obtaining the original imaging from [facility] and will drop it off at the office. I provided him with a list of scans from [facility]where that would be beneficial to review. I received confirmation from Niall that he is aware of this patient and they will take care of her as soon as possible. Her collar, although bulky, appeared to stabilize her cervical spine appropriately in the office yesterday.

## 2025-02-21 NOTE — TELEPHONE ENCOUNTER
I spoke with Rakesh. It is [facility]. He is going to reach out to them to make sure that they have the fax request. The patient has a collar currently. This is to get a better fit.

## 2025-02-24 ENCOUNTER — EXTERNAL FACILITY (OUTPATIENT)
Dept: INTERNAL MEDICINE CLINIC | Facility: CLINIC | Age: 70
End: 2025-02-24

## 2025-02-24 DIAGNOSIS — S82.202G TIBIA/FIBULA FRACTURE, LEFT, CLOSED, WITH DELAYED HEALING, SUBSEQUENT ENCOUNTER: ICD-10-CM

## 2025-02-24 DIAGNOSIS — L08.9 INFECTED WOUND: ICD-10-CM
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
## 2025-02-24 DIAGNOSIS — Z87.81 HISTORY OF CERVICAL FRACTURE: ICD-10-CM

## 2025-02-24 DIAGNOSIS — S82.402G TIBIA/FIBULA FRACTURE, LEFT, CLOSED, WITH DELAYED HEALING, SUBSEQUENT ENCOUNTER: ICD-10-CM

## 2025-02-24 DIAGNOSIS — T14.8XXA INFECTED WOUND: ICD-10-CM

## 2025-02-24 DIAGNOSIS — Z87.81 S/P RIGHT HIP FRACTURE: ICD-10-CM

## 2025-02-24 NOTE — TELEPHONE ENCOUNTER
Message below noted.    Please see TE from 2/21/25.    Patient and family advised of information regarding collar.    Nothing needed further with this encounter.

## 2025-02-25 ENCOUNTER — SNF VISIT (OUTPATIENT)
Dept: INTERNAL MEDICINE CLINIC | Facility: SKILLED NURSING FACILITY | Age: 70
End: 2025-02-25

## 2025-02-25 DIAGNOSIS — S82.402G TIBIA/FIBULA FRACTURE, LEFT, CLOSED, WITH DELAYED HEALING, SUBSEQUENT ENCOUNTER: ICD-10-CM

## 2025-02-25 DIAGNOSIS — S82.202G TIBIA/FIBULA FRACTURE, LEFT, CLOSED, WITH DELAYED HEALING, SUBSEQUENT ENCOUNTER: ICD-10-CM

## 2025-02-25 DIAGNOSIS — I10 PRIMARY HYPERTENSION: ICD-10-CM

## 2025-02-25 DIAGNOSIS — I48.91 ATRIAL FIBRILLATION, UNSPECIFIED TYPE (HCC): Primary | ICD-10-CM
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
## 2025-02-25 PROCEDURE — 99310 SBSQ NF CARE HIGH MDM 45: CPT | Performed by: NURSE PRACTITIONER
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
## 2025-02-27 ENCOUNTER — EXTERNAL FACILITY (OUTPATIENT)
Dept: INTERNAL MEDICINE CLINIC | Facility: CLINIC | Age: 70
End: 2025-02-27

## 2025-02-27 DIAGNOSIS — S82.402G TIBIA/FIBULA FRACTURE, LEFT, CLOSED, WITH DELAYED HEALING, SUBSEQUENT ENCOUNTER: ICD-10-CM

## 2025-02-27 DIAGNOSIS — T14.8XXA INFECTED WOUND: ICD-10-CM

## 2025-02-27 DIAGNOSIS — Z87.81 HISTORY OF CERVICAL FRACTURE: ICD-10-CM

## 2025-02-27 DIAGNOSIS — Z87.81 S/P RIGHT HIP FRACTURE: ICD-10-CM
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
## 2025-02-27 DIAGNOSIS — L08.9 INFECTED WOUND: ICD-10-CM

## 2025-02-27 DIAGNOSIS — S82.202G TIBIA/FIBULA FRACTURE, LEFT, CLOSED, WITH DELAYED HEALING, SUBSEQUENT ENCOUNTER: ICD-10-CM

## 2025-02-28 ENCOUNTER — SNF VISIT (OUTPATIENT)
Dept: INTERNAL MEDICINE CLINIC | Facility: SKILLED NURSING FACILITY | Age: 70
End: 2025-02-28

## 2025-02-28 DIAGNOSIS — I10 PRIMARY HYPERTENSION: ICD-10-CM
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
## 2025-02-28 DIAGNOSIS — S72.001D CLOSED FRACTURE OF RIGHT HIP WITH ROUTINE HEALING, SUBSEQUENT ENCOUNTER: ICD-10-CM

## 2025-02-28 DIAGNOSIS — S82.202G TIBIA/FIBULA FRACTURE, LEFT, CLOSED, WITH DELAYED HEALING, SUBSEQUENT ENCOUNTER: ICD-10-CM

## 2025-02-28 DIAGNOSIS — S82.402G TIBIA/FIBULA FRACTURE, LEFT, CLOSED, WITH DELAYED HEALING, SUBSEQUENT ENCOUNTER: ICD-10-CM

## 2025-02-28 DIAGNOSIS — I48.91 ATRIAL FIBRILLATION, UNSPECIFIED TYPE (HCC): Primary | ICD-10-CM

## 2025-02-28 PROCEDURE — 99310 SBSQ NF CARE HIGH MDM 45: CPT | Performed by: NURSE PRACTITIONER
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
## 2025-03-03 ENCOUNTER — EXTERNAL FACILITY (OUTPATIENT)
Dept: INTERNAL MEDICINE CLINIC | Facility: CLINIC | Age: 70
End: 2025-03-03

## 2025-03-03 DIAGNOSIS — L08.9 INFECTED WOUND: ICD-10-CM

## 2025-03-03 DIAGNOSIS — Z87.81 S/P RIGHT HIP FRACTURE: ICD-10-CM

## 2025-03-03 DIAGNOSIS — Z87.81 HISTORY OF CERVICAL FRACTURE: ICD-10-CM

## 2025-03-03 DIAGNOSIS — S82.202G TIBIA/FIBULA FRACTURE, LEFT, CLOSED, WITH DELAYED HEALING, SUBSEQUENT ENCOUNTER: ICD-10-CM

## 2025-03-03 DIAGNOSIS — S82.402G TIBIA/FIBULA FRACTURE, LEFT, CLOSED, WITH DELAYED HEALING, SUBSEQUENT ENCOUNTER: ICD-10-CM
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
## 2025-03-03 DIAGNOSIS — T14.8XXA INFECTED WOUND: ICD-10-CM

## 2025-03-03 PROCEDURE — 99309 SBSQ NF CARE MODERATE MDM 30: CPT | Performed by: INTERNAL MEDICINE

## 2025-03-06 ENCOUNTER — SNF VISIT (OUTPATIENT)
Dept: INTERNAL MEDICINE CLINIC | Facility: SKILLED NURSING FACILITY | Age: 70
End: 2025-03-06

## 2025-03-06 DIAGNOSIS — I10 PRIMARY HYPERTENSION: ICD-10-CM

## 2025-03-06 DIAGNOSIS — S82.402G TIBIA/FIBULA FRACTURE, LEFT, CLOSED, WITH DELAYED HEALING, SUBSEQUENT ENCOUNTER: ICD-10-CM
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
## 2025-03-06 DIAGNOSIS — I48.91 ATRIAL FIBRILLATION, UNSPECIFIED TYPE (HCC): Primary | ICD-10-CM

## 2025-03-06 DIAGNOSIS — S82.202G TIBIA/FIBULA FRACTURE, LEFT, CLOSED, WITH DELAYED HEALING, SUBSEQUENT ENCOUNTER: ICD-10-CM
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
## 2025-03-07 ENCOUNTER — SNF VISIT (OUTPATIENT)
Dept: INTERNAL MEDICINE CLINIC | Facility: SKILLED NURSING FACILITY | Age: 70
End: 2025-03-07

## 2025-03-07 DIAGNOSIS — I48.91 ATRIAL FIBRILLATION, UNSPECIFIED TYPE (HCC): Primary | ICD-10-CM

## 2025-03-07 DIAGNOSIS — S82.202G TIBIA/FIBULA FRACTURE, LEFT, CLOSED, WITH DELAYED HEALING, SUBSEQUENT ENCOUNTER: ICD-10-CM

## 2025-03-07 DIAGNOSIS — S82.402G TIBIA/FIBULA FRACTURE, LEFT, CLOSED, WITH DELAYED HEALING, SUBSEQUENT ENCOUNTER: ICD-10-CM

## 2025-03-07 DIAGNOSIS — T14.8XXA INFECTED WOUND: ICD-10-CM

## 2025-03-07 DIAGNOSIS — L08.9 INFECTED WOUND: ICD-10-CM
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
## 2025-03-07 DIAGNOSIS — I10 PRIMARY HYPERTENSION: ICD-10-CM
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
## 2025-03-10 ENCOUNTER — EXTERNAL FACILITY (OUTPATIENT)
Dept: INTERNAL MEDICINE CLINIC | Facility: CLINIC | Age: 70
End: 2025-03-10

## 2025-03-10 DIAGNOSIS — T14.8XXA INFECTED WOUND: ICD-10-CM

## 2025-03-10 DIAGNOSIS — S82.202G TIBIA/FIBULA FRACTURE, LEFT, CLOSED, WITH DELAYED HEALING, SUBSEQUENT ENCOUNTER: ICD-10-CM

## 2025-03-10 DIAGNOSIS — L08.9 INFECTED WOUND: ICD-10-CM
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
## 2025-03-10 DIAGNOSIS — Z87.81 HISTORY OF CERVICAL FRACTURE: ICD-10-CM

## 2025-03-10 DIAGNOSIS — Z87.81 S/P RIGHT HIP FRACTURE: ICD-10-CM

## 2025-03-10 DIAGNOSIS — S82.402G TIBIA/FIBULA FRACTURE, LEFT, CLOSED, WITH DELAYED HEALING, SUBSEQUENT ENCOUNTER: ICD-10-CM

## 2025-03-10 PROCEDURE — 99309 SBSQ NF CARE MODERATE MDM 30: CPT | Performed by: INTERNAL MEDICINE

## 2025-03-11 ENCOUNTER — SNF VISIT (OUTPATIENT)
Dept: INTERNAL MEDICINE CLINIC | Facility: SKILLED NURSING FACILITY | Age: 70
End: 2025-03-11

## 2025-03-11 DIAGNOSIS — T14.8XXA INFECTED WOUND: ICD-10-CM

## 2025-03-11 DIAGNOSIS — S82.402G TIBIA/FIBULA FRACTURE, LEFT, CLOSED, WITH DELAYED HEALING, SUBSEQUENT ENCOUNTER: Primary | ICD-10-CM

## 2025-03-11 DIAGNOSIS — L08.9 INFECTED WOUND: ICD-10-CM

## 2025-03-11 DIAGNOSIS — S82.202G TIBIA/FIBULA FRACTURE, LEFT, CLOSED, WITH DELAYED HEALING, SUBSEQUENT ENCOUNTER: Primary | ICD-10-CM

## 2025-03-11 DIAGNOSIS — Z87.81 HISTORY OF CERVICAL FRACTURE: ICD-10-CM

## 2025-03-11 DIAGNOSIS — I10 PRIMARY HYPERTENSION: ICD-10-CM
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
## 2025-03-11 PROCEDURE — 99310 SBSQ NF CARE HIGH MDM 45: CPT | Performed by: NURSE PRACTITIONER
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
## 2025-03-12 ENCOUNTER — SNF VISIT (OUTPATIENT)
Dept: INTERNAL MEDICINE CLINIC | Facility: SKILLED NURSING FACILITY | Age: 70
End: 2025-03-12

## 2025-03-12 DIAGNOSIS — S82.202G TIBIA/FIBULA FRACTURE, LEFT, CLOSED, WITH DELAYED HEALING, SUBSEQUENT ENCOUNTER: Primary | ICD-10-CM

## 2025-03-12 DIAGNOSIS — R11.0 NAUSEA: ICD-10-CM

## 2025-03-12 DIAGNOSIS — S82.402G TIBIA/FIBULA FRACTURE, LEFT, CLOSED, WITH DELAYED HEALING, SUBSEQUENT ENCOUNTER: Primary | ICD-10-CM

## 2025-03-12 DIAGNOSIS — L08.9 INFECTED WOUND: ICD-10-CM

## 2025-03-12 DIAGNOSIS — I10 PRIMARY HYPERTENSION: ICD-10-CM
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
## 2025-03-12 DIAGNOSIS — T14.8XXA INFECTED WOUND: ICD-10-CM

## 2025-03-12 PROCEDURE — 99310 SBSQ NF CARE HIGH MDM 45: CPT | Performed by: NURSE PRACTITIONER
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
## 2025-03-13 ENCOUNTER — EXTERNAL FACILITY (OUTPATIENT)
Dept: INTERNAL MEDICINE CLINIC | Facility: CLINIC | Age: 70
End: 2025-03-13

## 2025-03-13 DIAGNOSIS — S82.402G TIBIA/FIBULA FRACTURE, LEFT, CLOSED, WITH DELAYED HEALING, SUBSEQUENT ENCOUNTER: ICD-10-CM
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
## 2025-03-13 DIAGNOSIS — E87.6 HYPOKALEMIA: ICD-10-CM

## 2025-03-13 DIAGNOSIS — S82.202G TIBIA/FIBULA FRACTURE, LEFT, CLOSED, WITH DELAYED HEALING, SUBSEQUENT ENCOUNTER: ICD-10-CM

## 2025-03-13 DIAGNOSIS — T14.8XXA INFECTED WOUND: ICD-10-CM

## 2025-03-13 DIAGNOSIS — L08.9 INFECTED WOUND: ICD-10-CM

## 2025-03-13 DIAGNOSIS — Z87.81 S/P RIGHT HIP FRACTURE: ICD-10-CM

## 2025-03-13 PROCEDURE — 99309 SBSQ NF CARE MODERATE MDM 30: CPT | Performed by: INTERNAL MEDICINE

## 2025-03-17 ENCOUNTER — EXTERNAL FACILITY (OUTPATIENT)
Dept: INTERNAL MEDICINE CLINIC | Facility: CLINIC | Age: 70
End: 2025-03-17

## 2025-03-17 DIAGNOSIS — S82.402G TIBIA/FIBULA FRACTURE, LEFT, CLOSED, WITH DELAYED HEALING, SUBSEQUENT ENCOUNTER: ICD-10-CM

## 2025-03-17 DIAGNOSIS — R11.0 NAUSEA: ICD-10-CM

## 2025-03-17 DIAGNOSIS — T14.8XXA INFECTED WOUND: ICD-10-CM

## 2025-03-17 DIAGNOSIS — L08.9 INFECTED WOUND: ICD-10-CM
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
## 2025-03-17 DIAGNOSIS — S82.202G TIBIA/FIBULA FRACTURE, LEFT, CLOSED, WITH DELAYED HEALING, SUBSEQUENT ENCOUNTER: ICD-10-CM

## 2025-03-17 DIAGNOSIS — Z87.81 S/P RIGHT HIP FRACTURE: ICD-10-CM

## 2025-03-17 DIAGNOSIS — E87.6 HYPOKALEMIA: ICD-10-CM

## 2025-03-17 DIAGNOSIS — N63.0 MASS OF BREAST, UNSPECIFIED LATERALITY: ICD-10-CM

## 2025-03-17 DIAGNOSIS — K59.00 CONSTIPATION, UNSPECIFIED CONSTIPATION TYPE: ICD-10-CM
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
## 2025-03-19 ENCOUNTER — SNF VISIT (OUTPATIENT)
Dept: INTERNAL MEDICINE CLINIC | Facility: SKILLED NURSING FACILITY | Age: 70
End: 2025-03-19
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
## 2025-03-19 DIAGNOSIS — S82.202G TIBIA/FIBULA FRACTURE, LEFT, CLOSED, WITH DELAYED HEALING, SUBSEQUENT ENCOUNTER: ICD-10-CM

## 2025-03-19 DIAGNOSIS — L08.9 INFECTED WOUND: ICD-10-CM

## 2025-03-19 DIAGNOSIS — R11.0 NAUSEA: Primary | ICD-10-CM

## 2025-03-19 DIAGNOSIS — S82.402G TIBIA/FIBULA FRACTURE, LEFT, CLOSED, WITH DELAYED HEALING, SUBSEQUENT ENCOUNTER: ICD-10-CM

## 2025-03-19 DIAGNOSIS — T14.8XXA INFECTED WOUND: ICD-10-CM

## 2025-03-19 PROCEDURE — 99310 SBSQ NF CARE HIGH MDM 45: CPT | Performed by: NURSE PRACTITIONER
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
## 2025-03-20 ENCOUNTER — SNF VISIT (OUTPATIENT)
Dept: INTERNAL MEDICINE CLINIC | Facility: SKILLED NURSING FACILITY | Age: 70
End: 2025-03-20

## 2025-03-20 DIAGNOSIS — I10 PRIMARY HYPERTENSION: ICD-10-CM
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
## 2025-03-20 DIAGNOSIS — I48.91 ATRIAL FIBRILLATION, UNSPECIFIED TYPE (HCC): Primary | ICD-10-CM

## 2025-03-20 DIAGNOSIS — K59.00 CONSTIPATION, UNSPECIFIED CONSTIPATION TYPE: ICD-10-CM

## 2025-03-20 DIAGNOSIS — S82.402G TIBIA/FIBULA FRACTURE, LEFT, CLOSED, WITH DELAYED HEALING, SUBSEQUENT ENCOUNTER: ICD-10-CM

## 2025-03-20 DIAGNOSIS — S82.202G TIBIA/FIBULA FRACTURE, LEFT, CLOSED, WITH DELAYED HEALING, SUBSEQUENT ENCOUNTER: ICD-10-CM

## 2025-03-20 PROCEDURE — 99310 SBSQ NF CARE HIGH MDM 45: CPT | Performed by: NURSE PRACTITIONER
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
## 2025-03-24 ENCOUNTER — EXTERNAL FACILITY (OUTPATIENT)
Dept: INTERNAL MEDICINE CLINIC | Facility: CLINIC | Age: 70
End: 2025-03-24

## 2025-03-24 DIAGNOSIS — E87.6 HYPOKALEMIA: ICD-10-CM

## 2025-03-24 DIAGNOSIS — K59.00 CONSTIPATION, UNSPECIFIED CONSTIPATION TYPE: ICD-10-CM

## 2025-03-24 DIAGNOSIS — T14.8XXA INFECTED WOUND: ICD-10-CM

## 2025-03-24 DIAGNOSIS — Z87.81 S/P RIGHT HIP FRACTURE: ICD-10-CM

## 2025-03-24 DIAGNOSIS — L08.9 INFECTED WOUND: ICD-10-CM

## 2025-03-24 DIAGNOSIS — R11.0 NAUSEA: ICD-10-CM
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
## 2025-03-24 DIAGNOSIS — S82.202G TIBIA/FIBULA FRACTURE, LEFT, CLOSED, WITH DELAYED HEALING, SUBSEQUENT ENCOUNTER: ICD-10-CM

## 2025-03-24 DIAGNOSIS — S82.402G TIBIA/FIBULA FRACTURE, LEFT, CLOSED, WITH DELAYED HEALING, SUBSEQUENT ENCOUNTER: ICD-10-CM

## 2025-03-24 PROCEDURE — 99309 SBSQ NF CARE MODERATE MDM 30: CPT | Performed by: INTERNAL MEDICINE
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
## 2025-03-25 ENCOUNTER — SNF VISIT (OUTPATIENT)
Dept: INTERNAL MEDICINE CLINIC | Facility: SKILLED NURSING FACILITY | Age: 70
End: 2025-03-25

## 2025-03-25 DIAGNOSIS — S82.202G TIBIA/FIBULA FRACTURE, LEFT, CLOSED, WITH DELAYED HEALING, SUBSEQUENT ENCOUNTER: ICD-10-CM

## 2025-03-25 DIAGNOSIS — I10 PRIMARY HYPERTENSION: ICD-10-CM

## 2025-03-25 DIAGNOSIS — I48.91 ATRIAL FIBRILLATION, UNSPECIFIED TYPE (HCC): ICD-10-CM
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
## 2025-03-25 DIAGNOSIS — S82.402G TIBIA/FIBULA FRACTURE, LEFT, CLOSED, WITH DELAYED HEALING, SUBSEQUENT ENCOUNTER: ICD-10-CM

## 2025-03-25 DIAGNOSIS — R11.0 NAUSEA: ICD-10-CM

## 2025-03-25 DIAGNOSIS — Z87.81 HISTORY OF CERVICAL FRACTURE: ICD-10-CM

## 2025-03-25 PROCEDURE — 99310 SBSQ NF CARE HIGH MDM 45: CPT | Performed by: NURSE PRACTITIONER
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
## 2025-03-26 ENCOUNTER — TELEPHONE (OUTPATIENT)
Dept: SURGERY | Facility: CLINIC | Age: 70
End: 2025-03-26

## 2025-03-26 NOTE — TELEPHONE ENCOUNTER
[place] office received imaging discs from [facility] for Bj Reyes PA-C, to review.     12/27/2024 - CT Head or Brain WO Contrast   12/28/2024 - CT Angio Neck WWO Contrast  12/28/2024 - CT Cervical Spine WO Contrast     Films uploaded to PACS.  Discs will be returned to the patient at her appointment with Bj Reyes PA-C , on 03/27/2025.

## 2025-03-27 ENCOUNTER — HOSPITAL ENCOUNTER (OUTPATIENT)
Dept: GENERAL RADIOLOGY | Age: 70
Discharge: HOME | End: 2025-03-27
Attending: STUDENT IN AN ORGANIZED HEALTH CARE EDUCATION/TRAINING PROGRAM
Payer: MEDICARE

## 2025-03-27 ENCOUNTER — TELEPHONE (OUTPATIENT)
Dept: SURGERY | Facility: CLINIC | Age: 70
End: 2025-03-27

## 2025-03-27 ENCOUNTER — HOSPITAL ENCOUNTER (OUTPATIENT)
Dept: GENERAL RADIOLOGY | Facility: HOSPITAL | Age: 70
Discharge: HOME | End: 2025-03-27
Attending: STUDENT IN AN ORGANIZED HEALTH CARE EDUCATION/TRAINING PROGRAM
Payer: MEDICARE

## 2025-03-27 ENCOUNTER — HOSPITAL ENCOUNTER (OUTPATIENT)
Dept: CT IMAGING | Facility: HOSPITAL | Age: 70
Discharge: HOME | End: 2025-03-27
Attending: STUDENT IN AN ORGANIZED HEALTH CARE EDUCATION/TRAINING PROGRAM
Payer: MEDICARE

## 2025-03-27 ENCOUNTER — OFFICE VISIT (OUTPATIENT)
Dept: SURGERY | Facility: CLINIC | Age: 70
End: 2025-03-27
Payer: MEDICARE

## 2025-03-27 VITALS
SYSTOLIC BLOOD PRESSURE: 108 MMHG | HEIGHT: 60 IN | OXYGEN SATURATION: 99 % | DIASTOLIC BLOOD PRESSURE: 75 MMHG | HEART RATE: 88 BPM | BODY MASS INDEX: 50.26 KG/M2 | WEIGHT: 256 LBS

## 2025-03-27 DIAGNOSIS — S12.191D OTHER CLOSED NONDISPLACED FRACTURE OF SECOND CERVICAL VERTEBRA WITH ROUTINE HEALING, SUBSEQUENT ENCOUNTER: Primary | ICD-10-CM

## 2025-03-27 DIAGNOSIS — S12.190A OTHER CLOSED DISPLACED FRACTURE OF SECOND CERVICAL VERTEBRA, INITIAL ENCOUNTER (HCC): ICD-10-CM

## 2025-03-27 DIAGNOSIS — S12.191D OTHER CLOSED NONDISPLACED FRACTURE OF SECOND CERVICAL VERTEBRA WITH ROUTINE HEALING, SUBSEQUENT ENCOUNTER: ICD-10-CM

## 2025-03-27 PROCEDURE — 72052 X-RAY EXAM NECK SPINE 6/>VWS: CPT | Performed by: STUDENT IN AN ORGANIZED HEALTH CARE EDUCATION/TRAINING PROGRAM

## 2025-03-27 PROCEDURE — 99214 OFFICE O/P EST MOD 30 MIN: CPT | Performed by: STUDENT IN AN ORGANIZED HEALTH CARE EDUCATION/TRAINING PROGRAM

## 2025-03-27 PROCEDURE — 72125 CT NECK SPINE W/O DYE: CPT | Performed by: STUDENT IN AN ORGANIZED HEALTH CARE EDUCATION/TRAINING PROGRAM

## 2025-03-27 NOTE — TELEPHONE ENCOUNTER
Pt's spouse states pt completed xray after ov today and is awaiting call to advise if brace can be taken off. Nurse Debbi S aware patient is awaiting call.

## 2025-03-27 NOTE — TELEPHONE ENCOUNTER
Called patient's , Dong, but sent to voicemail.  Verbal release reviewed.  Left a voicemail for Dong explaining that the flexion/extension x-rays do not demonstrate any instability of the fracture and that the patient is cleared to remove the collar.  Encouraged weaning out of the collar.  Explained that she can continue to use the collar as needed if it provides more comfort, but be aware that it may lead to more muscle stiffness and neck pain if she is not using her muscles independently.  Continued collar use could lead to skin breakdown and pressure ulcers as well.  Provided warning signs/symptoms that would warrant immediate evaluation in the ED, which includes, is not limited to, intractable neck pain, new weakness in her upper and lower extremities, or radicular pain, numbness, tingling in the upper and lower extremities.  Advised patient to call our office back if he had any questions or concerns.  Explained that I will be out of office starting tomorrow and all of next week, but that one of our other providers can address their questions if needed.

## 2025-03-27 NOTE — PROGRESS NOTES
Addended by: AD ALARCON on: 6/11/2021 09:56 AM     Modules accepted: Orders     Patient is following up for C2 fracture. Patient completed updated CT scan this morning. Patient is doing well. Collar was changed 2/22/25 to a better fit and patient states this feels much better. Patient is at [place] and is beginning to walk again. Patient states she is not having much pain, more-so aching pains from PT yesterday.

## 2025-03-28 ENCOUNTER — SNF VISIT (OUTPATIENT)
Dept: INTERNAL MEDICINE CLINIC | Facility: SKILLED NURSING FACILITY | Age: 70
End: 2025-03-28

## 2025-03-28 DIAGNOSIS — I10 PRIMARY HYPERTENSION: ICD-10-CM
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
## 2025-03-28 DIAGNOSIS — S82.202G TIBIA/FIBULA FRACTURE, LEFT, CLOSED, WITH DELAYED HEALING, SUBSEQUENT ENCOUNTER: ICD-10-CM

## 2025-03-28 DIAGNOSIS — R19.5 FECAL OCCULT BLOOD TEST POSITIVE: Primary | ICD-10-CM

## 2025-03-28 DIAGNOSIS — S82.402G TIBIA/FIBULA FRACTURE, LEFT, CLOSED, WITH DELAYED HEALING, SUBSEQUENT ENCOUNTER: ICD-10-CM

## 2025-03-28 DIAGNOSIS — T14.8XXA INFECTED WOUND: ICD-10-CM

## 2025-03-28 DIAGNOSIS — L08.9 INFECTED WOUND: ICD-10-CM

## 2025-03-28 DIAGNOSIS — S12.101G CLOSED NONDISPLACED FRACTURE OF SECOND CERVICAL VERTEBRA WITH DELAYED HEALING, UNSPECIFIED FRACTURE MORPHOLOGY, SUBSEQUENT ENCOUNTER: ICD-10-CM

## 2025-03-28 DIAGNOSIS — I48.91 ATRIAL FIBRILLATION, UNSPECIFIED TYPE (HCC): ICD-10-CM

## 2025-03-28 PROCEDURE — 99310 SBSQ NF CARE HIGH MDM 45: CPT | Performed by: NURSE PRACTITIONER
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
## 2025-03-31 ENCOUNTER — PATIENT MESSAGE (OUTPATIENT)
Dept: INTERNAL MEDICINE CLINIC | Facility: CLINIC | Age: 70
End: 2025-03-31

## 2025-03-31 ENCOUNTER — EXTERNAL FACILITY (OUTPATIENT)
Dept: INTERNAL MEDICINE CLINIC | Facility: CLINIC | Age: 70
End: 2025-03-31

## 2025-03-31 DIAGNOSIS — L08.9 INFECTED WOUND: ICD-10-CM

## 2025-03-31 DIAGNOSIS — K59.00 CONSTIPATION, UNSPECIFIED CONSTIPATION TYPE: ICD-10-CM

## 2025-03-31 DIAGNOSIS — E87.6 HYPOKALEMIA: ICD-10-CM

## 2025-03-31 DIAGNOSIS — R11.0 NAUSEA: ICD-10-CM

## 2025-03-31 DIAGNOSIS — S82.202G TIBIA/FIBULA FRACTURE, LEFT, CLOSED, WITH DELAYED HEALING, SUBSEQUENT ENCOUNTER: ICD-10-CM

## 2025-03-31 DIAGNOSIS — T14.8XXA INFECTED WOUND: ICD-10-CM

## 2025-03-31 DIAGNOSIS — S82.402G TIBIA/FIBULA FRACTURE, LEFT, CLOSED, WITH DELAYED HEALING, SUBSEQUENT ENCOUNTER: ICD-10-CM

## 2025-03-31 DIAGNOSIS — Z87.81 S/P RIGHT HIP FRACTURE: ICD-10-CM
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
## 2025-04-01 ENCOUNTER — TELEPHONE (OUTPATIENT)
Facility: CLINIC | Age: 70
End: 2025-04-01

## 2025-04-01 NOTE — TELEPHONE ENCOUNTER
Patients spouse Bill calling for patient that has consult scheduled 4/17. Patients spouse asking if this could be a telehealth visit due to being in Rehab and would need to be brought in a stretcher. Patient is being seen for blood in stool. Please call at [phone],thanks.
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
## 2025-04-09 ENCOUNTER — SNF VISIT (OUTPATIENT)
Dept: INTERNAL MEDICINE CLINIC | Facility: SKILLED NURSING FACILITY | Age: 70
End: 2025-04-09

## 2025-04-09 DIAGNOSIS — Z86.79 HISTORY OF ATRIAL FIBRILLATION: Primary | ICD-10-CM

## 2025-04-09 DIAGNOSIS — K56.609 SMALL BOWEL OBSTRUCTION (HCC): ICD-10-CM

## 2025-04-09 DIAGNOSIS — S82.202G TIBIA/FIBULA FRACTURE, LEFT, CLOSED, WITH DELAYED HEALING, SUBSEQUENT ENCOUNTER: ICD-10-CM

## 2025-04-09 DIAGNOSIS — T14.8XXA INFECTED WOUND: ICD-10-CM

## 2025-04-09 DIAGNOSIS — I10 PRIMARY HYPERTENSION: ICD-10-CM

## 2025-04-09 DIAGNOSIS — L08.9 INFECTED WOUND: ICD-10-CM

## 2025-04-09 DIAGNOSIS — S82.402G TIBIA/FIBULA FRACTURE, LEFT, CLOSED, WITH DELAYED HEALING, SUBSEQUENT ENCOUNTER: ICD-10-CM
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
## 2025-04-09 PROCEDURE — 99310 SBSQ NF CARE HIGH MDM 45: CPT | Performed by: NURSE PRACTITIONER
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
## 2025-04-10 ENCOUNTER — EXTERNAL FACILITY (OUTPATIENT)
Dept: INTERNAL MEDICINE CLINIC | Facility: CLINIC | Age: 70
End: 2025-04-10

## 2025-04-10 DIAGNOSIS — L08.9 INFECTED WOUND: ICD-10-CM

## 2025-04-10 DIAGNOSIS — K59.00 CONSTIPATION, UNSPECIFIED CONSTIPATION TYPE: ICD-10-CM

## 2025-04-10 DIAGNOSIS — S82.402G TIBIA/FIBULA FRACTURE, LEFT, CLOSED, WITH DELAYED HEALING, SUBSEQUENT ENCOUNTER: ICD-10-CM

## 2025-04-10 DIAGNOSIS — Z87.81 S/P RIGHT HIP FRACTURE: ICD-10-CM

## 2025-04-10 DIAGNOSIS — S82.202G TIBIA/FIBULA FRACTURE, LEFT, CLOSED, WITH DELAYED HEALING, SUBSEQUENT ENCOUNTER: ICD-10-CM

## 2025-04-10 DIAGNOSIS — R11.0 NAUSEA: ICD-10-CM
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
## 2025-04-10 DIAGNOSIS — T14.8XXA INFECTED WOUND: ICD-10-CM

## 2025-04-11 ENCOUNTER — PATIENT MESSAGE (OUTPATIENT)
Facility: CLINIC | Age: 70
End: 2025-04-11

## 2025-04-14 ENCOUNTER — EXTERNAL FACILITY (OUTPATIENT)
Dept: INTERNAL MEDICINE CLINIC | Facility: CLINIC | Age: 70
End: 2025-04-14

## 2025-04-14 DIAGNOSIS — Z87.81 S/P RIGHT HIP FRACTURE: ICD-10-CM

## 2025-04-14 DIAGNOSIS — T14.8XXA INFECTED WOUND: ICD-10-CM
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
## 2025-04-14 DIAGNOSIS — S82.202G TIBIA/FIBULA FRACTURE, LEFT, CLOSED, WITH DELAYED HEALING, SUBSEQUENT ENCOUNTER: ICD-10-CM

## 2025-04-14 DIAGNOSIS — L08.9 INFECTED WOUND: ICD-10-CM

## 2025-04-14 DIAGNOSIS — L89.159 PRESSURE INJURY OF SKIN OF SACRAL REGION, UNSPECIFIED INJURY STAGE: ICD-10-CM

## 2025-04-14 DIAGNOSIS — S82.402G TIBIA/FIBULA FRACTURE, LEFT, CLOSED, WITH DELAYED HEALING, SUBSEQUENT ENCOUNTER: ICD-10-CM

## 2025-04-14 NOTE — TELEPHONE ENCOUNTER
Last cmp showed potassium of 3.2 on 3/19. If there is anything more recent which is in normal range it would be best fore scheduling colonoscopy.  Thank you  Mirna cordon

## 2025-04-16 ENCOUNTER — SNF VISIT (OUTPATIENT)
Dept: INTERNAL MEDICINE CLINIC | Facility: SKILLED NURSING FACILITY | Age: 70
End: 2025-04-16

## 2025-04-16 ENCOUNTER — TELEMEDICINE (OUTPATIENT)
Facility: CLINIC | Age: 70
End: 2025-04-16
Payer: MEDICARE

## 2025-04-16 DIAGNOSIS — S82.202G TIBIA/FIBULA FRACTURE, LEFT, CLOSED, WITH DELAYED HEALING, SUBSEQUENT ENCOUNTER: ICD-10-CM

## 2025-04-16 DIAGNOSIS — F41.9 ANXIETY: ICD-10-CM

## 2025-04-16 DIAGNOSIS — I48.91 ATRIAL FIBRILLATION, UNSPECIFIED TYPE (HCC): Primary | ICD-10-CM

## 2025-04-16 DIAGNOSIS — E87.6 HYPOKALEMIA: ICD-10-CM

## 2025-04-16 DIAGNOSIS — K59.00 CONSTIPATION, UNSPECIFIED CONSTIPATION TYPE: Primary | ICD-10-CM

## 2025-04-16 DIAGNOSIS — S82.402G TIBIA/FIBULA FRACTURE, LEFT, CLOSED, WITH DELAYED HEALING, SUBSEQUENT ENCOUNTER: ICD-10-CM

## 2025-04-16 DIAGNOSIS — R10.9 ABDOMINAL PAIN, UNSPECIFIED ABDOMINAL LOCATION: ICD-10-CM
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
## 2025-04-16 DIAGNOSIS — Z87.81 HISTORY OF CERVICAL FRACTURE: ICD-10-CM

## 2025-04-16 PROCEDURE — 99310 SBSQ NF CARE HIGH MDM 45: CPT | Performed by: NURSE PRACTITIONER

## 2025-04-16 PROCEDURE — 99214 OFFICE O/P EST MOD 30 MIN: CPT
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
## 2025-04-17 ENCOUNTER — EXTERNAL FACILITY (OUTPATIENT)
Dept: INTERNAL MEDICINE CLINIC | Facility: CLINIC | Age: 70
End: 2025-04-17
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
## 2025-04-17 DIAGNOSIS — S82.202G TIBIA/FIBULA FRACTURE, LEFT, CLOSED, WITH DELAYED HEALING, SUBSEQUENT ENCOUNTER: ICD-10-CM

## 2025-04-17 DIAGNOSIS — L08.9 INFECTED WOUND: ICD-10-CM

## 2025-04-17 DIAGNOSIS — Z87.81 S/P RIGHT HIP FRACTURE: ICD-10-CM

## 2025-04-17 DIAGNOSIS — S82.402G TIBIA/FIBULA FRACTURE, LEFT, CLOSED, WITH DELAYED HEALING, SUBSEQUENT ENCOUNTER: ICD-10-CM

## 2025-04-17 DIAGNOSIS — T14.8XXA INFECTED WOUND: ICD-10-CM

## 2025-04-17 PROCEDURE — 99309 SBSQ NF CARE MODERATE MDM 30: CPT | Performed by: INTERNAL MEDICINE

## 2025-04-18 ENCOUNTER — SNF VISIT (OUTPATIENT)
Dept: INTERNAL MEDICINE CLINIC | Facility: SKILLED NURSING FACILITY | Age: 70
End: 2025-04-18

## 2025-04-18 DIAGNOSIS — T14.8XXA INFECTED WOUND: ICD-10-CM

## 2025-04-18 DIAGNOSIS — I10 PRIMARY HYPERTENSION: ICD-10-CM
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
## 2025-04-18 DIAGNOSIS — S82.202G TIBIA/FIBULA FRACTURE, LEFT, CLOSED, WITH DELAYED HEALING, SUBSEQUENT ENCOUNTER: ICD-10-CM

## 2025-04-18 DIAGNOSIS — L08.9 INFECTED WOUND: ICD-10-CM

## 2025-04-18 DIAGNOSIS — S82.402G TIBIA/FIBULA FRACTURE, LEFT, CLOSED, WITH DELAYED HEALING, SUBSEQUENT ENCOUNTER: ICD-10-CM

## 2025-04-18 DIAGNOSIS — Z87.81 HISTORY OF CERVICAL FRACTURE: Primary | ICD-10-CM

## 2025-04-18 PROCEDURE — 99310 SBSQ NF CARE HIGH MDM 45: CPT | Performed by: NURSE PRACTITIONER
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
## 2025-04-21 ENCOUNTER — EXTERNAL FACILITY (OUTPATIENT)
Dept: INTERNAL MEDICINE CLINIC | Facility: CLINIC | Age: 70
End: 2025-04-21
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
## 2025-04-21 DIAGNOSIS — S82.402G TIBIA/FIBULA FRACTURE, LEFT, CLOSED, WITH DELAYED HEALING, SUBSEQUENT ENCOUNTER: ICD-10-CM

## 2025-04-21 DIAGNOSIS — L08.9 INFECTED WOUND: ICD-10-CM

## 2025-04-21 DIAGNOSIS — Z87.81 S/P RIGHT HIP FRACTURE: ICD-10-CM

## 2025-04-21 DIAGNOSIS — S82.202G TIBIA/FIBULA FRACTURE, LEFT, CLOSED, WITH DELAYED HEALING, SUBSEQUENT ENCOUNTER: ICD-10-CM

## 2025-04-21 DIAGNOSIS — T14.8XXA INFECTED WOUND: ICD-10-CM

## 2025-04-21 PROCEDURE — 99309 SBSQ NF CARE MODERATE MDM 30: CPT | Performed by: INTERNAL MEDICINE
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
## 2025-04-22 ENCOUNTER — SNF VISIT (OUTPATIENT)
Dept: INTERNAL MEDICINE CLINIC | Facility: SKILLED NURSING FACILITY | Age: 70
End: 2025-04-22

## 2025-04-22 ENCOUNTER — TELEPHONE (OUTPATIENT)
Facility: CLINIC | Age: 70
End: 2025-04-22

## 2025-04-22 DIAGNOSIS — T14.8XXA INFECTED WOUND: ICD-10-CM
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
## 2025-04-22 DIAGNOSIS — S82.202G TIBIA/FIBULA FRACTURE, LEFT, CLOSED, WITH DELAYED HEALING, SUBSEQUENT ENCOUNTER: ICD-10-CM

## 2025-04-22 DIAGNOSIS — S82.402G TIBIA/FIBULA FRACTURE, LEFT, CLOSED, WITH DELAYED HEALING, SUBSEQUENT ENCOUNTER: ICD-10-CM

## 2025-04-22 DIAGNOSIS — I10 PRIMARY HYPERTENSION: ICD-10-CM

## 2025-04-22 DIAGNOSIS — Z86.79 HISTORY OF ATRIAL FIBRILLATION: ICD-10-CM

## 2025-04-22 DIAGNOSIS — Z87.81 HISTORY OF CERVICAL FRACTURE: Primary | ICD-10-CM

## 2025-04-22 DIAGNOSIS — L08.9 INFECTED WOUND: ICD-10-CM

## 2025-04-22 PROCEDURE — 99310 SBSQ NF CARE HIGH MDM 45: CPT | Performed by: NURSE PRACTITIONER

## 2025-04-22 NOTE — TELEPHONE ENCOUNTER
Mirna,   Received records including labs from [facility] [place]. Placed on [facility] desk for review. Please send to scan once reviewed.   Thanks,  Daniela
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
## 2025-04-24 ENCOUNTER — EXTERNAL FACILITY (OUTPATIENT)
Dept: INTERNAL MEDICINE CLINIC | Facility: CLINIC | Age: 70
End: 2025-04-24

## 2025-04-24 DIAGNOSIS — L08.9 INFECTED WOUND: ICD-10-CM

## 2025-04-24 DIAGNOSIS — T14.8XXA INFECTED WOUND: ICD-10-CM

## 2025-04-24 DIAGNOSIS — S82.202G TIBIA/FIBULA FRACTURE, LEFT, CLOSED, WITH DELAYED HEALING, SUBSEQUENT ENCOUNTER: ICD-10-CM

## 2025-04-24 DIAGNOSIS — S82.402G TIBIA/FIBULA FRACTURE, LEFT, CLOSED, WITH DELAYED HEALING, SUBSEQUENT ENCOUNTER: ICD-10-CM
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
## 2025-04-24 DIAGNOSIS — Z87.81 S/P RIGHT HIP FRACTURE: ICD-10-CM

## 2025-04-24 PROCEDURE — 99309 SBSQ NF CARE MODERATE MDM 30: CPT | Performed by: INTERNAL MEDICINE

## 2025-04-25 ENCOUNTER — SNF VISIT (OUTPATIENT)
Dept: INTERNAL MEDICINE CLINIC | Facility: SKILLED NURSING FACILITY | Age: 70
End: 2025-04-25

## 2025-04-25 DIAGNOSIS — Z87.81 HISTORY OF CERVICAL FRACTURE: Primary | ICD-10-CM

## 2025-04-25 DIAGNOSIS — S82.402G TIBIA/FIBULA FRACTURE, LEFT, CLOSED, WITH DELAYED HEALING, SUBSEQUENT ENCOUNTER: ICD-10-CM

## 2025-04-25 DIAGNOSIS — L08.9 INFECTED WOUND: ICD-10-CM

## 2025-04-25 DIAGNOSIS — I10 PRIMARY HYPERTENSION: ICD-10-CM

## 2025-04-25 DIAGNOSIS — S82.202G TIBIA/FIBULA FRACTURE, LEFT, CLOSED, WITH DELAYED HEALING, SUBSEQUENT ENCOUNTER: ICD-10-CM

## 2025-04-25 DIAGNOSIS — T14.8XXA INFECTED WOUND: ICD-10-CM

## 2025-04-25 DIAGNOSIS — I87.2 VENOUS INSUFFICIENCY: ICD-10-CM

## 2025-04-25 PROCEDURE — 99310 SBSQ NF CARE HIGH MDM 45: CPT | Performed by: NURSE PRACTITIONER
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
## 2025-04-28 ENCOUNTER — EXTERNAL FACILITY (OUTPATIENT)
Dept: INTERNAL MEDICINE CLINIC | Facility: CLINIC | Age: 70
End: 2025-04-28

## 2025-04-28 DIAGNOSIS — S82.202G TIBIA/FIBULA FRACTURE, LEFT, CLOSED, WITH DELAYED HEALING, SUBSEQUENT ENCOUNTER: ICD-10-CM

## 2025-04-28 DIAGNOSIS — L08.9 INFECTED WOUND: ICD-10-CM

## 2025-04-28 DIAGNOSIS — Z87.81 S/P RIGHT HIP FRACTURE: ICD-10-CM

## 2025-04-28 DIAGNOSIS — T14.8XXA INFECTED WOUND: ICD-10-CM
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
## 2025-04-28 DIAGNOSIS — A49.02 MRSA (METHICILLIN RESISTANT STAPHYLOCOCCUS AUREUS): ICD-10-CM

## 2025-04-28 DIAGNOSIS — S82.402G TIBIA/FIBULA FRACTURE, LEFT, CLOSED, WITH DELAYED HEALING, SUBSEQUENT ENCOUNTER: ICD-10-CM

## 2025-04-28 PROCEDURE — 99309 SBSQ NF CARE MODERATE MDM 30: CPT | Performed by: INTERNAL MEDICINE

## 2025-04-30 ENCOUNTER — SNF VISIT (OUTPATIENT)
Dept: INTERNAL MEDICINE CLINIC | Facility: SKILLED NURSING FACILITY | Age: 70
End: 2025-04-30
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
## 2025-04-30 DIAGNOSIS — S82.402G TIBIA/FIBULA FRACTURE, LEFT, CLOSED, WITH DELAYED HEALING, SUBSEQUENT ENCOUNTER: ICD-10-CM
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
## 2025-04-30 DIAGNOSIS — Z87.81 HISTORY OF CERVICAL FRACTURE: Primary | ICD-10-CM

## 2025-04-30 DIAGNOSIS — F41.9 ANXIETY: ICD-10-CM

## 2025-04-30 DIAGNOSIS — S82.202G TIBIA/FIBULA FRACTURE, LEFT, CLOSED, WITH DELAYED HEALING, SUBSEQUENT ENCOUNTER: ICD-10-CM

## 2025-04-30 DIAGNOSIS — I10 PRIMARY HYPERTENSION: ICD-10-CM

## 2025-04-30 PROCEDURE — 99310 SBSQ NF CARE HIGH MDM 45: CPT | Performed by: NURSE PRACTITIONER
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
## 2025-05-01 ENCOUNTER — EXTERNAL FACILITY (OUTPATIENT)
Dept: INTERNAL MEDICINE CLINIC | Facility: CLINIC | Age: 70
End: 2025-05-01
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
## 2025-05-01 DIAGNOSIS — S82.202G TIBIA/FIBULA FRACTURE, LEFT, CLOSED, WITH DELAYED HEALING, SUBSEQUENT ENCOUNTER: ICD-10-CM

## 2025-05-01 DIAGNOSIS — T14.8XXA INFECTED WOUND: ICD-10-CM

## 2025-05-01 DIAGNOSIS — L08.9 INFECTED WOUND: ICD-10-CM

## 2025-05-01 DIAGNOSIS — Z87.81 S/P RIGHT HIP FRACTURE: ICD-10-CM

## 2025-05-01 DIAGNOSIS — S82.402G TIBIA/FIBULA FRACTURE, LEFT, CLOSED, WITH DELAYED HEALING, SUBSEQUENT ENCOUNTER: ICD-10-CM

## 2025-05-01 DIAGNOSIS — A49.02 MRSA (METHICILLIN RESISTANT STAPHYLOCOCCUS AUREUS): ICD-10-CM

## 2025-05-01 PROCEDURE — 99309 SBSQ NF CARE MODERATE MDM 30: CPT | Performed by: INTERNAL MEDICINE
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
## 2025-05-02 ENCOUNTER — SNF DISCHARGE (OUTPATIENT)
Dept: INTERNAL MEDICINE CLINIC | Facility: SKILLED NURSING FACILITY | Age: 70
End: 2025-05-02

## 2025-05-02 DIAGNOSIS — L08.9 INFECTED WOUND: ICD-10-CM

## 2025-05-02 DIAGNOSIS — Z87.81 HISTORY OF CERVICAL FRACTURE: Primary | ICD-10-CM
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
## 2025-05-02 DIAGNOSIS — I10 PRIMARY HYPERTENSION: ICD-10-CM

## 2025-05-02 DIAGNOSIS — S82.202G TIBIA/FIBULA FRACTURE, LEFT, CLOSED, WITH DELAYED HEALING, SUBSEQUENT ENCOUNTER: ICD-10-CM
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
## 2025-05-02 DIAGNOSIS — T14.8XXA INFECTED WOUND: ICD-10-CM

## 2025-05-02 DIAGNOSIS — S82.402G TIBIA/FIBULA FRACTURE, LEFT, CLOSED, WITH DELAYED HEALING, SUBSEQUENT ENCOUNTER: ICD-10-CM
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
## 2025-05-05 ENCOUNTER — EXTERNAL FACILITY (OUTPATIENT)
Dept: INTERNAL MEDICINE CLINIC | Facility: CLINIC | Age: 70
End: 2025-05-05

## 2025-05-05 DIAGNOSIS — S82.402G TIBIA/FIBULA FRACTURE, LEFT, CLOSED, WITH DELAYED HEALING, SUBSEQUENT ENCOUNTER: ICD-10-CM
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
## 2025-05-05 DIAGNOSIS — S82.202G TIBIA/FIBULA FRACTURE, LEFT, CLOSED, WITH DELAYED HEALING, SUBSEQUENT ENCOUNTER: ICD-10-CM

## 2025-05-05 DIAGNOSIS — A49.02 MRSA (METHICILLIN RESISTANT STAPHYLOCOCCUS AUREUS): ICD-10-CM

## 2025-05-05 DIAGNOSIS — T14.8XXA INFECTED WOUND: ICD-10-CM

## 2025-05-05 DIAGNOSIS — L08.9 INFECTED WOUND: ICD-10-CM

## 2025-05-05 DIAGNOSIS — Z87.81 S/P RIGHT HIP FRACTURE: ICD-10-CM

## 2025-05-05 PROCEDURE — 99309 SBSQ NF CARE MODERATE MDM 30: CPT | Performed by: INTERNAL MEDICINE

## 2025-05-08 ENCOUNTER — EXTERNAL FACILITY (OUTPATIENT)
Dept: INTERNAL MEDICINE CLINIC | Facility: CLINIC | Age: 70
End: 2025-05-08
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
## 2025-05-08 DIAGNOSIS — L08.9 INFECTED WOUND: ICD-10-CM

## 2025-05-08 DIAGNOSIS — S82.202G TIBIA/FIBULA FRACTURE, LEFT, CLOSED, WITH DELAYED HEALING, SUBSEQUENT ENCOUNTER: ICD-10-CM

## 2025-05-08 DIAGNOSIS — A49.02 MRSA (METHICILLIN RESISTANT STAPHYLOCOCCUS AUREUS): ICD-10-CM

## 2025-05-08 DIAGNOSIS — T14.8XXA INFECTED WOUND: ICD-10-CM

## 2025-05-08 DIAGNOSIS — Z87.81 S/P RIGHT HIP FRACTURE: ICD-10-CM

## 2025-05-08 DIAGNOSIS — S82.402G TIBIA/FIBULA FRACTURE, LEFT, CLOSED, WITH DELAYED HEALING, SUBSEQUENT ENCOUNTER: ICD-10-CM

## 2025-05-08 PROCEDURE — 99309 SBSQ NF CARE MODERATE MDM 30: CPT | Performed by: INTERNAL MEDICINE
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
## 2025-05-12 ENCOUNTER — EXTERNAL FACILITY (OUTPATIENT)
Dept: INTERNAL MEDICINE CLINIC | Facility: CLINIC | Age: 70
End: 2025-05-12

## 2025-05-12 DIAGNOSIS — Z87.81 S/P RIGHT HIP FRACTURE: ICD-10-CM

## 2025-05-12 DIAGNOSIS — S82.202G TIBIA/FIBULA FRACTURE, LEFT, CLOSED, WITH DELAYED HEALING, SUBSEQUENT ENCOUNTER: ICD-10-CM
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
## 2025-05-12 DIAGNOSIS — A49.02 MRSA (METHICILLIN RESISTANT STAPHYLOCOCCUS AUREUS): ICD-10-CM

## 2025-05-12 DIAGNOSIS — L08.9 INFECTED WOUND: ICD-10-CM

## 2025-05-12 DIAGNOSIS — S82.402G TIBIA/FIBULA FRACTURE, LEFT, CLOSED, WITH DELAYED HEALING, SUBSEQUENT ENCOUNTER: ICD-10-CM

## 2025-05-12 DIAGNOSIS — L89.159 PRESSURE INJURY OF SKIN OF SACRAL REGION, UNSPECIFIED INJURY STAGE: ICD-10-CM

## 2025-05-12 DIAGNOSIS — T14.8XXA INFECTED WOUND: ICD-10-CM

## 2025-05-12 PROCEDURE — 99309 SBSQ NF CARE MODERATE MDM 30: CPT | Performed by: INTERNAL MEDICINE

## 2025-05-15 ENCOUNTER — EXTERNAL FACILITY (OUTPATIENT)
Dept: INTERNAL MEDICINE CLINIC | Facility: CLINIC | Age: 70
End: 2025-05-15

## 2025-05-15 DIAGNOSIS — S82.402G TIBIA/FIBULA FRACTURE, LEFT, CLOSED, WITH DELAYED HEALING, SUBSEQUENT ENCOUNTER: ICD-10-CM

## 2025-05-15 DIAGNOSIS — S82.202G TIBIA/FIBULA FRACTURE, LEFT, CLOSED, WITH DELAYED HEALING, SUBSEQUENT ENCOUNTER: ICD-10-CM

## 2025-05-15 DIAGNOSIS — T14.8XXA INFECTED WOUND: ICD-10-CM

## 2025-05-15 DIAGNOSIS — A49.02 MRSA (METHICILLIN RESISTANT STAPHYLOCOCCUS AUREUS): ICD-10-CM

## 2025-05-15 DIAGNOSIS — Z87.81 S/P RIGHT HIP FRACTURE: ICD-10-CM
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
## 2025-05-15 DIAGNOSIS — L08.9 INFECTED WOUND: ICD-10-CM

## 2025-05-15 DIAGNOSIS — L89.159 PRESSURE INJURY OF SKIN OF SACRAL REGION, UNSPECIFIED INJURY STAGE: ICD-10-CM

## 2025-05-15 PROCEDURE — 99309 SBSQ NF CARE MODERATE MDM 30: CPT | Performed by: INTERNAL MEDICINE

## 2025-05-19 ENCOUNTER — EXTERNAL FACILITY (OUTPATIENT)
Dept: INTERNAL MEDICINE CLINIC | Facility: CLINIC | Age: 70
End: 2025-05-19

## 2025-05-19 DIAGNOSIS — L08.9 INFECTED WOUND: ICD-10-CM

## 2025-05-19 DIAGNOSIS — S82.202G TIBIA/FIBULA FRACTURE, LEFT, CLOSED, WITH DELAYED HEALING, SUBSEQUENT ENCOUNTER: ICD-10-CM

## 2025-05-19 DIAGNOSIS — T14.8XXA INFECTED WOUND: ICD-10-CM

## 2025-05-19 DIAGNOSIS — A49.02 MRSA (METHICILLIN RESISTANT STAPHYLOCOCCUS AUREUS): ICD-10-CM

## 2025-05-19 DIAGNOSIS — L89.159 PRESSURE INJURY OF SKIN OF SACRAL REGION, UNSPECIFIED INJURY STAGE: ICD-10-CM

## 2025-05-19 DIAGNOSIS — Z87.81 S/P RIGHT HIP FRACTURE: ICD-10-CM
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
## 2025-05-19 DIAGNOSIS — S82.402G TIBIA/FIBULA FRACTURE, LEFT, CLOSED, WITH DELAYED HEALING, SUBSEQUENT ENCOUNTER: ICD-10-CM

## 2025-05-19 PROCEDURE — 99309 SBSQ NF CARE MODERATE MDM 30: CPT | Performed by: INTERNAL MEDICINE
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
## 2025-05-22 ENCOUNTER — TELEPHONE (OUTPATIENT)
Dept: INTERNAL MEDICINE CLINIC | Facility: CLINIC | Age: 70
End: 2025-05-22

## 2025-05-22 DIAGNOSIS — I89.0 LYMPHEDEMA: Primary | ICD-10-CM

## 2025-05-22 NOTE — TELEPHONE ENCOUNTER
Lymphedema therapy order sent to the clinic for evaluation     FYI to Dr. Eleuterio Mtz, this is the patient of [facility] that I was looking at, is over with me at [place] but is on self-pay, considered an outpatient so I think an evaluation will not have a payer problem.  But I like if we can get her in for an initial consult in the next week or 2 she still has a mild wound on the left lower extremity, but we can start working with the right.  Her edema is down, things are pretty well-controlled, at this point in time is just the lymphedema that is giving her some trouble.  Feel free to communicate with me on my phone

## 2025-05-29 ENCOUNTER — EXTERNAL FACILITY (OUTPATIENT)
Dept: INTERNAL MEDICINE CLINIC | Facility: CLINIC | Age: 70
End: 2025-05-29

## 2025-05-29 DIAGNOSIS — A49.02 MRSA (METHICILLIN RESISTANT STAPHYLOCOCCUS AUREUS): ICD-10-CM

## 2025-05-29 DIAGNOSIS — L89.159 PRESSURE INJURY OF SKIN OF SACRAL REGION, UNSPECIFIED INJURY STAGE: ICD-10-CM

## 2025-05-29 DIAGNOSIS — T14.8XXA INFECTED WOUND: ICD-10-CM
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
## 2025-05-29 DIAGNOSIS — L08.9 INFECTED WOUND: ICD-10-CM

## 2025-05-29 DIAGNOSIS — F41.9 ANXIETY: ICD-10-CM

## 2025-05-29 DIAGNOSIS — S82.402G TIBIA/FIBULA FRACTURE, LEFT, CLOSED, WITH DELAYED HEALING, SUBSEQUENT ENCOUNTER: ICD-10-CM

## 2025-05-29 DIAGNOSIS — S82.202G TIBIA/FIBULA FRACTURE, LEFT, CLOSED, WITH DELAYED HEALING, SUBSEQUENT ENCOUNTER: ICD-10-CM

## 2025-05-29 DIAGNOSIS — I89.0 LYMPHEDEMA: ICD-10-CM

## 2025-05-29 DIAGNOSIS — Z87.81 S/P RIGHT HIP FRACTURE: ICD-10-CM

## 2025-06-02 ENCOUNTER — EXTERNAL FACILITY (OUTPATIENT)
Dept: INTERNAL MEDICINE CLINIC | Facility: CLINIC | Age: 70
End: 2025-06-02

## 2025-06-02 DIAGNOSIS — F41.9 ANXIETY: ICD-10-CM

## 2025-06-02 DIAGNOSIS — T14.8XXA INFECTED WOUND: ICD-10-CM

## 2025-06-02 DIAGNOSIS — S82.402G TIBIA/FIBULA FRACTURE, LEFT, CLOSED, WITH DELAYED HEALING, SUBSEQUENT ENCOUNTER: ICD-10-CM

## 2025-06-02 DIAGNOSIS — Z87.81 S/P RIGHT HIP FRACTURE: ICD-10-CM

## 2025-06-02 DIAGNOSIS — L89.159 PRESSURE INJURY OF SKIN OF SACRAL REGION, UNSPECIFIED INJURY STAGE: ICD-10-CM

## 2025-06-02 DIAGNOSIS — A49.02 MRSA (METHICILLIN RESISTANT STAPHYLOCOCCUS AUREUS): ICD-10-CM
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
## 2025-06-02 DIAGNOSIS — L08.9 INFECTED WOUND: ICD-10-CM

## 2025-06-02 DIAGNOSIS — I89.0 LYMPHEDEMA: ICD-10-CM

## 2025-06-02 DIAGNOSIS — S82.202G TIBIA/FIBULA FRACTURE, LEFT, CLOSED, WITH DELAYED HEALING, SUBSEQUENT ENCOUNTER: ICD-10-CM

## 2025-06-02 PROCEDURE — 99309 SBSQ NF CARE MODERATE MDM 30: CPT | Performed by: INTERNAL MEDICINE

## 2025-06-02 RX ORDER — SPIRONOLACTONE 50 MG/1
50 TABLET, FILM COATED ORAL DAILY
Qty: 30 TABLET | Refills: 2 | Status: SHIPPED | OUTPATIENT
Start: 2025-06-02

## 2025-06-02 RX ORDER — CEFPODOXIME PROXETIL 200 MG/1
200 TABLET, FILM COATED ORAL 2 TIMES DAILY
COMMUNITY
End: 2025-06-02

## 2025-06-02 RX ORDER — ALPRAZOLAM 0.25 MG
0.25 TABLET ORAL EVERY 6 HOURS PRN
Qty: 60 TABLET | Refills: 1 | Status: SHIPPED | OUTPATIENT
Start: 2025-06-02

## 2025-06-02 RX ORDER — GABAPENTIN 100 MG/1
1 CAPSULE ORAL 3 TIMES DAILY
COMMUNITY
End: 2025-06-02

## 2025-06-02 RX ORDER — HYDROCODONE BITARTRATE AND ACETAMINOPHEN 5; 325 MG/1; MG/1
1-2 TABLET ORAL EVERY 6 HOURS PRN
Qty: 120 TABLET | Refills: 0 | Status: SHIPPED | OUTPATIENT
Start: 2025-06-02

## 2025-06-02 RX ORDER — HYDROCODONE BITARTRATE AND ACETAMINOPHEN 5; 325 MG/1; MG/1
1 TABLET ORAL EVERY 6 HOURS PRN
COMMUNITY
Start: 2025-02-04 | End: 2025-06-02

## 2025-06-02 RX ORDER — METHOCARBAMOL 500 MG/1
500 TABLET, FILM COATED ORAL 2 TIMES DAILY
Qty: 60 TABLET | Refills: 2 | Status: SHIPPED | OUTPATIENT
Start: 2025-06-02

## 2025-06-02 RX ORDER — METHOCARBAMOL 500 MG/1
500 TABLET, FILM COATED ORAL 2 TIMES DAILY
COMMUNITY
Start: 2025-05-28 | End: 2025-06-02

## 2025-06-02 RX ORDER — TRAMADOL HYDROCHLORIDE 50 MG/1
TABLET ORAL
COMMUNITY
Start: 2025-01-06 | End: 2025-06-02

## 2025-06-02 RX ORDER — SPIRONOLACTONE 50 MG/1
TABLET, FILM COATED ORAL
COMMUNITY
Start: 2025-05-19 | End: 2025-06-02

## 2025-06-02 RX ORDER — ALPRAZOLAM 0.25 MG
TABLET ORAL
COMMUNITY
End: 2025-06-02

## 2025-06-02 RX ORDER — TRAMADOL HYDROCHLORIDE 50 MG/1
50 TABLET ORAL EVERY 4 HOURS PRN
Qty: 180 TABLET | Refills: 0 | Status: SHIPPED | OUTPATIENT
Start: 2025-06-02

## 2025-06-02 RX ORDER — GABAPENTIN 100 MG/1
100 CAPSULE ORAL 3 TIMES DAILY
Qty: 90 CAPSULE | Refills: 2 | Status: SHIPPED | OUTPATIENT
Start: 2025-06-02

## 2025-06-02 RX ORDER — CEFPODOXIME PROXETIL 200 MG/1
200 TABLET, FILM COATED ORAL 2 TIMES DAILY
Qty: 60 TABLET | Refills: 2 | Status: SHIPPED | OUTPATIENT
Start: 2025-06-02

## 2025-06-05 ENCOUNTER — EXTERNAL FACILITY (OUTPATIENT)
Dept: INTERNAL MEDICINE CLINIC | Facility: CLINIC | Age: 70
End: 2025-06-05

## 2025-06-05 DIAGNOSIS — F41.9 ANXIETY: ICD-10-CM
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
## 2025-06-05 DIAGNOSIS — I89.0 LYMPHEDEMA: ICD-10-CM

## 2025-06-05 DIAGNOSIS — Z87.81 S/P RIGHT HIP FRACTURE: ICD-10-CM

## 2025-06-05 DIAGNOSIS — T14.8XXA INFECTED WOUND: ICD-10-CM

## 2025-06-05 DIAGNOSIS — L89.159 PRESSURE INJURY OF SKIN OF SACRAL REGION, UNSPECIFIED INJURY STAGE: ICD-10-CM

## 2025-06-05 DIAGNOSIS — S82.202G TIBIA/FIBULA FRACTURE, LEFT, CLOSED, WITH DELAYED HEALING, SUBSEQUENT ENCOUNTER: ICD-10-CM

## 2025-06-05 DIAGNOSIS — A49.02 MRSA (METHICILLIN RESISTANT STAPHYLOCOCCUS AUREUS): ICD-10-CM

## 2025-06-05 DIAGNOSIS — S82.402G TIBIA/FIBULA FRACTURE, LEFT, CLOSED, WITH DELAYED HEALING, SUBSEQUENT ENCOUNTER: ICD-10-CM

## 2025-06-05 DIAGNOSIS — L08.9 INFECTED WOUND: ICD-10-CM

## 2025-06-05 PROCEDURE — 99309 SBSQ NF CARE MODERATE MDM 30: CPT | Performed by: INTERNAL MEDICINE
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
## 2025-06-09 ENCOUNTER — EXTERNAL FACILITY (OUTPATIENT)
Dept: INTERNAL MEDICINE CLINIC | Facility: CLINIC | Age: 70
End: 2025-06-09

## 2025-06-09 DIAGNOSIS — S82.202G TIBIA/FIBULA FRACTURE, LEFT, CLOSED, WITH DELAYED HEALING, SUBSEQUENT ENCOUNTER: ICD-10-CM

## 2025-06-09 DIAGNOSIS — L89.159 PRESSURE INJURY OF SKIN OF SACRAL REGION, UNSPECIFIED INJURY STAGE: ICD-10-CM

## 2025-06-09 DIAGNOSIS — T14.8XXA INFECTED WOUND: ICD-10-CM

## 2025-06-09 DIAGNOSIS — A49.02 MRSA (METHICILLIN RESISTANT STAPHYLOCOCCUS AUREUS): ICD-10-CM

## 2025-06-09 DIAGNOSIS — Z87.81 S/P RIGHT HIP FRACTURE: ICD-10-CM
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
## 2025-06-09 DIAGNOSIS — F41.9 ANXIETY: ICD-10-CM

## 2025-06-09 DIAGNOSIS — S82.402G TIBIA/FIBULA FRACTURE, LEFT, CLOSED, WITH DELAYED HEALING, SUBSEQUENT ENCOUNTER: ICD-10-CM

## 2025-06-09 DIAGNOSIS — I89.0 LYMPHEDEMA: ICD-10-CM

## 2025-06-09 DIAGNOSIS — L08.9 INFECTED WOUND: ICD-10-CM

## 2025-06-09 PROCEDURE — 99309 SBSQ NF CARE MODERATE MDM 30: CPT | Performed by: INTERNAL MEDICINE

## 2025-06-12 ENCOUNTER — TELEPHONE (OUTPATIENT)
Dept: INTERNAL MEDICINE CLINIC | Facility: CLINIC | Age: 70
End: 2025-06-12

## 2025-06-12 ENCOUNTER — EXTERNAL FACILITY (OUTPATIENT)
Dept: INTERNAL MEDICINE CLINIC | Facility: CLINIC | Age: 70
End: 2025-06-12

## 2025-06-12 DIAGNOSIS — L08.9 INFECTED WOUND: ICD-10-CM
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
## 2025-06-12 DIAGNOSIS — Z87.81 S/P RIGHT HIP FRACTURE: ICD-10-CM

## 2025-06-12 DIAGNOSIS — I89.0 LYMPHEDEMA: ICD-10-CM

## 2025-06-12 DIAGNOSIS — A49.02 MRSA (METHICILLIN RESISTANT STAPHYLOCOCCUS AUREUS): ICD-10-CM

## 2025-06-12 DIAGNOSIS — L89.159 PRESSURE INJURY OF SKIN OF SACRAL REGION, UNSPECIFIED INJURY STAGE: ICD-10-CM

## 2025-06-12 DIAGNOSIS — T14.8XXA INFECTED WOUND: ICD-10-CM

## 2025-06-12 DIAGNOSIS — F41.9 ANXIETY: ICD-10-CM

## 2025-06-12 DIAGNOSIS — S82.202G TIBIA/FIBULA FRACTURE, LEFT, CLOSED, WITH DELAYED HEALING, SUBSEQUENT ENCOUNTER: ICD-10-CM

## 2025-06-12 DIAGNOSIS — J98.01 BRONCHOSPASM: Primary | ICD-10-CM

## 2025-06-12 DIAGNOSIS — S82.402G TIBIA/FIBULA FRACTURE, LEFT, CLOSED, WITH DELAYED HEALING, SUBSEQUENT ENCOUNTER: ICD-10-CM

## 2025-06-12 PROCEDURE — 99309 SBSQ NF CARE MODERATE MDM 30: CPT | Performed by: INTERNAL MEDICINE

## 2025-06-12 RX ORDER — PREDNISONE 10 MG/1
TABLET ORAL
Qty: 18 TABLET | Refills: 0 | Status: SHIPPED | OUTPATIENT
Start: 2025-06-12 | End: 2025-06-21
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
## 2025-06-23 ENCOUNTER — TELEPHONE (OUTPATIENT)
Dept: INTERNAL MEDICINE CLINIC | Facility: CLINIC | Age: 70
End: 2025-06-23

## 2025-06-23 ENCOUNTER — EXTERNAL FACILITY (OUTPATIENT)
Dept: INTERNAL MEDICINE CLINIC | Facility: CLINIC | Age: 70
End: 2025-06-23

## 2025-06-23 DIAGNOSIS — A49.02 MRSA (METHICILLIN RESISTANT STAPHYLOCOCCUS AUREUS): ICD-10-CM

## 2025-06-23 DIAGNOSIS — T14.8XXA INFECTED WOUND: ICD-10-CM

## 2025-06-23 DIAGNOSIS — L08.9 INFECTED WOUND: ICD-10-CM

## 2025-06-23 DIAGNOSIS — Z87.81 S/P RIGHT HIP FRACTURE: ICD-10-CM

## 2025-06-23 DIAGNOSIS — I89.0 LYMPHEDEMA: ICD-10-CM
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
## 2025-06-23 DIAGNOSIS — J40 BRONCHITIS: ICD-10-CM

## 2025-06-23 PROCEDURE — 99309 SBSQ NF CARE MODERATE MDM 30: CPT | Performed by: INTERNAL MEDICINE

## 2025-06-23 RX ORDER — PREDNISONE 10 MG/1
TABLET ORAL
Qty: 18 TABLET | Refills: 0 | Status: SHIPPED | OUTPATIENT
Start: 2025-06-23 | End: 2025-07-02

## 2025-06-26 ENCOUNTER — EXTERNAL FACILITY (OUTPATIENT)
Dept: INTERNAL MEDICINE CLINIC | Facility: CLINIC | Age: 70
End: 2025-06-26

## 2025-06-26 DIAGNOSIS — Z87.81 S/P RIGHT HIP FRACTURE: ICD-10-CM
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
## 2025-06-26 DIAGNOSIS — L08.9 INFECTED WOUND: ICD-10-CM

## 2025-06-26 DIAGNOSIS — A49.02 MRSA (METHICILLIN RESISTANT STAPHYLOCOCCUS AUREUS): ICD-10-CM

## 2025-06-26 DIAGNOSIS — J40 BRONCHITIS: ICD-10-CM

## 2025-06-26 DIAGNOSIS — T14.8XXA INFECTED WOUND: ICD-10-CM

## 2025-06-26 DIAGNOSIS — I89.0 LYMPHEDEMA: ICD-10-CM

## 2025-06-26 PROCEDURE — 99309 SBSQ NF CARE MODERATE MDM 30: CPT | Performed by: INTERNAL MEDICINE
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
## 2025-07-14 ENCOUNTER — HOSPITAL ENCOUNTER (INPATIENT)
Facility: HOSPITAL | Age: 70
LOS: 8 days | Discharge: NURSING, REHAB OR LONG-TERM CARE | End: 2025-07-22
Attending: EMERGENCY MEDICINE | Admitting: HOSPITALIST

## 2025-07-14 ENCOUNTER — APPOINTMENT (OUTPATIENT)
Dept: CT IMAGING | Facility: HOSPITAL | Age: 70
End: 2025-07-14
Attending: EMERGENCY MEDICINE

## 2025-07-14 DIAGNOSIS — R65.21 SEPTIC SHOCK (HCC): ICD-10-CM

## 2025-07-14 DIAGNOSIS — I48.91 ATRIAL FIBRILLATION WITH RAPID VENTRICULAR RESPONSE (HCC): ICD-10-CM

## 2025-07-14 DIAGNOSIS — K52.9 ACUTE COLITIS: ICD-10-CM

## 2025-07-14 DIAGNOSIS — S82.402G TIBIA/FIBULA FRACTURE, LEFT, CLOSED, WITH DELAYED HEALING, SUBSEQUENT ENCOUNTER: ICD-10-CM

## 2025-07-14 DIAGNOSIS — S82.202G TIBIA/FIBULA FRACTURE, LEFT, CLOSED, WITH DELAYED HEALING, SUBSEQUENT ENCOUNTER: ICD-10-CM

## 2025-07-14 DIAGNOSIS — N30.00 ACUTE CYSTITIS WITHOUT HEMATURIA: Primary | ICD-10-CM

## 2025-07-14 DIAGNOSIS — A41.9 SEPSIS DUE TO URINARY TRACT INFECTION (HCC): ICD-10-CM

## 2025-07-14 DIAGNOSIS — N39.0 SEPSIS DUE TO URINARY TRACT INFECTION (HCC): ICD-10-CM

## 2025-07-14 DIAGNOSIS — F32.1 MAJOR DEPRESSIVE DISORDER, SINGLE EPISODE, MODERATE (HCC): ICD-10-CM

## 2025-07-14 DIAGNOSIS — A41.9 SEPTIC SHOCK (HCC): ICD-10-CM

## 2025-07-14 PROBLEM — A04.72 C. DIFFICILE COLITIS: Status: ACTIVE | Noted: 2025-07-14

## 2025-07-14 LAB
ADENOVIRUS F 40/41 PCR: NEGATIVE
ALBUMIN SERPL-MCNC: 3 G/DL (ref 3.2–4.8)
ALBUMIN/GLOB SERPL: 1.3 (ref 1–2)
ALP LIVER SERPL-CCNC: 103 U/L (ref 55–142)
ALT SERPL-CCNC: 11 U/L (ref 10–49)
ANION GAP SERPL CALC-SCNC: 11 MMOL/L (ref 0–18)
APTT PPP: 33.4 SECONDS (ref 23–36)
AST SERPL-CCNC: 19 U/L (ref ?–34)
ASTROVIRUS PCR: NEGATIVE
ATRIAL RATE: 121 BPM
BASOPHILS # BLD AUTO: 0.1 X10(3) UL (ref 0–0.2)
BASOPHILS NFR BLD AUTO: 0.4 %
BILIRUB SERPL-MCNC: 1.3 MG/DL (ref 0.2–1.1)
BILIRUB UR QL: NEGATIVE
BUN BLD-MCNC: 7 MG/DL (ref 9–23)
BUN/CREAT SERPL: 13 (ref 10–20)
C CAYETANENSIS DNA SPEC QL NAA+PROBE: NEGATIVE
C DIFF GDH + TOXINS A+B STL QL IA.RAPID: DETECTED
C DIFF TOX B STL QL: POSITIVE
CALCIUM BLD-MCNC: 8.1 MG/DL (ref 8.7–10.4)
CAMPY SP DNA.DIARRHEA STL QL NAA+PROBE: NEGATIVE
CHLORIDE SERPL-SCNC: 104 MMOL/L (ref 98–112)
CO2 SERPL-SCNC: 21 MMOL/L (ref 21–32)
COLOR UR: YELLOW
CREAT BLD-MCNC: 0.54 MG/DL (ref 0.55–1.02)
CRYPTOSP DNA SPEC QL NAA+PROBE: NEGATIVE
DEPRECATED RDW RBC AUTO: 50.3 FL (ref 35.1–46.3)
EAEC PAA PLAS AGGR+AATA ST NAA+NON-PRB: NEGATIVE
EC STX1+STX2 + H7 FLIC SPEC NAA+PROBE: NEGATIVE
EGFRCR SERPLBLD CKD-EPI 2021: 98 ML/MIN/1.73M2 (ref 60–?)
ENTAMOEBA HISTOLYTICA PCR: NEGATIVE
EOSINOPHIL # BLD AUTO: 0 X10(3) UL (ref 0–0.7)
EOSINOPHIL NFR BLD AUTO: 0 %
EPEC EAE GENE STL QL NAA+NON-PROBE: NEGATIVE
ERYTHROCYTE [DISTWIDTH] IN BLOOD BY AUTOMATED COUNT: 14.5 % (ref 11–15)
ETEC LTA+ST1A+ST1B TOX ST NAA+NON-PROBE: NEGATIVE
GIARDIA LAMBLIA PCR: NEGATIVE
GLOBULIN PLAS-MCNC: 2.4 G/DL (ref 2–3.5)
GLUCOSE BLD-MCNC: 110 MG/DL (ref 70–99)
GLUCOSE UR-MCNC: NORMAL MG/DL
HCT VFR BLD AUTO: 35.4 % (ref 35–48)
HGB BLD-MCNC: 12 G/DL (ref 12–16)
IMM GRANULOCYTES # BLD AUTO: 0.14 X10(3) UL (ref 0–1)
IMM GRANULOCYTES NFR BLD: 0.6 %
INR BLD: 1.65 (ref 0.8–1.2)
KETONES UR-MCNC: NEGATIVE MG/DL
LACTATE SERPL-SCNC: 1.7 MMOL/L (ref 0.5–2)
LEUKOCYTE ESTERASE UR QL STRIP.AUTO: 500
LIPASE SERPL-CCNC: 14 U/L (ref 12–53)
LYMPHOCYTES # BLD AUTO: 0.8 X10(3) UL (ref 1–4)
LYMPHOCYTES NFR BLD AUTO: 3.6 %
MCH RBC QN AUTO: 31.9 PG (ref 26–34)
MCHC RBC AUTO-ENTMCNC: 33.9 G/DL (ref 31–37)
MCV RBC AUTO: 94.1 FL (ref 80–100)
MONOCYTES # BLD AUTO: 1.76 X10(3) UL (ref 0.1–1)
MONOCYTES NFR BLD AUTO: 7.9 %
NEUTROPHILS # BLD AUTO: 19.47 X10 (3) UL (ref 1.5–7.7)
NEUTROPHILS # BLD AUTO: 19.47 X10(3) UL (ref 1.5–7.7)
NEUTROPHILS NFR BLD AUTO: 87.5 %
NITRITE UR QL STRIP.AUTO: NEGATIVE
NOROVIRUS GI/GII PCR: NEGATIVE
OSMOLALITY SERPL CALC.SUM OF ELEC: 281 MOSM/KG (ref 275–295)
P AXIS: 26 DEGREES
P SHIGELLOIDES DNA STL QL NAA+PROBE: NEGATIVE
P-R INTERVAL: 190 MS
PH UR: 7 (ref 5–8)
PLATELET # BLD AUTO: 283 10(3)UL (ref 150–450)
POTASSIUM SERPL-SCNC: 3.2 MMOL/L (ref 3.5–5.1)
PROT SERPL-MCNC: 5.4 G/DL (ref 5.7–8.2)
PROT UR-MCNC: 30 MG/DL
PROTHROMBIN TIME: 20.4 SECONDS (ref 11.6–14.8)
Q-T INTERVAL: 336 MS
QRS DURATION: 72 MS
QTC CALCULATION (BEZET): 477 MS
R AXIS: -21 DEGREES
RBC # BLD AUTO: 3.76 X10(6)UL (ref 3.8–5.3)
RBC #/AREA URNS AUTO: >10 /HPF
ROTAVIRUS A PCR: NEGATIVE
SALMONELLA DNA SPEC QL NAA+PROBE: NEGATIVE
SAPOVIRUS PCR: NEGATIVE
SHIGELLA SP+EIEC IPAH ST NAA+NON-PROBE: NEGATIVE
SODIUM SERPL-SCNC: 136 MMOL/L (ref 136–145)
SP GR UR STRIP: 1.01 (ref 1–1.03)
T AXIS: 178 DEGREES
UROBILINOGEN UR STRIP-ACNC: NORMAL
V CHOLERAE DNA SPEC QL NAA+PROBE: NEGATIVE
VENTRICULAR RATE: 121 BPM
VIBRIO DNA SPEC NAA+PROBE: NEGATIVE
WBC # BLD AUTO: 22.3 X10(3) UL (ref 4–11)
WBC #/AREA URNS AUTO: >50 /HPF
YEAST UR QL: PRESENT /HPF
YERSINIA DNA SPEC NAA+PROBE: NEGATIVE

## 2025-07-14 PROCEDURE — 3E033XZ INTRODUCTION OF VASOPRESSOR INTO PERIPHERAL VEIN, PERCUTANEOUS APPROACH: ICD-10-PCS | Performed by: HOSPITALIST

## 2025-07-14 PROCEDURE — 74178 CT ABD&PLV WO CNTR FLWD CNTR: CPT | Performed by: RADIOLOGY

## 2025-07-14 PROCEDURE — 99223 1ST HOSP IP/OBS HIGH 75: CPT | Performed by: HOSPITALIST

## 2025-07-14 PROCEDURE — 99291 CRITICAL CARE FIRST HOUR: CPT | Performed by: INTERNAL MEDICINE

## 2025-07-14 RX ORDER — SACCHAROMYCES BOULARDII 250 MG
250 CAPSULE ORAL DAILY
COMMUNITY

## 2025-07-14 RX ORDER — DOCUSATE SODIUM 100 MG/1
100 CAPSULE, LIQUID FILLED ORAL 2 TIMES DAILY
COMMUNITY

## 2025-07-14 RX ORDER — POTASSIUM CHLORIDE 1500 MG/1
1 TABLET, EXTENDED RELEASE ORAL 2 TIMES DAILY
COMMUNITY

## 2025-07-14 RX ORDER — ONDANSETRON 2 MG/ML
4 INJECTION INTRAMUSCULAR; INTRAVENOUS ONCE
Status: COMPLETED | OUTPATIENT
Start: 2025-07-14 | End: 2025-07-14

## 2025-07-14 RX ORDER — MORPHINE SULFATE 2 MG/ML
2 INJECTION, SOLUTION INTRAMUSCULAR; INTRAVENOUS EVERY 2 HOUR PRN
Status: DISCONTINUED | OUTPATIENT
Start: 2025-07-14 | End: 2025-07-22

## 2025-07-14 RX ORDER — ACETAMINOPHEN 500 MG
500 TABLET ORAL EVERY 4 HOURS PRN
Status: DISCONTINUED | OUTPATIENT
Start: 2025-07-14 | End: 2025-07-22

## 2025-07-14 RX ORDER — ONDANSETRON 4 MG/1
4 TABLET, ORALLY DISINTEGRATING ORAL EVERY 6 HOURS PRN
COMMUNITY

## 2025-07-14 RX ORDER — PSYLLIUM HUSK 3.4 G/5.8G
1 POWDER ORAL DAILY
COMMUNITY

## 2025-07-14 RX ORDER — MORPHINE SULFATE 2 MG/ML
1 INJECTION, SOLUTION INTRAMUSCULAR; INTRAVENOUS EVERY 2 HOUR PRN
Status: DISCONTINUED | OUTPATIENT
Start: 2025-07-14 | End: 2025-07-22

## 2025-07-14 RX ORDER — AMOXICILLIN 250 MG
2 CAPSULE ORAL DAILY
COMMUNITY

## 2025-07-14 RX ORDER — MORPHINE SULFATE 4 MG/ML
4 INJECTION, SOLUTION INTRAMUSCULAR; INTRAVENOUS EVERY 2 HOUR PRN
Status: DISCONTINUED | OUTPATIENT
Start: 2025-07-14 | End: 2025-07-22

## 2025-07-14 RX ORDER — SODIUM CHLORIDE 9 MG/ML
INJECTION, SOLUTION INTRAVENOUS CONTINUOUS
Status: DISCONTINUED | OUTPATIENT
Start: 2025-07-14 | End: 2025-07-21

## 2025-07-14 RX ORDER — VANCOMYCIN HYDROCHLORIDE 125 MG/1
125 CAPSULE ORAL 4 TIMES DAILY
Status: DISCONTINUED | OUTPATIENT
Start: 2025-07-14 | End: 2025-07-16

## 2025-07-14 RX ORDER — BENZONATATE 200 MG/1
200 CAPSULE ORAL EVERY 8 HOURS PRN
COMMUNITY

## 2025-07-14 RX ORDER — SODIUM CHLORIDE 9 MG/ML
1000 INJECTION, SOLUTION INTRAVENOUS ONCE
Status: COMPLETED | OUTPATIENT
Start: 2025-07-14 | End: 2025-07-14

## 2025-07-14 RX ORDER — SODIUM PHOSPHATE,MONO-DIBASIC 19G-7G/118
1 ENEMA (ML) RECTAL
COMMUNITY

## 2025-07-14 RX ORDER — METOCLOPRAMIDE 5 MG/1
5 TABLET ORAL EVERY 6 HOURS PRN
COMMUNITY

## 2025-07-14 RX ORDER — HYDROCODONE BITARTRATE AND ACETAMINOPHEN 5; 325 MG/1; MG/1
1 TABLET ORAL EVERY 4 HOURS PRN
COMMUNITY

## 2025-07-14 RX ORDER — TAZAROTENE 0.5 MG/G
1 CREAM TOPICAL
COMMUNITY

## 2025-07-14 RX ORDER — VANCOMYCIN HYDROCHLORIDE 125 MG/1
125 CAPSULE ORAL ONCE
Status: DISCONTINUED | OUTPATIENT
Start: 2025-07-14 | End: 2025-07-15 | Stop reason: ALTCHOICE

## 2025-07-14 RX ORDER — SODIUM CHLORIDE 9 MG/ML
INJECTION, SOLUTION INTRAVENOUS CONTINUOUS
Status: DISCONTINUED | OUTPATIENT
Start: 2025-07-14 | End: 2025-07-15

## 2025-07-14 RX ORDER — TRAMADOL HYDROCHLORIDE 50 MG/1
100 TABLET ORAL NIGHTLY
COMMUNITY

## 2025-07-14 RX ORDER — HYDROCODONE BITARTRATE AND ACETAMINOPHEN 5; 325 MG/1; MG/1
2 TABLET ORAL EVERY 4 HOURS PRN
Status: ON HOLD | COMMUNITY
End: 2025-07-22

## 2025-07-14 RX ORDER — ONDANSETRON 2 MG/ML
4 INJECTION INTRAMUSCULAR; INTRAVENOUS EVERY 6 HOURS PRN
Status: DISCONTINUED | OUTPATIENT
Start: 2025-07-14 | End: 2025-07-15

## 2025-07-14 RX ORDER — TRAMADOL HYDROCHLORIDE 50 MG/1
50 TABLET ORAL DAILY
Status: ON HOLD | COMMUNITY
End: 2025-07-22

## 2025-07-14 RX ORDER — METOCLOPRAMIDE HYDROCHLORIDE 5 MG/ML
10 INJECTION INTRAMUSCULAR; INTRAVENOUS EVERY 8 HOURS PRN
Status: DISCONTINUED | OUTPATIENT
Start: 2025-07-14 | End: 2025-07-15

## 2025-07-14 RX ORDER — FAMOTIDINE 20 MG/1
20 TABLET, FILM COATED ORAL DAILY
COMMUNITY

## 2025-07-14 RX ORDER — BISACODYL 10 MG
10 SUPPOSITORY, RECTAL RECTAL
COMMUNITY
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
## 2025-07-14 NOTE — ED INITIAL ASSESSMENT (HPI)
Pt to ED via [facility] w/ c/o generalized abdominal pain since last night, denies any n/v/d or fevers.   Hx afib - on Eliquis.   Denies any chest pain or shortness of breath.
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
## 2025-07-14 NOTE — ED QUICK NOTES
Orders for admission, patient is aware of plan and ready to go upstairs. Any questions, please call ED RN jass at extension [phone].     Patient Covid vaccination status: Fully vaccinated     COVID Test Ordered in ED: None    COVID Suspicion at Admission: N/A    Running Infusions: Medication Infusions[1]     Mental Status/LOC at time of transport: x4    Other pertinent information:   CIWA score: N/A   NIH score:  N/A             [1]    norepinephrine 10 mcg/min (07/14/25 6722)

## 2025-07-14 NOTE — ED PROVIDER NOTES
Patient Seen in: [facility] Emergency Department    History     Chief Complaint   Patient presents with    Abdominal Pain       HPI    70-year-old female who presents to the emergency department given since yesterday she has been having copious diarrhea, generalized abdominal pain mostly in the lower quadrants of her abdomen.  She denies any chest pain or dyspnea or any fevers but does report nausea.    History reviewed. [Past Medical History]    [Past Medical History]   Abnormal mammogram    Allergic rhinitis    Arrhythmia    Arthritis    Asthma (HCC)    Atrial fibrillation (HCC)    Bronchitis    Elevated blood pressure    Extrinsic asthma, unspecified    Incisional hernia, incarcerated    Obesity    Osteoarthritis    Primary hypertension    Rosacea    Varicose veins    Varicose veins    Venous insufficiency    Ventral hernia with obstruction and without gangrene    Visual impairment    Vitamin D deficiency       History reviewed. Past Surgical History[1]      Medications :  [Prescriptions Prior to Admission]    [Prescriptions Prior to Admission]  (Not in a hospital admission)       [Family History]    [Family History]  Problem Relation Age of Onset    Pulmonary Disease Father         COPD    Hypertension Father     Other (Other) Father     Heart Disorder Father     Obesity Father     Skin cancer Sister          of metastatic vulvar cancer    Hypertension Sister     Anemia Sister     Cancer Sister     Obesity Sister     Heart Attack Brother         MI    Heart Surgery Brother         CABG    Obesity Brother     Cataracts Sister     Heart Disorder Mother     Diabetes Maternal Grandmother        Smoking Status: [Social Hx on file]    [Social Hx on file]  Social History  Socioeconomic History    Marital status:    Tobacco Use    Smoking status: Former     Current packs/day: 0.00     Average packs/day: 0.4 packs/day for 33.0 years (13.2 ttl pk-yrs)     Types: Cigarettes     Quit date: 6/15/1974      Years since quittin.1     Passive exposure: Past    Smokeless tobacco: Never   Vaping Use    Vaping status: Never Used   Substance and Sexual Activity    Alcohol use: Yes     Alcohol/week: 5.0 standard drinks of alcohol     Types: 5 Cans of beer per week     Comment: once a wk    Drug use: No   Other Topics Concern    Caffeine Concern Yes     Comment: Coffee / Soda occasionally       Constitutional and vital signs reviewed.      Social History and Family History elements reviewed from today, pertinent positives to the presenting problem noted.    Physical Exam     ED Triage Vitals [25 1159]   BP 95/60   Pulse (!) 125   Resp 22   Temp 98.1 °F (36.7 °C)   Temp src Oral   SpO2 94 %   O2 Device None (Room air)       All measures to prevent infection transmission during my interaction with the patient were taken. The patient was already wearing a droplet mask on my arrival to the room. Personal protective equipment was worn throughout the duration of the exam.  Handwashing was performed prior to and after the exam.  Stethoscope and any equipment used during my examination was cleaned with super sani-cloth germicidal wipes following the exam.     Physical Exam    General: Mild distress  Mouth/Throat/Ears/Nose: Oropharynx is clear   Eyes: EOM are normal.   Neck: Normal range of motion. Supple.   Cardiovascular: Tachycardic rate, irregularly irregular rhythm  Respiratory/Chest: Clear and equal bilaterally. Exhibits no tenderness.  Gastrointestinal: Soft, generalized tenderness mostly in the lower quadrants, non-distended. Bowel sounds are normal.   Musculoskeletal:No swelling or deformity.   Neurological: Alert and appropriate. No focal deficits.   Skin: Skin is warm and dry. No pallor.        ED Course        Labs Reviewed   CBC WITH DIFFERENTIAL WITH PLATELET - Abnormal; Notable for the following components:       Result Value    WBC 22.3 (*)     RBC 3.76 (*)     RDW-SD 50.3 (*)     Neutrophil Absolute Prelim  19.47 (*)     Neutrophil Absolute 19.47 (*)     Lymphocyte Absolute 0.80 (*)     Monocyte Absolute 1.76 (*)     All other components within normal limits   COMP METABOLIC PANEL (14) - Abnormal; Notable for the following components:    Glucose 110 (*)     Potassium 3.2 (*)     BUN 7 (*)     Creatinine 0.54 (*)     Calcium, Total 8.1 (*)     Bilirubin, Total 1.3 (*)     Total Protein 5.4 (*)     Albumin 3.0 (*)     All other components within normal limits   URINALYSIS WITH CULTURE REFLEX - Abnormal; Notable for the following components:    Clarity Urine Ex.Turbid (*)     Blood Urine 3+ (*)     Protein Urine 30 (*)     Leukocyte Esterase Urine 500 (*)     WBC Urine >50 (*)     RBC Urine >10 (*)     Bacteria Urine 3+ (*)     Squamous Epi. Cells Few (*)     Yeast Urine Present (*)     All other components within normal limits   PROTHROMBIN TIME (PT) - Abnormal; Notable for the following components:    PT 20.4 (*)     INR 1.65 (*)     All other components within normal limits   LIPASE - Normal   PTT, ACTIVATED - Normal   LACTIC ACID, PLASMA - Normal   RAINBOW DRAW LAVENDER   RAINBOW DRAW LIGHT GREEN   RAINBOW DRAW BLUE   RAINBOW DRAW GOLD   GI STOOL PANEL BY PCR   C. DIFFICILE(TOXIGENIC)PCR   STOOL CULTURE W/SHIGATOXIN    Narrative:     The following orders were created for panel order Stool Culture W/Shigatoxin.                  Procedure                               Abnormality         Status                                     ---------                               -----------         ------                                     Stool Culture[[id number]]                                    In process                                 Shigatoxin[[id number]]                                       In process                                                   Please view results for these tests on the individual orders.   URINE CULTURE, ROUTINE   BLOOD CULTURE   BLOOD CULTURE   STOOL CULTURE(P)   SHIGATOXIN     EKG    Rate,  intervals and axes as noted on EKG Report.  Rate: 121  Rhythm: Atrial Fibrillation  Reading: Atrial fibrillation with rapid ventricular response.           As Interpreted by me    Imaging Results Available and Reviewed while in ED: CTA ABD/PEL (CPT=74174)  Result Date: 7/14/2025  CONCLUSION: Moderate grade acute colitis of the distal sigmoid colon and rectum. Infectious versus inflammatory etiologies are felt to be most likely. Given short segment of involvement, ischemic etiology is also within the differential. No pneumatosis or pneumoperitoneum. No obstruction. Multiple other incidental findings as described in the body of the report. Electronically Verified and Signed by Attending Radiologist: Sarath Treviño MD 7/14/2025 2:00 PM Workstation: [employer]    ED Medications Administered:   Medications   norepinephrine (Levophed) 4 mg/250mL infusion premix (5 mcg/min Intravenous Rate/Dose Change 7/14/25 1452)   meropenem (Merrem) 1 g in sodium chloride 0.9% 100mL IVPB-ACOSTA (1 g Intravenous New Bag 7/14/25 1452)   sodium chloride 0.9 % IV bolus 1,000 mL (has no administration in time range)   vancomycin (Vancocin) cap 125 mg (has no administration in time range)   sodium chloride 0.9 % IV bolus 1,000 mL (0 mL Intravenous Stopped 7/14/25 1417)   ondansetron (Zofran) 4 MG/2ML injection 4 mg (4 mg Intravenous Given 7/14/25 1236)   iopamidol 76% (ISOVUE-370) injection for power injector (85 mL Intravenous Given 7/14/25 1308)   sodium chloride 0.9 % IV bolus 1,000 mL (0 mL Intravenous Stopped 7/14/25 1402)         MDM     Vitals:    07/14/25 1330 07/14/25 1400 07/14/25 1425 07/14/25 1430   BP: (!) 82/32 97/56 97/51 107/54   Pulse: 102 84 81 77   Resp: 26 23 24 22   Temp:       TempSrc:       SpO2: 96% 96% 98% 96%     *I personally reviewed and interpreted all ED vitals.    Pulse Ox: 96%, Room air, Normal     Monitor Interpretation:   atrial fibrillation, with ventricular rate of 115 as interpreted by me.  The cardiac monitor  was ordered given tachycardia.      Medical Decision Making      Differential Diagnosis/ Diagnostic Considerations: Diverticulitis, C. difficile, gastroenteritis, urinary tract infection    Complicating Factors: The patient already has atrial fibrillation to contribute to the complexity of this ED evaluation.    I reviewed prior chart records including SNF note from 2025.  Patient here hypotensive. Levophed ordered. 30 mL/kg sepsis bolus ordered, perfusion reexamination performed at 3:10 PM.  Lab work notable for leukocytosis and urinary tract infection.  Meropenem ordered. Discussed with Dr. Johnson, recommended adding po vanc, which was ordered. C diff pending.  CT scan with colitis, most likely related to C. difficile, agree with report.  Atrial fibrillation with rapid ventricular response improved significantly with IV fluid bolus    Admitted in stable condition.  Patient is comfortable with the plan.      I spent 95 minutes of critical care time caring for this patient. This does not include time spent on separately reported billable procedures.       Disposition and Plan     Clinical Impression:  1. Acute cystitis without hematuria    2. Acute colitis    3. Sepsis due to urinary tract infection (HCC)        Disposition:  Admit    Follow-up:  No follow-up provider specified.    Medications Prescribed:  Current Discharge Medication List          Hospital Problems       Present on Admission  Date Reviewed: 2025          ICD-10-CM Noted POA    UTI (urinary tract infection) N39.0 2025 Unknown                       [1]   Past Surgical History:  Procedure Laterality Date    Breast biopsy Left       78    Colonoscopy N/A 2018    Procedure: COLONOSCOPY;  Surgeon: Aneudy Cary MD;  Location: [facility] ENDOSCOPY    D & c  77    Echo 2d, cardio (internal)  2016    EF 55-60%    Electrocardiogram, complete  2013    Scanned to media tab - DOS 2013    Hernia  surgery  09/11/2017    Repair of incarcerated hernia by Dr Medley    Other surgical history  2/1/20    ablation vericose veins    Repair rotator cuff,acute      right    Ventral hernia repair  09/2017

## 2025-07-14 NOTE — PLAN OF CARE
Problem: Patient Centered Care  Goal: Patient preferences are identified and integrated in the patient's plan of care  Description: Interventions:  - What would you like us to know as we care for you?  - Provide timely, complete, and accurate information to patient/family  - Incorporate patient and family knowledge, values, beliefs, and cultural backgrounds into the planning and delivery of care  - Encourage patient/family to participate in care and decision-making at the level they choose  - Honor patient and family perspectives and choices  Outcome: Progressing     Problem: Patient/Family Goals  Goal: Patient/Family Long Term Goal  Description: Patient's Long Term Goal:    Interventions:  - See additional Care Plan goals for specific interventions  Outcome: Progressing  Goal: Patient/Family Short Term Goal  Description: Patient's Short Term Goal:    Interventions:   - See additional Care Plan goals for specific interventions  Outcome: Progressing

## 2025-07-15 ENCOUNTER — APPOINTMENT (OUTPATIENT)
Dept: CT IMAGING | Facility: HOSPITAL | Age: 70
End: 2025-07-15
Attending: INTERNAL MEDICINE

## 2025-07-15 LAB
ANION GAP SERPL CALC-SCNC: 8 MMOL/L (ref 0–18)
BASOPHILS # BLD AUTO: 0.07 X10(3) UL (ref 0–0.2)
BASOPHILS NFR BLD AUTO: 0.6 %
BUN BLD-MCNC: 6 MG/DL (ref 9–23)
BUN/CREAT SERPL: 14.6 (ref 10–20)
CALCIUM BLD-MCNC: 7.5 MG/DL (ref 8.7–10.4)
CHLORIDE SERPL-SCNC: 110 MMOL/L (ref 98–112)
CO2 SERPL-SCNC: 21 MMOL/L (ref 21–32)
CREAT BLD-MCNC: 0.41 MG/DL (ref 0.55–1.02)
DEPRECATED RDW RBC AUTO: 50.5 FL (ref 35.1–46.3)
EGFRCR SERPLBLD CKD-EPI 2021: 105 ML/MIN/1.73M2 (ref 60–?)
EOSINOPHIL # BLD AUTO: 0.15 X10(3) UL (ref 0–0.7)
EOSINOPHIL NFR BLD AUTO: 1.2 %
ERYTHROCYTE [DISTWIDTH] IN BLOOD BY AUTOMATED COUNT: 14.5 % (ref 11–15)
GLUCOSE BLD-MCNC: 86 MG/DL (ref 70–99)
HCT VFR BLD AUTO: 30.2 % (ref 35–48)
HGB BLD-MCNC: 10 G/DL (ref 12–16)
IMM GRANULOCYTES # BLD AUTO: 0.07 X10(3) UL (ref 0–1)
IMM GRANULOCYTES NFR BLD: 0.6 %
LYMPHOCYTES # BLD AUTO: 1.26 X10(3) UL (ref 1–4)
LYMPHOCYTES NFR BLD AUTO: 9.9 %
MAGNESIUM SERPL-MCNC: 1.3 MG/DL (ref 1.6–2.6)
MCH RBC QN AUTO: 31.6 PG (ref 26–34)
MCHC RBC AUTO-ENTMCNC: 33.1 G/DL (ref 31–37)
MCV RBC AUTO: 95.6 FL (ref 80–100)
MONOCYTES # BLD AUTO: 1.15 X10(3) UL (ref 0.1–1)
MONOCYTES NFR BLD AUTO: 9.1 %
NEUTROPHILS # BLD AUTO: 9.97 X10 (3) UL (ref 1.5–7.7)
NEUTROPHILS # BLD AUTO: 9.97 X10(3) UL (ref 1.5–7.7)
NEUTROPHILS NFR BLD AUTO: 78.6 %
OSMOLALITY SERPL CALC.SUM OF ELEC: 285 MOSM/KG (ref 275–295)
PLATELET # BLD AUTO: 203 10(3)UL (ref 150–450)
POTASSIUM SERPL-SCNC: 2.8 MMOL/L (ref 3.5–5.1)
POTASSIUM SERPL-SCNC: 2.8 MMOL/L (ref 3.5–5.1)
POTASSIUM SERPL-SCNC: 3.1 MMOL/L (ref 3.5–5.1)
POTASSIUM SERPL-SCNC: 3.4 MMOL/L (ref 3.5–5.1)
RBC # BLD AUTO: 3.16 X10(6)UL (ref 3.8–5.3)
SODIUM SERPL-SCNC: 139 MMOL/L (ref 136–145)
WBC # BLD AUTO: 12.7 X10(3) UL (ref 4–11)

## 2025-07-15 PROCEDURE — 99223 1ST HOSP IP/OBS HIGH 75: CPT | Performed by: INTERNAL MEDICINE

## 2025-07-15 PROCEDURE — 99232 SBSQ HOSP IP/OBS MODERATE 35: CPT | Performed by: INTERNAL MEDICINE

## 2025-07-15 PROCEDURE — 99233 SBSQ HOSP IP/OBS HIGH 50: CPT | Performed by: INTERNAL MEDICINE

## 2025-07-15 RX ORDER — ONDANSETRON 2 MG/ML
4 INJECTION INTRAMUSCULAR; INTRAVENOUS EVERY 6 HOURS
Status: DISCONTINUED | OUTPATIENT
Start: 2025-07-15 | End: 2025-07-17

## 2025-07-15 RX ORDER — MAGNESIUM OXIDE 400 MG/1
800 TABLET ORAL ONCE
Status: DISCONTINUED | OUTPATIENT
Start: 2025-07-15 | End: 2025-07-15

## 2025-07-15 RX ORDER — ALBUTEROL SULFATE 90 UG/1
2 INHALANT RESPIRATORY (INHALATION) EVERY 4 HOURS PRN
Status: DISCONTINUED | OUTPATIENT
Start: 2025-07-15 | End: 2025-07-22

## 2025-07-15 RX ORDER — POTASSIUM CHLORIDE 14.9 MG/ML
20 INJECTION INTRAVENOUS ONCE
Status: COMPLETED | OUTPATIENT
Start: 2025-07-15 | End: 2025-07-15

## 2025-07-15 RX ORDER — HYDROCODONE BITARTRATE AND ACETAMINOPHEN 5; 325 MG/1; MG/1
1 TABLET ORAL EVERY 4 HOURS PRN
Refills: 0 | Status: DISCONTINUED | OUTPATIENT
Start: 2025-07-15 | End: 2025-07-22

## 2025-07-15 RX ORDER — METOCLOPRAMIDE HYDROCHLORIDE 5 MG/ML
10 INJECTION INTRAMUSCULAR; INTRAVENOUS EVERY 6 HOURS
Status: DISCONTINUED | OUTPATIENT
Start: 2025-07-15 | End: 2025-07-17

## 2025-07-15 RX ORDER — POTASSIUM CHLORIDE 1.5 G/1.58G
20 POWDER, FOR SOLUTION ORAL 2 TIMES DAILY
Status: DISCONTINUED | OUTPATIENT
Start: 2025-07-15 | End: 2025-07-16

## 2025-07-15 RX ORDER — GABAPENTIN 100 MG/1
100 CAPSULE ORAL 3 TIMES DAILY
Status: DISCONTINUED | OUTPATIENT
Start: 2025-07-15 | End: 2025-07-22

## 2025-07-15 RX ORDER — POTASSIUM CHLORIDE 1500 MG/1
40 TABLET, EXTENDED RELEASE ORAL EVERY 4 HOURS
Status: CANCELLED | OUTPATIENT
Start: 2025-07-15 | End: 2025-07-15

## 2025-07-15 RX ORDER — MAGNESIUM OXIDE 400 MG/1
800 TABLET ORAL ONCE
Status: CANCELLED | OUTPATIENT
Start: 2025-07-15 | End: 2025-07-15

## 2025-07-15 RX ORDER — DILTIAZEM HYDROCHLORIDE 120 MG/1
120 CAPSULE, EXTENDED RELEASE ORAL DAILY
Status: DISCONTINUED | OUTPATIENT
Start: 2025-07-15 | End: 2025-07-22

## 2025-07-15 RX ORDER — TRAMADOL HYDROCHLORIDE 50 MG/1
50 TABLET ORAL EVERY 4 HOURS PRN
Status: DISCONTINUED | OUTPATIENT
Start: 2025-07-15 | End: 2025-07-22

## 2025-07-15 RX ORDER — METHOCARBAMOL 500 MG/1
500 TABLET, FILM COATED ORAL 2 TIMES DAILY
Status: DISCONTINUED | OUTPATIENT
Start: 2025-07-15 | End: 2025-07-17

## 2025-07-15 RX ORDER — POTASSIUM CHLORIDE 14.9 MG/ML
20 INJECTION INTRAVENOUS ONCE
Status: COMPLETED | OUTPATIENT
Start: 2025-07-16 | End: 2025-07-16

## 2025-07-15 RX ORDER — ALPRAZOLAM 0.25 MG
0.25 TABLET ORAL EVERY 12 HOURS PRN
Status: DISCONTINUED | OUTPATIENT
Start: 2025-07-15 | End: 2025-07-22

## 2025-07-15 RX ORDER — POTASSIUM CHLORIDE 14.9 MG/ML
20 INJECTION INTRAVENOUS ONCE
Status: DISCONTINUED | OUTPATIENT
Start: 2025-07-15 | End: 2025-07-15

## 2025-07-15 RX ORDER — SPIRONOLACTONE 25 MG/1
50 TABLET ORAL DAILY
Status: DISCONTINUED | OUTPATIENT
Start: 2025-07-15 | End: 2025-07-22
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
## 2025-07-15 NOTE — PLAN OF CARE
Problem: PAIN - ADULT  Goal: Verbalizes/displays adequate comfort level or patient's stated pain goal  Description: INTERVENTIONS:  - Encourage pt to monitor pain and request assistance  - Assess pain using appropriate pain scale  - Administer analgesics based on type and severity of pain and evaluate response  - Implement non-pharmacological measures as appropriate and evaluate response  - Consider cultural and social influences on pain and pain management  - Manage/alleviate anxiety  - Utilize distraction and/or relaxation techniques  - Monitor for opioid side effects  - Notify MD/LIP if interventions unsuccessful or patient reports new pain  - Anticipate increased pain with activity and pre-medicate as appropriate  Outcome: Progressing     Problem: RISK FOR INFECTION - ADULT  Goal: Absence of fever/infection during anticipated neutropenic period  Description: INTERVENTIONS  - Monitor WBC  - Administer growth factors as ordered  - Implement neutropenic guidelines  Outcome: Progressing     Problem: SAFETY ADULT - FALL  Goal: Free from fall injury  Description: INTERVENTIONS:  - Assess pt frequently for physical needs  - Identify cognitive and physical deficits and behaviors that affect risk of falls.  - [place] fall precautions as indicated by assessment.  - Educate pt/family on patient safety including physical limitations  - Instruct pt to call for assistance with activity based on assessment  - Modify environment to reduce risk of injury  - Provide assistive devices as appropriate  - Consider OT/PT consult to assist with strengthening/mobility  - Encourage toileting schedule  Outcome: Progressing
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
## 2025-07-15 NOTE — PLAN OF CARE
Sonaliken Snider is A&O x 4.  Comes from [facility].    Max assist with ADLs.  Incontinent of bowel and bladder; culver cath intact with good output.  Flexi-seal intact with green watery stool.  VS:  stable.  Pain: PRN morphine, tramadol, and norco on order.  Plan:  CT brain/head d/t left foot drop, pain management, PO and IV ABX, IVF, CLD.  Psych on consult d/t anxiety and depression.  Bed in lowest position, alarms on, and call light within reach.  Continuation of care in place.

## 2025-07-15 NOTE — CM/SW NOTE
Patient is from [facility]. HAYDE sent return referral via aidin . Sw was informed that patient long term resident.    Plan: Pending medical clearance, DC to [facility].    HAYDE/THEO to remain available for support and/or discharge planning.     Maira YOUNG, MSW, LSW   x [phone]

## 2025-07-15 NOTE — PLAN OF CARE
Patient's wounds changed and cleansed with wound care nurse. Wound check with Radha Finch RN. Flexiseal inserted. Call light within reach, safety measures maintained. Transfer order in, report given to Dayami KUMARI. All personal belongings sent up with patient. Patient sent to floor on remote tele.      Problem: Patient Centered Care  Goal: Patient preferences are identified and integrated in the patient's plan of care  Description: Interventions:  - What would you like us to know as we care for you? I go to rehab.    - Provide timely, complete, and accurate information to patient/family  - Incorporate patient and family knowledge, values, beliefs, and cultural backgrounds into the planning and delivery of care  - Encourage patient/family to participate in care and decision-making at the level they choose  - Honor patient and family perspectives and choices  Outcome: Progressing     Problem: PAIN - ADULT  Goal: Verbalizes/displays adequate comfort level or patient's stated pain goal  Description: INTERVENTIONS:  - Encourage pt to monitor pain and request assistance  - Assess pain using appropriate pain scale  - Administer analgesics based on type and severity of pain and evaluate response  - Implement non-pharmacological measures as appropriate and evaluate response  - Consider cultural and social influences on pain and pain management  - Manage/alleviate anxiety  - Utilize distraction and/or relaxation techniques  - Monitor for opioid side effects  - Notify MD/LIP if interventions unsuccessful or patient reports new pain  - Anticipate increased pain with activity and pre-medicate as appropriate  Outcome: Progressing     Problem: RISK FOR INFECTION - ADULT  Goal: Absence of fever/infection during anticipated neutropenic period  Description: INTERVENTIONS  - Monitor WBC  - Administer growth factors as ordered  - Implement neutropenic guidelines  Outcome: Progressing     Problem: SAFETY ADULT - FALL  Goal: Free from fall  injury  Description: INTERVENTIONS:  - Assess pt frequently for physical needs  - Identify cognitive and physical deficits and behaviors that affect risk of falls.  - [place] fall precautions as indicated by assessment.  - Educate pt/family on patient safety including physical limitations  - Instruct pt to call for assistance with activity based on assessment  - Modify environment to reduce risk of injury  - Provide assistive devices as appropriate  - Consider OT/PT consult to assist with strengthening/mobility  - Encourage toileting schedule  Outcome: Progressing
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
## 2025-07-15 NOTE — PROGRESS NOTES
07/15/25 1025   Wound 07/14/25 Tibial Distal;Left   Date First Assessed/Time First Assessed: 07/14/25 1700   Present on Original Admission: Yes  Primary Wound Type: Venous Ulcer  Location: Tibial  Wound Location Orientation: Distal;Left   Wound Image    Site Assessment Clean;Dry;Fragile;Granulation tissue;Red   Closure Not approximated   Drainage Amount Scant   Drainage Description Serosanguineous   Treatments Cleansed;Saline   Dressing Aquacel Foam;Xeroform   Dressing Changed Changed   Dressing Status Dressing Changed;Removed;Old drainage   Non-staged Wound Description Partial thickness   Stephanie-wound Assessment Clean;Dry;Fragile;Hemosiderin staining   Wound Granulation Tissue Red   Wound Bed Granulation (%) 100 %   State of Healing Fully granulated   Wound Odor None   Wound 07/14/25 Leg Left;Lower;Posterior   Date First Assessed/Time First Assessed: 07/14/25 1741   Present on Original Admission: Yes  Location: Leg  Wound Location Orientation: Left;Lower;Posterior   Wound Image    Site Assessment Clean;Fragile;Granulation tissue;Moist;Red   Closure Not approximated   Drainage Amount Scant   Drainage Description Serosanguineous   Treatments Cleansed;Saline   Dressing Aquacel Foam;Xeroform   Dressing Changed Changed   Dressing Status Dressing Changed;Removed;Old drainage   Non-staged Wound Description Partial thickness   Stephanie-wound Assessment Clean;Dry;Fragile;Hemosiderin staining   Wound Granulation Tissue Red   Wound Bed Granulation (%) 100 %   State of Healing Fully granulated   Wound Odor None   Wound 07/14/25 Buttocks Right;Left   Date First Assessed/Time First Assessed: 07/14/25 1700   Present on Original Admission: Yes  Primary Wound Type: Incontinent skin breakdown  Location: Buttocks  Wound Location Orientation: Right;Left   Wound Image    Site Assessment Clean;Fragile;Excoriated;Moist;Painful;Red   Drainage Amount None   Treatments Cleansed;Topical (Barrier/Moisturizer/Ointment)   Dressing Open to air    Non-staged Wound Description Partial thickness   Stephanie-wound Assessment Clean;Intact;Fragile;Moist   Wound Odor None   Wound Follow Up   Follow up needed No     Pt seen with bedside Rn and Pct for incontinence dermatitis. Pt has been refusing flexi seal and to have prompt incontinence care \"because it hurt too much\". Pt's  excused himself from the room but stated he wanted to discuss the leg dressings with me when \"we were done\" to which I assured him I would do. The RN and I discussed with the pt, at length, the importance of NOT having stool on the skin, how the flexi seal would benefit her and the need for us to perform a complete bed change. Pt was agreeable and told us \"I will scream\". Pt was cleansed of all stool, zinc liberally applied to all reddened areas of the buttocks, perineum, groin and abdominal folds. Bedside Rn was able to successfully insert the flexi seal, liquid stool immediately began collecting in the bag. The left lower leg wounds are nearly healed venous stasis ulcers. Pt's  returned to the room and discussed what the nursing home used on the wounds. I recommended xeroform and bordered foams, pt's  agreeable.

## 2025-07-16 PROBLEM — F41.1 GENERALIZED ANXIETY DISORDER: Status: ACTIVE | Noted: 2025-07-16

## 2025-07-16 PROBLEM — F32.1 MAJOR DEPRESSIVE DISORDER, SINGLE EPISODE, MODERATE (HCC): Status: ACTIVE | Noted: 2025-07-16

## 2025-07-16 LAB
ANION GAP SERPL CALC-SCNC: 10 MMOL/L (ref 0–18)
BASOPHILS # BLD AUTO: 0.05 X10(3) UL (ref 0–0.2)
BASOPHILS NFR BLD AUTO: 0.9 %
BUN BLD-MCNC: 5 MG/DL (ref 9–23)
BUN/CREAT SERPL: 11.4 (ref 10–20)
CALCIUM BLD-MCNC: 7.8 MG/DL (ref 8.7–10.4)
CHLORIDE SERPL-SCNC: 111 MMOL/L (ref 98–112)
CO2 SERPL-SCNC: 19 MMOL/L (ref 21–32)
CREAT BLD-MCNC: 0.44 MG/DL (ref 0.55–1.02)
DEPRECATED RDW RBC AUTO: 50.4 FL (ref 35.1–46.3)
EGFRCR SERPLBLD CKD-EPI 2021: 103 ML/MIN/1.73M2 (ref 60–?)
EOSINOPHIL # BLD AUTO: 0.36 X10(3) UL (ref 0–0.7)
EOSINOPHIL NFR BLD AUTO: 6.8 %
ERYTHROCYTE [DISTWIDTH] IN BLOOD BY AUTOMATED COUNT: 14.6 % (ref 11–15)
GLUCOSE BLD-MCNC: 73 MG/DL (ref 70–99)
HCT VFR BLD AUTO: 30.6 % (ref 35–48)
HGB BLD-MCNC: 10.1 G/DL (ref 12–16)
IMM GRANULOCYTES # BLD AUTO: 0.04 X10(3) UL (ref 0–1)
IMM GRANULOCYTES NFR BLD: 0.8 %
LYMPHOCYTES # BLD AUTO: 0.87 X10(3) UL (ref 1–4)
LYMPHOCYTES NFR BLD AUTO: 16.4 %
MAGNESIUM SERPL-MCNC: 1.9 MG/DL (ref 1.6–2.6)
MCH RBC QN AUTO: 31.3 PG (ref 26–34)
MCHC RBC AUTO-ENTMCNC: 33 G/DL (ref 31–37)
MCV RBC AUTO: 94.7 FL (ref 80–100)
MONOCYTES # BLD AUTO: 0.45 X10(3) UL (ref 0.1–1)
MONOCYTES NFR BLD AUTO: 8.5 %
NEUTROPHILS # BLD AUTO: 3.52 X10 (3) UL (ref 1.5–7.7)
NEUTROPHILS # BLD AUTO: 3.52 X10(3) UL (ref 1.5–7.7)
NEUTROPHILS NFR BLD AUTO: 66.6 %
OSMOLALITY SERPL CALC.SUM OF ELEC: 286 MOSM/KG (ref 275–295)
PLATELET # BLD AUTO: 205 10(3)UL (ref 150–450)
POTASSIUM SERPL-SCNC: 3.5 MMOL/L (ref 3.5–5.1)
POTASSIUM SERPL-SCNC: 3.5 MMOL/L (ref 3.5–5.1)
RBC # BLD AUTO: 3.23 X10(6)UL (ref 3.8–5.3)
SODIUM SERPL-SCNC: 140 MMOL/L (ref 136–145)
WBC # BLD AUTO: 5.3 X10(3) UL (ref 4–11)

## 2025-07-16 PROCEDURE — 99233 SBSQ HOSP IP/OBS HIGH 50: CPT | Performed by: INTERNAL MEDICINE

## 2025-07-16 PROCEDURE — 99232 SBSQ HOSP IP/OBS MODERATE 35: CPT | Performed by: INTERNAL MEDICINE

## 2025-07-16 PROCEDURE — 90792 PSYCH DIAG EVAL W/MED SRVCS: CPT | Performed by: OTHER

## 2025-07-16 RX ORDER — CEFDINIR 300 MG/1
300 CAPSULE ORAL 2 TIMES DAILY
Status: DISCONTINUED | OUTPATIENT
Start: 2025-07-16 | End: 2025-07-16 | Stop reason: ALTCHOICE

## 2025-07-16 RX ORDER — POTASSIUM CHLORIDE 1500 MG/1
20 TABLET, EXTENDED RELEASE ORAL 2 TIMES DAILY
Status: DISCONTINUED | OUTPATIENT
Start: 2025-07-16 | End: 2025-07-22

## 2025-07-16 RX ORDER — CEFPODOXIME PROXETIL 200 MG/1
200 TABLET, FILM COATED ORAL 2 TIMES DAILY
Status: DISCONTINUED | OUTPATIENT
Start: 2025-07-16 | End: 2025-07-22

## 2025-07-16 RX ORDER — VANCOMYCIN HYDROCHLORIDE 250 MG/1
500 CAPSULE ORAL 4 TIMES DAILY
Status: DISCONTINUED | OUTPATIENT
Start: 2025-07-16 | End: 2025-07-18

## 2025-07-16 RX ORDER — DILTIAZEM HYDROCHLORIDE 30 MG/1
30 TABLET, FILM COATED ORAL EVERY 6 HOURS SCHEDULED
Status: DISCONTINUED | OUTPATIENT
Start: 2025-07-16 | End: 2025-07-16

## 2025-07-16 NOTE — PLAN OF CARE
Problem: PAIN - ADULT  Goal: Verbalizes/displays adequate comfort level or patient's stated pain goal  Description: INTERVENTIONS:  - Encourage pt to monitor pain and request assistance  - Assess pain using appropriate pain scale  - Administer analgesics based on type and severity of pain and evaluate response  - Implement non-pharmacological measures as appropriate and evaluate response  - Consider cultural and social influences on pain and pain management  - Manage/alleviate anxiety  - Utilize distraction and/or relaxation techniques  - Monitor for opioid side effects  - Notify MD/LIP if interventions unsuccessful or patient reports new pain  - Anticipate increased pain with activity and pre-medicate as appropriate  7/16/2025 0337 by Shani Sparks RN  Outcome: Progressing  7/16/2025 0336 by Shani Sparks RN  Outcome: Progressing     Problem: RISK FOR INFECTION - ADULT  Goal: Absence of fever/infection during anticipated neutropenic period  Description: INTERVENTIONS  - Monitor WBC  - Administer growth factors as ordered  - Implement neutropenic guidelines  7/16/2025 0337 by Shani Sparks RN  Outcome: Progressing  7/16/2025 0336 by Shani Sparks RN  Outcome: Progressing     Problem: SAFETY ADULT - FALL  Goal: Free from fall injury  Description: INTERVENTIONS:  - Assess pt frequently for physical needs  - Identify cognitive and physical deficits and behaviors that affect risk of falls.  - [place] fall precautions as indicated by assessment.  - Educate pt/family on patient safety including physical limitations  - Instruct pt to call for assistance with activity based on assessment  - Modify environment to reduce risk of injury  - Provide assistive devices as appropriate  - Consider OT/PT consult to assist with strengthening/mobility  - Encourage toileting schedule  7/16/2025 0337 by Shani Sparks RN  Outcome: Progressing  7/16/2025 0336 by Shani Sparks RN  Outcome: Progressing     Problem: GASTROINTESTINAL -  ADULT  Goal: Minimal or absence of nausea and vomiting  Description: INTERVENTIONS:  - Maintain adequate hydration with IV or PO as ordered and tolerated  - Nasogastric tube to low intermittent suction as ordered  - Evaluate effectiveness of ordered antiemetic medications  - Provide nonpharmacologic comfort measures as appropriate  - Advance diet as tolerated, if ordered  - Obtain nutritional consult as needed  - Evaluate fluid balance  Outcome: Progressing  Goal: Maintains or returns to baseline bowel function  Description: INTERVENTIONS:  - Assess bowel function  - Maintain adequate hydration with IV or PO as ordered and tolerated  - Evaluate effectiveness of GI medications  - Encourage mobilization and activity  - Obtain nutritional consult as needed  - Establish a toileting routine/schedule  - Consider collaborating with pharmacy to review patient's medication profile  Outcome: Progressing     Problem: SKIN/TISSUE INTEGRITY - ADULT  Goal: Skin integrity remains intact  Description: INTERVENTIONS  - Assess and document risk factors for pressure ulcer development  - Assess and document skin integrity  - Monitor for areas of redness and/or skin breakdown  - Initiate interventions, skin care algorithm/standards of care as needed  Outcome: Progressing  Goal: Incision(s), wounds(s) or drain site(s) healing without S/S of infection  Description: INTERVENTIONS:  - Assess and document risk factors for pressure ulcer development  - Assess and document skin integrity  - Assess and document dressing/incision, wound bed, drain sites and surrounding tissue  - Implement wound care per orders  - Initiate isolation precautions as appropriate  - Initiate Pressure Ulcer prevention bundle as indicated  Outcome: Progressing

## 2025-07-16 NOTE — CM/SW NOTE
Patient discussed in RN DC rounds. SW was informed that patient's spouse wishes to talk to SW. SW met with patient and spouse in her room. Spouse informed SW that he doesn't want patient to return back to [facility] due to therapy.   Spouse informed SW that patient has been privately paying since 5/5/2025. Patient's 100th day under medicar was 5/4/2025. Spouse is wanting a new facility near [place]. SW was informed that spouse requests new MAT referral to be sent to [facility], [facility]. SW sent new MAT referrals via aidin to above requested facility, and additional others.    Spouse informed SW that patient's ortho surgeon \"recommends\" patient to go [facility]. SW explained acute process to spouse and patient. Spouse expressed understanding.     Plan: Pending medical clearance, DC to [facility] vs New MAT, *list/choice    SW/CM to remain available for support and/or discharge planning.     Maira YOUNG, MSW, LSW   x [phone]
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
## 2025-07-16 NOTE — PLAN OF CARE
Patient is AxOx4, no complain pain, vitals done, Navarro catheter changed, all dressing changed, all needs completed in the room, call light within reach.    Problem: Patient Centered Care  Goal: Patient preferences are identified and integrated in the patient's plan of care  Description: Interventions:  - What would you like us to know as we care for you? From Abigail Nieto  - Provide timely, complete, and accurate information to patient/family  - Incorporate patient and family knowledge, values, beliefs, and cultural backgrounds into the planning and delivery of care  - Encourage patient/family to participate in care and decision-making at the level they choose  - Honor patient and family perspectives and choices  Outcome: Progressing     Problem: Patient/Family Goals  Goal: Patient/Family Long Term Goal  Description: Patient's Long Term Goal: Get better    Interventions:  - Follow healthcare team, treatment plan  -Monitor labs, Vitals & dianostics test  -Pain management.  - Diet recommendation.   -Partipate in therapy.  -Discharge planning    - See additional Care Plan goals for specific interventions  Outcome: Progressing  Goal: Patient/Family Short Term Goal  Description: Patient's Short Term Goal: Diarrhea free    Interventions:   - Monitor labs, vitals and diagnostic tests.  -Pain management, pharmacological interventions  -Diet recommendations  -Adequate oral intake   -Administer medications per order  -Follow MD orders  -Diagnostics per order  -Update/inform patient and family on plan of care  -Monitor appropriate labs    - See additional Care Plan goals for specific interventions  Outcome: Progressing     Problem: PAIN - ADULT  Goal: Verbalizes/displays adequate comfort level or patient's stated pain goal  Description: INTERVENTIONS:  - Encourage pt to monitor pain and request assistance  - Assess pain using appropriate pain scale  - Administer analgesics based on type and severity of pain and evaluate  response  - Implement non-pharmacological measures as appropriate and evaluate response  - Consider cultural and social influences on pain and pain management  - Manage/alleviate anxiety  - Utilize distraction and/or relaxation techniques  - Monitor for opioid side effects  - Notify MD/LIP if interventions unsuccessful or patient reports new pain  - Anticipate increased pain with activity and pre-medicate as appropriate  Outcome: Progressing     Problem: RISK FOR INFECTION - ADULT  Goal: Absence of fever/infection during anticipated neutropenic period  Description: INTERVENTIONS  - Monitor WBC  - Administer growth factors as ordered  - Implement neutropenic guidelines  Outcome: Progressing     Problem: SAFETY ADULT - FALL  Goal: Free from fall injury  Description: INTERVENTIONS:  - Assess pt frequently for physical needs  - Identify cognitive and physical deficits and behaviors that affect risk of falls.  - [place] fall precautions as indicated by assessment.  - Educate pt/family on patient safety including physical limitations  - Instruct pt to call for assistance with activity based on assessment  - Modify environment to reduce risk of injury  - Provide assistive devices as appropriate  - Consider OT/PT consult to assist with strengthening/mobility  - Encourage toileting schedule  Outcome: Progressing     Problem: GASTROINTESTINAL - ADULT  Goal: Minimal or absence of nausea and vomiting  Description: INTERVENTIONS:  - Maintain adequate hydration with IV or PO as ordered and tolerated  - Nasogastric tube to low intermittent suction as ordered  - Evaluate effectiveness of ordered antiemetic medications  - Provide nonpharmacologic comfort measures as appropriate  - Advance diet as tolerated, if ordered  - Obtain nutritional consult as needed  - Evaluate fluid balance  Outcome: Progressing  Goal: Maintains or returns to baseline bowel function  Description: INTERVENTIONS:  - Assess bowel function  - Maintain  adequate hydration with IV or PO as ordered and tolerated  - Evaluate effectiveness of GI medications  - Encourage mobilization and activity  - Obtain nutritional consult as needed  - Establish a toileting routine/schedule  - Consider collaborating with pharmacy to review patient's medication profile  Outcome: Progressing     Problem: SKIN/TISSUE INTEGRITY - ADULT  Goal: Skin integrity remains intact  Description: INTERVENTIONS  - Assess and document risk factors for pressure ulcer development  - Assess and document skin integrity  - Monitor for areas of redness and/or skin breakdown  - Initiate interventions, skin care algorithm/standards of care as needed  Outcome: Progressing  Goal: Incision(s), wounds(s) or drain site(s) healing without S/S of infection  Description: INTERVENTIONS:  - Assess and document risk factors for pressure ulcer development  - Assess and document skin integrity  - Assess and document dressing/incision, wound bed, drain sites and surrounding tissue  - Implement wound care per orders  - Initiate isolation precautions as appropriate  - Initiate Pressure Ulcer prevention bundle as indicated  Outcome: Progressing

## 2025-07-16 NOTE — PROGRESS NOTES
[facility]     Gastroenterology Progress Note    Sonali Snider Patient Status:  Inpatient    1955 MRN [id number]   Location [facility] 5SW/SE Attending Dolly Winkler MD   Hosp Day # 2 PCP Jarad Decker MD       Subjective:   She is feeling better today. Abd cramping improved, no further nausea or emesis. Flexiseal in place with green liquid output.     Objective:   Blood pressure 99/74, pulse 95, temperature 97.7 °F (36.5 °C), temperature source Oral, resp. rate 18, weight 243 lb 14.4 oz (110.6 kg), SpO2 99%. Body mass index is 47.63 kg/m².    General: awake, alert and oriented, no acute distress  HEENT: moist mucus membranes  PULM: no conversational dyspnea  CARDIOVASCULAR: regular rate and rhythm, the extremities are warm and well perfused  GI: soft, non-tender, non-distended, + BS, no rebound/guarding   EXTREMITIES: no edema, moving all extremities  SKIN: no visible rash  NEURO: appropriate and interactive    Assessment and Plan:   70-year-old female with a history of obesity BMI 44, atrial fibrillation on Eliquis, chronic wound on the left leg requiring multiple debridements and courses of antibiotics who presents with C diff colitis.  CT abdomen pelvis shows left-sided colitis.     Continue IVF  continue Vancomycin 125 mg p.o. 4 times daily  Ok to advance diet to low fiber diet, lactose free    D/w RN    Lucie Wilkes MD  [facility]      Results:     Lab Results   Component Value Date    WBC 5.3 2025    HGB 10.1 (L) 2025    HCT 30.6 (L) 2025    .0 2025    CREATSERUM 0.44 (L) 2025    BUN 5 (L) 2025     2025    K 3.5 2025    K 3.5 2025     2025    CO2 19.0 (L) 2025    GLU 73 2025    CA 7.8 (L) 2025    ALB 3.0 (L) 2025    ALKPHO 103 2025    BILT 1.3 (H) 2025    TP 5.4 (L) 2025    AST 19 2025    ALT 11 2025    PTT  33.4 07/14/2025    INR 1.65 (H) 07/14/2025    TSH 1.717 11/10/2023    VIKTORIA 40 09/10/2017    LIP 14 07/14/2025    MG 1.9 07/16/2025    B12 679 11/15/2024       CTA ABD/PEL (CPT=74174)  Result Date: 7/14/2025  CONCLUSION: Moderate grade acute colitis of the distal sigmoid colon and rectum. Infectious versus inflammatory etiologies are felt to be most likely. Given short segment of involvement, ischemic etiology is also within the differential. No pneumatosis or pneumoperitoneum. No obstruction. Multiple other incidental findings as described in the body of the report. Electronically Verified and Signed by Attending Radiologist: Sarath Treviño MD 7/14/2025 2:00 PM Workstation: [id number]    EKG 12 Lead  Result Date: 7/14/2025  Atrial fibrillation Cannot rule out Anterior infarct , age undetermined ST & T wave abnormality, consider lateral ischemia Abnormal ECG No previous ECGs found in [facility] Confirmed by EUGENIE WINTERS ([id number]) on 7/14/2025 2:38:10 PM

## 2025-07-17 LAB
ANION GAP SERPL CALC-SCNC: 10 MMOL/L (ref 0–18)
BASOPHILS # BLD AUTO: 0.05 X10(3) UL (ref 0–0.2)
BASOPHILS NFR BLD AUTO: 1.3 %
BUN BLD-MCNC: <5 MG/DL (ref 9–23)
CALCIUM BLD-MCNC: 7.6 MG/DL (ref 8.7–10.4)
CHLORIDE SERPL-SCNC: 112 MMOL/L (ref 98–112)
CO2 SERPL-SCNC: 19 MMOL/L (ref 21–32)
CREAT BLD-MCNC: 0.46 MG/DL (ref 0.55–1.02)
DEPRECATED RDW RBC AUTO: 53.1 FL (ref 35.1–46.3)
EGFRCR SERPLBLD CKD-EPI 2021: 102 ML/MIN/1.73M2 (ref 60–?)
EOSINOPHIL # BLD AUTO: 0.35 X10(3) UL (ref 0–0.7)
EOSINOPHIL NFR BLD AUTO: 9.4 %
ERYTHROCYTE [DISTWIDTH] IN BLOOD BY AUTOMATED COUNT: 14.6 % (ref 11–15)
GLUCOSE BLD-MCNC: 72 MG/DL (ref 70–99)
HCT VFR BLD AUTO: 33.6 % (ref 35–48)
HGB BLD-MCNC: 10.7 G/DL (ref 12–16)
IMM GRANULOCYTES # BLD AUTO: 0.01 X10(3) UL (ref 0–1)
IMM GRANULOCYTES NFR BLD: 0.3 %
LYMPHOCYTES # BLD AUTO: 0.79 X10(3) UL (ref 1–4)
LYMPHOCYTES NFR BLD AUTO: 21.2 %
MAGNESIUM SERPL-MCNC: 1.6 MG/DL (ref 1.6–2.6)
MCH RBC QN AUTO: 31.8 PG (ref 26–34)
MCHC RBC AUTO-ENTMCNC: 31.8 G/DL (ref 31–37)
MCV RBC AUTO: 99.7 FL (ref 80–100)
MONOCYTES # BLD AUTO: 0.34 X10(3) UL (ref 0.1–1)
MONOCYTES NFR BLD AUTO: 9.1 %
NEUTROPHILS # BLD AUTO: 2.18 X10 (3) UL (ref 1.5–7.7)
NEUTROPHILS # BLD AUTO: 2.18 X10(3) UL (ref 1.5–7.7)
NEUTROPHILS NFR BLD AUTO: 58.7 %
PLATELET # BLD AUTO: 207 10(3)UL (ref 150–450)
POTASSIUM SERPL-SCNC: 3 MMOL/L (ref 3.5–5.1)
POTASSIUM SERPL-SCNC: 3 MMOL/L (ref 3.5–5.1)
RBC # BLD AUTO: 3.37 X10(6)UL (ref 3.8–5.3)
SODIUM SERPL-SCNC: 141 MMOL/L (ref 136–145)
WBC # BLD AUTO: 3.7 X10(3) UL (ref 4–11)

## 2025-07-17 PROCEDURE — 99233 SBSQ HOSP IP/OBS HIGH 50: CPT | Performed by: INTERNAL MEDICINE

## 2025-07-17 PROCEDURE — 99232 SBSQ HOSP IP/OBS MODERATE 35: CPT | Performed by: INTERNAL MEDICINE

## 2025-07-17 PROCEDURE — 99233 SBSQ HOSP IP/OBS HIGH 50: CPT | Performed by: OTHER

## 2025-07-17 RX ORDER — ONDANSETRON 2 MG/ML
4 INJECTION INTRAMUSCULAR; INTRAVENOUS EVERY 12 HOURS
Status: DISCONTINUED | OUTPATIENT
Start: 2025-07-17 | End: 2025-07-22

## 2025-07-17 RX ORDER — MAGNESIUM OXIDE 400 MG/1
400 TABLET ORAL ONCE
Status: DISCONTINUED | OUTPATIENT
Start: 2025-07-17 | End: 2025-07-17

## 2025-07-17 RX ORDER — POTASSIUM CHLORIDE 1500 MG/1
40 TABLET, EXTENDED RELEASE ORAL EVERY 4 HOURS
Status: DISCONTINUED | OUTPATIENT
Start: 2025-07-17 | End: 2025-07-17

## 2025-07-17 RX ORDER — MAGNESIUM OXIDE 400 MG/1
400 TABLET ORAL ONCE
Status: COMPLETED | OUTPATIENT
Start: 2025-07-17 | End: 2025-07-17

## 2025-07-17 RX ORDER — POTASSIUM CHLORIDE 14.9 MG/ML
20 INJECTION INTRAVENOUS ONCE
Status: COMPLETED | OUTPATIENT
Start: 2025-07-17 | End: 2025-07-17

## 2025-07-17 RX ORDER — METOCLOPRAMIDE HYDROCHLORIDE 5 MG/ML
10 INJECTION INTRAMUSCULAR; INTRAVENOUS EVERY 12 HOURS
Status: DISCONTINUED | OUTPATIENT
Start: 2025-07-17 | End: 2025-07-22

## 2025-07-17 RX ORDER — MAGNESIUM SULFATE HEPTAHYDRATE 40 MG/ML
2 INJECTION, SOLUTION INTRAVENOUS ONCE
Status: COMPLETED | OUTPATIENT
Start: 2025-07-17 | End: 2025-07-17

## 2025-07-17 RX ORDER — ONDANSETRON 2 MG/ML
4 INJECTION INTRAMUSCULAR; INTRAVENOUS EVERY 12 HOURS
Status: DISCONTINUED | OUTPATIENT
Start: 2025-07-18 | End: 2025-07-17
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
## 2025-07-17 NOTE — CM/SW NOTE
Patient discussed in RN DC rounds. SW is awaiting therapy notes to send updates to the [facility] facilities before providing patient the new MAT list.     242pm- SW sent updated PT OT notes via aidin. Sw received a missed call from spouse. SW returned spouses call. Spouse was informed that therapy notes were sent over. List is still pending at this time. Spouse aware of [facility] able to accept, spouse declined. SW informed SW that if there are no accepting facilities that are satisfactory to patient's and spouse's expectations, then patient would have to return to [facility].     Plan: Pending medical clearance, DC to TBD- Return to [facility] vs [facility], *list/choice    SW/CM to remain available for support and/or discharge planning.     Maira YOUNG, MSW, LSW   x [phone]

## 2025-07-17 NOTE — CONGREGATE LIVING REVIEW
Congregate Living Authorization    [facility] has reviewed this case and the committee DOES NOT RECOMMEND discharge to a skilled nursing facility under skilled care.    The CLRC recommends:  Home with home health and any other appropriate caregiver assistance or medical equipment as needed vs respite in SNF.     Does not appear to have skilled services for MAT, but additional home support appears to be needed.    For questions regarding CLRC approval process, please contact the CM assigned to the case.  For questions regarding RN discharge workflow, please contact the unit Clinical Leader.

## 2025-07-17 NOTE — PLAN OF CARE
Problem: PAIN - ADULT  Goal: Verbalizes/displays adequate comfort level or patient's stated pain goal  Description: INTERVENTIONS:  - Encourage pt to monitor pain and request assistance  - Assess pain using appropriate pain scale  - Administer analgesics based on type and severity of pain and evaluate response  - Implement non-pharmacological measures as appropriate and evaluate response  - Consider cultural and social influences on pain and pain management  - Manage/alleviate anxiety  - Utilize distraction and/or relaxation techniques  - Monitor for opioid side effects  - Notify MD/LIP if interventions unsuccessful or patient reports new pain  - Anticipate increased pain with activity and pre-medicate as appropriate  Outcome: Progressing     Problem: RISK FOR INFECTION - ADULT  Goal: Absence of fever/infection during anticipated neutropenic period  Description: INTERVENTIONS  - Monitor WBC  - Administer growth factors as ordered  - Implement neutropenic guidelines  Outcome: Progressing     Problem: SAFETY ADULT - FALL  Goal: Free from fall injury  Description: INTERVENTIONS:  - Assess pt frequently for physical needs  - Identify cognitive and physical deficits and behaviors that affect risk of falls.  - [place] fall precautions as indicated by assessment.  - Educate pt/family on patient safety including physical limitations  - Instruct pt to call for assistance with activity based on assessment  - Modify environment to reduce risk of injury  - Provide assistive devices as appropriate  - Consider OT/PT consult to assist with strengthening/mobility  - Encourage toileting schedule  Outcome: Progressing     Problem: GASTROINTESTINAL - ADULT  Goal: Minimal or absence of nausea and vomiting  Description: INTERVENTIONS:  - Maintain adequate hydration with IV or PO as ordered and tolerated  - Nasogastric tube to low intermittent suction as ordered  - Evaluate effectiveness of ordered antiemetic medications  - Provide  nonpharmacologic comfort measures as appropriate  - Advance diet as tolerated, if ordered  - Obtain nutritional consult as needed  - Evaluate fluid balance  Outcome: Progressing  Goal: Maintains or returns to baseline bowel function  Description: INTERVENTIONS:  - Assess bowel function  - Maintain adequate hydration with IV or PO as ordered and tolerated  - Evaluate effectiveness of GI medications  - Encourage mobilization and activity  - Obtain nutritional consult as needed  - Establish a toileting routine/schedule  - Consider collaborating with pharmacy to review patient's medication profile  Outcome: Progressing     Problem: SKIN/TISSUE INTEGRITY - ADULT  Goal: Skin integrity remains intact  Description: INTERVENTIONS  - Assess and document risk factors for pressure ulcer development  - Assess and document skin integrity  - Monitor for areas of redness and/or skin breakdown  - Initiate interventions, skin care algorithm/standards of care as needed  Outcome: Progressing  Goal: Incision(s), wounds(s) or drain site(s) healing without S/S of infection  Description: INTERVENTIONS:  - Assess and document risk factors for pressure ulcer development  - Assess and document skin integrity  - Assess and document dressing/incision, wound bed, drain sites and surrounding tissue  - Implement wound care per orders  - Initiate isolation precautions as appropriate  - Initiate Pressure Ulcer prevention bundle as indicated  Outcome: Progressing
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
## 2025-07-17 NOTE — PROGRESS NOTES
[facility]     Gastroenterology Progress Note    Sonali Snider Patient Status:  Inpatient    1955 MRN [id number]   Location [facility] 5SW/SE Attending Dolly Winkler MD   Hosp Day # 3 PCP Jarad Decker MD       Subjective:   She is feeling better today. Abd cramping improved, no further nausea or emesis. Flexiseal in place, pt states output seems to be slowing    Objective:   Blood pressure 110/63, pulse 77, temperature 97.8 °F (36.6 °C), temperature source Oral, resp. rate 18, weight 243 lb 14.4 oz (110.6 kg), SpO2 99%. Body mass index is 47.63 kg/m².    General: awake, alert and oriented, no acute distress  HEENT: moist mucus membranes  PULM: no conversational dyspnea  CARDIOVASCULAR: regular rate and rhythm, the extremities are warm and well perfused  GI: soft, non-tender, non-distended, + BS, no rebound/guarding   EXTREMITIES: no edema, moving all extremities  SKIN: no visible rash  NEURO: appropriate and interactive    Assessment and Plan:   70-year-old female with a history of obesity BMI 44, atrial fibrillation on Eliquis, chronic wound on the left leg requiring multiple debridements and courses of antibiotics who presents with C diff colitis.  CT abdomen pelvis shows left-sided colitis.     Continue IVF  ID following, appreciate recs; vancomycin dose increased  to 500 mg QID  Ok to advance diet to low fiber diet, lactose free  Consider removal of the flexiseal as stools are becoming semi-loose     D/w RN  GI will be available as needed, please call with questions    Lucie Wilkes MD  [facility]      Results:     Lab Results   Component Value Date    WBC 3.7 (L) 2025    HGB 10.7 (L) 2025    HCT 33.6 (L) 2025    .0 2025    CREATSERUM 0.46 (L) 2025    BUN <5 (L) 2025     2025    K 3.0 (L) 2025    K 3.0 (L) 2025     2025    CO2 19.0 (L) 2025    GLU 72  07/17/2025    CA 7.6 (L) 07/17/2025    ALB 3.0 (L) 07/14/2025    ALKPHO 103 07/14/2025    BILT 1.3 (H) 07/14/2025    TP 5.4 (L) 07/14/2025    AST 19 07/14/2025    ALT 11 07/14/2025    PTT 33.4 07/14/2025    INR 1.65 (H) 07/14/2025    TSH 1.717 11/10/2023    VIKTORIA 40 09/10/2017    LIP 14 07/14/2025    MG 1.6 07/17/2025    B12 679 11/15/2024       No results found.
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
## 2025-07-18 LAB
ANION GAP SERPL CALC-SCNC: 9 MMOL/L (ref 0–18)
BASOPHILS # BLD AUTO: 0.05 X10(3) UL (ref 0–0.2)
BASOPHILS NFR BLD AUTO: 1.3 %
BUN BLD-MCNC: <5 MG/DL (ref 9–23)
CALCIUM BLD-MCNC: 7.3 MG/DL (ref 8.7–10.4)
CHLORIDE SERPL-SCNC: 113 MMOL/L (ref 98–112)
CO2 SERPL-SCNC: 18 MMOL/L (ref 21–32)
CREAT BLD-MCNC: 0.43 MG/DL (ref 0.55–1.02)
DEPRECATED RDW RBC AUTO: 50 FL (ref 35.1–46.3)
EGFRCR SERPLBLD CKD-EPI 2021: 104 ML/MIN/1.73M2 (ref 60–?)
EOSINOPHIL # BLD AUTO: 0.28 X10(3) UL (ref 0–0.7)
EOSINOPHIL NFR BLD AUTO: 7.4 %
ERYTHROCYTE [DISTWIDTH] IN BLOOD BY AUTOMATED COUNT: 14.4 % (ref 11–15)
GLUCOSE BLD-MCNC: 79 MG/DL (ref 70–99)
HCT VFR BLD AUTO: 29.4 % (ref 35–48)
HGB BLD-MCNC: 9.8 G/DL (ref 12–16)
IMM GRANULOCYTES # BLD AUTO: 0.05 X10(3) UL (ref 0–1)
IMM GRANULOCYTES NFR BLD: 1.3 %
LYMPHOCYTES # BLD AUTO: 0.79 X10(3) UL (ref 1–4)
LYMPHOCYTES NFR BLD AUTO: 20.8 %
MAGNESIUM SERPL-MCNC: 1.7 MG/DL (ref 1.6–2.6)
MCH RBC QN AUTO: 31.6 PG (ref 26–34)
MCHC RBC AUTO-ENTMCNC: 33.3 G/DL (ref 31–37)
MCV RBC AUTO: 94.8 FL (ref 80–100)
MONOCYTES # BLD AUTO: 0.48 X10(3) UL (ref 0.1–1)
MONOCYTES NFR BLD AUTO: 12.7 %
NEUTROPHILS # BLD AUTO: 2.14 X10 (3) UL (ref 1.5–7.7)
NEUTROPHILS # BLD AUTO: 2.14 X10(3) UL (ref 1.5–7.7)
NEUTROPHILS NFR BLD AUTO: 56.5 %
PLATELET # BLD AUTO: 198 10(3)UL (ref 150–450)
POTASSIUM SERPL-SCNC: 3.1 MMOL/L (ref 3.5–5.1)
POTASSIUM SERPL-SCNC: 3.1 MMOL/L (ref 3.5–5.1)
POTASSIUM SERPL-SCNC: 3.9 MMOL/L (ref 3.5–5.1)
RBC # BLD AUTO: 3.1 X10(6)UL (ref 3.8–5.3)
SODIUM SERPL-SCNC: 140 MMOL/L (ref 136–145)
WBC # BLD AUTO: 3.8 X10(3) UL (ref 4–11)

## 2025-07-18 PROCEDURE — 99233 SBSQ HOSP IP/OBS HIGH 50: CPT | Performed by: INTERNAL MEDICINE

## 2025-07-18 PROCEDURE — 99232 SBSQ HOSP IP/OBS MODERATE 35: CPT | Performed by: INTERNAL MEDICINE

## 2025-07-18 RX ORDER — POTASSIUM CHLORIDE 14.9 MG/ML
20 INJECTION INTRAVENOUS ONCE
Status: COMPLETED | OUTPATIENT
Start: 2025-07-18 | End: 2025-07-18

## 2025-07-18 RX ORDER — MAGNESIUM OXIDE 400 MG/1
400 TABLET ORAL ONCE
Status: DISCONTINUED | OUTPATIENT
Start: 2025-07-18 | End: 2025-07-22

## 2025-07-18 RX ORDER — VANCOMYCIN HYDROCHLORIDE 125 MG/1
125 CAPSULE ORAL 4 TIMES DAILY
Status: DISCONTINUED | OUTPATIENT
Start: 2025-07-18 | End: 2025-07-22

## 2025-07-18 NOTE — PLAN OF CARE
Continued watery green stool. Flexiseal in place. Large leak x 1. Otherwise small leak. Decreasing stool output. Perineum redness improved with medicated powder. Continue turn q2hr. Legs up on pillows.     Problem: GASTROINTESTINAL - ADULT  Goal: Minimal or absence of nausea and vomiting  Description: INTERVENTIONS:  - Maintain adequate hydration with IV or PO as ordered and tolerated  - Nasogastric tube to low intermittent suction as ordered  - Evaluate effectiveness of ordered antiemetic medications  - Provide nonpharmacologic comfort measures as appropriate  - Advance diet as tolerated, if ordered  - Obtain nutritional consult as needed  - Evaluate fluid balance  Outcome: Completed  Goal: Maintains or returns to baseline bowel function  Description: INTERVENTIONS:  - Assess bowel function  - Maintain adequate hydration with IV or PO as ordered and tolerated  - Evaluate effectiveness of GI medications  - Encourage mobilization and activity  - Obtain nutritional consult as needed  - Establish a toileting routine/schedule  - Consider collaborating with pharmacy to review patient's medication profile  Outcome: Progressing
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
## 2025-07-18 NOTE — PLAN OF CARE
Problem: Patient Centered Care  Goal: Patient preferences are identified and integrated in the patient's plan of care  Description: Interventions:  - What would you like us to know as we care for you? From Abigail Nieto  - Provide timely, complete, and accurate information to patient/family  - Incorporate patient and family knowledge, values, beliefs, and cultural backgrounds into the planning and delivery of care  - Encourage patient/family to participate in care and decision-making at the level they choose  - Honor patient and family perspectives and choices  Outcome: Progressing     Problem: PAIN - ADULT  Goal: Verbalizes/displays adequate comfort level or patient's stated pain goal  Description: INTERVENTIONS:  - Encourage pt to monitor pain and request assistance  - Assess pain using appropriate pain scale  - Administer analgesics based on type and severity of pain and evaluate response  - Implement non-pharmacological measures as appropriate and evaluate response  - Consider cultural and social influences on pain and pain management  - Manage/alleviate anxiety  - Utilize distraction and/or relaxation techniques  - Monitor for opioid side effects  - Notify MD/LIP if interventions unsuccessful or patient reports new pain  - Anticipate increased pain with activity and pre-medicate as appropriate  Outcome: Progressing     Problem: RISK FOR INFECTION - ADULT  Goal: Absence of fever/infection during anticipated neutropenic period  Description: INTERVENTIONS  - Monitor WBC  - Administer growth factors as ordered  - Implement neutropenic guidelines  Outcome: Progressing     Problem: SAFETY ADULT - FALL  Goal: Free from fall injury  Description: INTERVENTIONS:  - Assess pt frequently for physical needs  - Identify cognitive and physical deficits and behaviors that affect risk of falls.  - [place] fall precautions as indicated by assessment.  - Educate pt/family on patient safety including physical limitations  -  Instruct pt to call for assistance with activity based on assessment  - Modify environment to reduce risk of injury  - Provide assistive devices as appropriate  - Consider OT/PT consult to assist with strengthening/mobility  - Encourage toileting schedule  Outcome: Progressing     Problem: GASTROINTESTINAL - ADULT  Goal: Maintains or returns to baseline bowel function  Description: INTERVENTIONS:  - Assess bowel function  - Maintain adequate hydration with IV or PO as ordered and tolerated  - Evaluate effectiveness of GI medications  - Encourage mobilization and activity  - Obtain nutritional consult as needed  - Establish a toileting routine/schedule  - Consider collaborating with pharmacy to review patient's medication profile  Outcome: Progressing     Problem: SKIN/TISSUE INTEGRITY - ADULT  Goal: Skin integrity remains intact  Description: INTERVENTIONS  - Assess and document risk factors for pressure ulcer development  - Assess and document skin integrity  - Monitor for areas of redness and/or skin breakdown  - Initiate interventions, skin care algorithm/standards of care as needed  Outcome: Progressing  Goal: Incision(s), wounds(s) or drain site(s) healing without S/S of infection  Description: INTERVENTIONS:  - Assess and document risk factors for pressure ulcer development  - Assess and document skin integrity  - Assess and document dressing/incision, wound bed, drain sites and surrounding tissue  - Implement wound care per orders  - Initiate isolation precautions as appropriate  - Initiate Pressure Ulcer prevention bundle as indicated  Outcome: Progressing
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
## 2025-07-18 NOTE — CM/SW NOTE
Patient  discussed in RN DC rounds. SW provided MAT list to pt.  came in in the room.  informed SW that he visited [facility], who claimed that they didn't get the referral. SW re faxed the referral to F: [phone], and call liaison, no answer, left voicemail.     430pm- HAYDE followed up with patient and spouse in regards to MAT choice- [facility] or [place]. At this time, spouse and patient does not have MAT choice. Spouse asked if SW heard back from [facility], SW informed him no SW has not despite voicemail, re fax referral via aidin and sent referral to them in aidin. HAYDE informed patient and spouse that SW will need a choice this weekend, as they are aware that a SW/CM will follow up with them for MAT choice.     SW/CM to remain available for support and/or discharge planning.     Maira YOUNG, MSW, LSW   x [phone]

## 2025-07-18 NOTE — PLAN OF CARE
Problem: Patient Centered Care  Goal: Patient preferences are identified and integrated in the patient's plan of care  Description: Interventions:  - What would you like us to know as we care for you? From Abigail Nieto  - Provide timely, complete, and accurate information to patient/family  - Incorporate patient and family knowledge, values, beliefs, and cultural backgrounds into the planning and delivery of care  - Encourage patient/family to participate in care and decision-making at the level they choose  - Honor patient and family perspectives and choices  Outcome: Progressing     Problem: Patient/Family Goals  Goal: Patient/Family Long Term Goal  Description: Patient's Long Term Goal: Get better    Interventions:  - Follow healthcare team, treatment plan  -Monitor labs, Vitals & dianostics test  -Pain management.  - Diet recommendation.   -Partipate in therapy.  -Discharge planning    - See additional Care Plan goals for specific interventions  Outcome: Progressing  Goal: Patient/Family Short Term Goal  Description: Patient's Short Term Goal: Diarrhea free    Interventions:   - Monitor labs, vitals and diagnostic tests.  -Pain management, pharmacological interventions  -Diet recommendations  -Adequate oral intake   -Administer medications per order  -Follow MD orders  -Diagnostics per order  -Update/inform patient and family on plan of care  -Monitor appropriate labs    - See additional Care Plan goals for specific interventions  Outcome: Progressing     Problem: PAIN - ADULT  Goal: Verbalizes/displays adequate comfort level or patient's stated pain goal  Description: INTERVENTIONS:  - Encourage pt to monitor pain and request assistance  - Assess pain using appropriate pain scale  - Administer analgesics based on type and severity of pain and evaluate response  - Implement non-pharmacological measures as appropriate and evaluate response  - Consider cultural and social influences on pain and pain  management  - Manage/alleviate anxiety  - Utilize distraction and/or relaxation techniques  - Monitor for opioid side effects  - Notify MD/LIP if interventions unsuccessful or patient reports new pain  - Anticipate increased pain with activity and pre-medicate as appropriate  Outcome: Progressing     Problem: RISK FOR INFECTION - ADULT  Goal: Absence of fever/infection during anticipated neutropenic period  Description: INTERVENTIONS  - Monitor WBC  - Administer growth factors as ordered  - Implement neutropenic guidelines  Outcome: Progressing     Problem: SAFETY ADULT - FALL  Goal: Free from fall injury  Description: INTERVENTIONS:  - Assess pt frequently for physical needs  - Identify cognitive and physical deficits and behaviors that affect risk of falls.  - [place] fall precautions as indicated by assessment.  - Educate pt/family on patient safety including physical limitations  - Instruct pt to call for assistance with activity based on assessment  - Modify environment to reduce risk of injury  - Provide assistive devices as appropriate  - Consider OT/PT consult to assist with strengthening/mobility  - Encourage toileting schedule  Outcome: Progressing     Problem: GASTROINTESTINAL - ADULT  Goal: Maintains or returns to baseline bowel function  Description: INTERVENTIONS:  - Assess bowel function  - Maintain adequate hydration with IV or PO as ordered and tolerated  - Evaluate effectiveness of GI medications  - Encourage mobilization and activity  - Obtain nutritional consult as needed  - Establish a toileting routine/schedule  - Consider collaborating with pharmacy to review patient's medication profile  Outcome: Progressing     Problem: SKIN/TISSUE INTEGRITY - ADULT  Goal: Skin integrity remains intact  Description: INTERVENTIONS  - Assess and document risk factors for pressure ulcer development  - Assess and document skin integrity  - Monitor for areas of redness and/or skin breakdown  - Initiate  interventions, skin care algorithm/standards of care as needed  Outcome: Progressing  Goal: Incision(s), wounds(s) or drain site(s) healing without S/S of infection  Description: INTERVENTIONS:  - Assess and document risk factors for pressure ulcer development  - Assess and document skin integrity  - Assess and document dressing/incision, wound bed, drain sites and surrounding tissue  - Implement wound care per orders  - Initiate isolation precautions as appropriate  - Initiate Pressure Ulcer prevention bundle as indicated  Outcome: Progressing

## 2025-07-19 LAB
ANION GAP SERPL CALC-SCNC: 8 MMOL/L (ref 0–18)
BASOPHILS # BLD AUTO: 0.06 X10(3) UL (ref 0–0.2)
BASOPHILS NFR BLD AUTO: 1.2 %
BUN BLD-MCNC: <5 MG/DL (ref 9–23)
CALCIUM BLD-MCNC: 7.2 MG/DL (ref 8.7–10.4)
CHLORIDE SERPL-SCNC: 114 MMOL/L (ref 98–112)
CO2 SERPL-SCNC: 19 MMOL/L (ref 21–32)
CREAT BLD-MCNC: 0.39 MG/DL (ref 0.55–1.02)
DEPRECATED RDW RBC AUTO: 50.9 FL (ref 35.1–46.3)
EGFRCR SERPLBLD CKD-EPI 2021: 106 ML/MIN/1.73M2 (ref 60–?)
EOSINOPHIL # BLD AUTO: 0.36 X10(3) UL (ref 0–0.7)
EOSINOPHIL NFR BLD AUTO: 7.3 %
ERYTHROCYTE [DISTWIDTH] IN BLOOD BY AUTOMATED COUNT: 14.5 % (ref 11–15)
GLUCOSE BLD-MCNC: 79 MG/DL (ref 70–99)
HCT VFR BLD AUTO: 31.2 % (ref 35–48)
HGB BLD-MCNC: 10.4 G/DL (ref 12–16)
IMM GRANULOCYTES # BLD AUTO: 0.04 X10(3) UL (ref 0–1)
IMM GRANULOCYTES NFR BLD: 0.8 %
LYMPHOCYTES # BLD AUTO: 0.8 X10(3) UL (ref 1–4)
LYMPHOCYTES NFR BLD AUTO: 16.3 %
MAGNESIUM SERPL-MCNC: 1.5 MG/DL (ref 1.6–2.6)
MCH RBC QN AUTO: 32 PG (ref 26–34)
MCHC RBC AUTO-ENTMCNC: 33.3 G/DL (ref 31–37)
MCV RBC AUTO: 96 FL (ref 80–100)
MONOCYTES # BLD AUTO: 0.7 X10(3) UL (ref 0.1–1)
MONOCYTES NFR BLD AUTO: 14.3 %
NEUTROPHILS # BLD AUTO: 2.94 X10 (3) UL (ref 1.5–7.7)
NEUTROPHILS # BLD AUTO: 2.94 X10(3) UL (ref 1.5–7.7)
NEUTROPHILS NFR BLD AUTO: 60.1 %
PLATELET # BLD AUTO: 204 10(3)UL (ref 150–450)
POTASSIUM SERPL-SCNC: 3.2 MMOL/L (ref 3.5–5.1)
RBC # BLD AUTO: 3.25 X10(6)UL (ref 3.8–5.3)
SODIUM SERPL-SCNC: 141 MMOL/L (ref 136–145)
WBC # BLD AUTO: 4.9 X10(3) UL (ref 4–11)

## 2025-07-19 PROCEDURE — 99233 SBSQ HOSP IP/OBS HIGH 50: CPT | Performed by: HOSPITALIST

## 2025-07-19 RX ORDER — POTASSIUM CHLORIDE 1500 MG/1
40 TABLET, EXTENDED RELEASE ORAL EVERY 4 HOURS
Status: DISCONTINUED | OUTPATIENT
Start: 2025-07-19 | End: 2025-07-19
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
## 2025-07-19 NOTE — PLAN OF CARE
Problem: Patient Centered Care  Goal: Patient preferences are identified and integrated in the patient's plan of care  Description: Interventions:  - What would you like us to know as we care for you? From Abigail Nieto  - Provide timely, complete, and accurate information to patient/family  - Incorporate patient and family knowledge, values, beliefs, and cultural backgrounds into the planning and delivery of care  - Encourage patient/family to participate in care and decision-making at the level they choose  - Honor patient and family perspectives and choices  Outcome: Progressing     Problem: PAIN - ADULT  Goal: Verbalizes/displays adequate comfort level or patient's stated pain goal  Description: INTERVENTIONS:  - Encourage pt to monitor pain and request assistance  - Assess pain using appropriate pain scale  - Administer analgesics based on type and severity of pain and evaluate response  - Implement non-pharmacological measures as appropriate and evaluate response  - Consider cultural and social influences on pain and pain management  - Manage/alleviate anxiety  - Utilize distraction and/or relaxation techniques  - Monitor for opioid side effects  - Notify MD/LIP if interventions unsuccessful or patient reports new pain  - Anticipate increased pain with activity and pre-medicate as appropriate  Outcome: Progressing     Problem: SAFETY ADULT - FALL  Goal: Free from fall injury  Description: INTERVENTIONS:  - Assess pt frequently for physical needs  - Identify cognitive and physical deficits and behaviors that affect risk of falls.  - [place] fall precautions as indicated by assessment.  - Educate pt/family on patient safety including physical limitations  - Instruct pt to call for assistance with activity based on assessment  - Modify environment to reduce risk of injury  - Provide assistive devices as appropriate  - Consider OT/PT consult to assist with strengthening/mobility  - Encourage toileting  schedule  Outcome: Progressing     Problem: GASTROINTESTINAL - ADULT  Goal: Maintains or returns to baseline bowel function  Description: INTERVENTIONS:  - Assess bowel function  - Maintain adequate hydration with IV or PO as ordered and tolerated  - Evaluate effectiveness of GI medications  - Encourage mobilization and activity  - Obtain nutritional consult as needed  - Establish a toileting routine/schedule  - Consider collaborating with pharmacy to review patient's medication profile  Outcome: Progressing

## 2025-07-20 LAB
ANION GAP SERPL CALC-SCNC: 9 MMOL/L (ref 0–18)
BASOPHILS # BLD AUTO: 0.05 X10(3) UL (ref 0–0.2)
BASOPHILS NFR BLD AUTO: 1.1 %
BUN BLD-MCNC: <5 MG/DL (ref 9–23)
CALCIUM BLD-MCNC: 7.7 MG/DL (ref 8.7–10.4)
CHLORIDE SERPL-SCNC: 114 MMOL/L (ref 98–112)
CO2 SERPL-SCNC: 19 MMOL/L (ref 21–32)
CREAT BLD-MCNC: 0.48 MG/DL (ref 0.55–1.02)
DEPRECATED RDW RBC AUTO: 52.2 FL (ref 35.1–46.3)
EGFRCR SERPLBLD CKD-EPI 2021: 101 ML/MIN/1.73M2 (ref 60–?)
EOSINOPHIL # BLD AUTO: 0.29 X10(3) UL (ref 0–0.7)
EOSINOPHIL NFR BLD AUTO: 6.6 %
ERYTHROCYTE [DISTWIDTH] IN BLOOD BY AUTOMATED COUNT: 14.8 % (ref 11–15)
GLUCOSE BLD-MCNC: 93 MG/DL (ref 70–99)
HCT VFR BLD AUTO: 30.3 % (ref 35–48)
HGB BLD-MCNC: 10.1 G/DL (ref 12–16)
IMM GRANULOCYTES # BLD AUTO: 0.11 X10(3) UL (ref 0–1)
IMM GRANULOCYTES NFR BLD: 2.5 %
LYMPHOCYTES # BLD AUTO: 0.79 X10(3) UL (ref 1–4)
LYMPHOCYTES NFR BLD AUTO: 18 %
MAGNESIUM SERPL-MCNC: 1.4 MG/DL (ref 1.6–2.6)
MCH RBC QN AUTO: 32.3 PG (ref 26–34)
MCHC RBC AUTO-ENTMCNC: 33.3 G/DL (ref 31–37)
MCV RBC AUTO: 96.8 FL (ref 80–100)
MONOCYTES # BLD AUTO: 0.59 X10(3) UL (ref 0.1–1)
MONOCYTES NFR BLD AUTO: 13.4 %
NEUTROPHILS # BLD AUTO: 2.56 X10 (3) UL (ref 1.5–7.7)
NEUTROPHILS # BLD AUTO: 2.56 X10(3) UL (ref 1.5–7.7)
NEUTROPHILS NFR BLD AUTO: 58.4 %
PHOSPHATE SERPL-MCNC: 2.6 MG/DL (ref 2.4–5.1)
PLATELET # BLD AUTO: 218 10(3)UL (ref 150–450)
POTASSIUM SERPL-SCNC: 3.4 MMOL/L (ref 3.5–5.1)
POTASSIUM SERPL-SCNC: 3.4 MMOL/L (ref 3.5–5.1)
RBC # BLD AUTO: 3.13 X10(6)UL (ref 3.8–5.3)
SODIUM SERPL-SCNC: 142 MMOL/L (ref 136–145)
WBC # BLD AUTO: 4.4 X10(3) UL (ref 4–11)

## 2025-07-20 PROCEDURE — 99233 SBSQ HOSP IP/OBS HIGH 50: CPT | Performed by: HOSPITALIST

## 2025-07-20 NOTE — PLAN OF CARE
Problem: Patient Centered Care  Goal: Patient preferences are identified and integrated in the patient's plan of care  Description: Interventions:  - What would you like us to know as we care for you? From Abigail Nieto  - Provide timely, complete, and accurate information to patient/family  - Incorporate patient and family knowledge, values, beliefs, and cultural backgrounds into the planning and delivery of care  - Encourage patient/family to participate in care and decision-making at the level they choose  - Honor patient and family perspectives and choices  Outcome: Progressing     Problem: Patient/Family Goals  Goal: Patient/Family Long Term Goal  Description: Patient's Long Term Goal: Get better    Interventions:  - Follow healthcare team, treatment plan  -Monitor labs, Vitals & dianostics test  -Pain management.  - Diet recommendation.   -Partipate in therapy.  -Discharge planning    - See additional Care Plan goals for specific interventions  Outcome: Progressing  Goal: Patient/Family Short Term Goal  Description: Patient's Short Term Goal: Diarrhea free    Interventions:   - Monitor labs, vitals and diagnostic tests.  -Pain management, pharmacological interventions  -Diet recommendations  -Adequate oral intake   -Administer medications per order  -Follow MD orders  -Diagnostics per order  -Update/inform patient and family on plan of care  -Monitor appropriate labs    - See additional Care Plan goals for specific interventions  Outcome: Progressing     Problem: PAIN - ADULT  Goal: Verbalizes/displays adequate comfort level or patient's stated pain goal  Description: INTERVENTIONS:  - Encourage pt to monitor pain and request assistance  - Assess pain using appropriate pain scale  - Administer analgesics based on type and severity of pain and evaluate response  - Implement non-pharmacological measures as appropriate and evaluate response  - Consider cultural and social influences on pain and pain  management  - Manage/alleviate anxiety  - Utilize distraction and/or relaxation techniques  - Monitor for opioid side effects  - Notify MD/LIP if interventions unsuccessful or patient reports new pain  - Anticipate increased pain with activity and pre-medicate as appropriate  Outcome: Progressing     Problem: RISK FOR INFECTION - ADULT  Goal: Absence of fever/infection during anticipated neutropenic period  Description: INTERVENTIONS  - Monitor WBC  - Administer growth factors as ordered  - Implement neutropenic guidelines  Outcome: Progressing     Problem: SAFETY ADULT - FALL  Goal: Free from fall injury  Description: INTERVENTIONS:  - Assess pt frequently for physical needs  - Identify cognitive and physical deficits and behaviors that affect risk of falls.  - [place] fall precautions as indicated by assessment.  - Educate pt/family on patient safety including physical limitations  - Instruct pt to call for assistance with activity based on assessment  - Modify environment to reduce risk of injury  - Provide assistive devices as appropriate  - Consider OT/PT consult to assist with strengthening/mobility  - Encourage toileting schedule  Outcome: Progressing     Problem: GASTROINTESTINAL - ADULT  Goal: Maintains or returns to baseline bowel function  Description: INTERVENTIONS:  - Assess bowel function  - Maintain adequate hydration with IV or PO as ordered and tolerated  - Evaluate effectiveness of GI medications  - Encourage mobilization and activity  - Obtain nutritional consult as needed  - Establish a toileting routine/schedule  - Consider collaborating with pharmacy to review patient's medication profile  Outcome: Progressing     Problem: SKIN/TISSUE INTEGRITY - ADULT  Goal: Skin integrity remains intact  Description: INTERVENTIONS  - Assess and document risk factors for pressure ulcer development  - Assess and document skin integrity  - Monitor for areas of redness and/or skin breakdown  - Initiate  interventions, skin care algorithm/standards of care as needed  Outcome: Progressing  Goal: Incision(s), wounds(s) or drain site(s) healing without S/S of infection  Description: INTERVENTIONS:  - Assess and document risk factors for pressure ulcer development  - Assess and document skin integrity  - Assess and document dressing/incision, wound bed, drain sites and surrounding tissue  - Implement wound care per orders  - Initiate isolation precautions as appropriate  - Initiate Pressure Ulcer prevention bundle as indicated  Outcome: Progressing

## 2025-07-20 NOTE — PLAN OF CARE
Problem: Patient Centered Care  Goal: Patient preferences are identified and integrated in the patient's plan of care  Description: Interventions:  - What would you like us to know as we care for you? From Abigail Nieto  - Provide timely, complete, and accurate information to patient/family  - Incorporate patient and family knowledge, values, beliefs, and cultural backgrounds into the planning and delivery of care  - Encourage patient/family to participate in care and decision-making at the level they choose  - Honor patient and family perspectives and choices  Outcome: Progressing     Problem: Patient/Family Goals  Goal: Patient/Family Long Term Goal  Description: Patient's Long Term Goal: Discharge from the hospital    Interventions:  - Monitor vital signs  - Monitor appropriate labs  - Wound care as needed  - Pain management  - Administer medications per order  - Follow MD orders  - Diagnostics per order  - Update / inform patient and family on plan of care  - Discharge planning  - See additional Care Plan goals for specific interventions  Outcome: Progressing  Goal: Patient/Family Short Term Goal  Description: Patient's Short Term Goal: Improve diarrhea     Interventions:   - Monitor vital signs  - Monitor appropriate labs  - Wound care as needed  - Pain management  - Administer medications per order  - Follow MD orders  - Diagnostics per order  - Update / inform patient and family on plan of care  - See additional Care Plan goals for specific interventions  Outcome: Progressing     Problem: PAIN - ADULT  Goal: Verbalizes/displays adequate comfort level or patient's stated pain goal  Description: INTERVENTIONS:  - Encourage pt to monitor pain and request assistance  - Assess pain using appropriate pain scale  - Administer analgesics based on type and severity of pain and evaluate response  - Implement non-pharmacological measures as appropriate and evaluate response  - Consider cultural and social influences  on pain and pain management  - Manage/alleviate anxiety  - Utilize distraction and/or relaxation techniques  - Monitor for opioid side effects  - Notify MD/LIP if interventions unsuccessful or patient reports new pain  - Anticipate increased pain with activity and pre-medicate as appropriate  Outcome: Progressing     Problem: RISK FOR INFECTION - ADULT  Goal: Absence of fever/infection during anticipated neutropenic period  Description: INTERVENTIONS  - Monitor WBC  - Administer growth factors as ordered  - Implement neutropenic guidelines  Outcome: Progressing     Problem: SAFETY ADULT - FALL  Goal: Free from fall injury  Description: INTERVENTIONS:  - Assess pt frequently for physical needs  - Identify cognitive and physical deficits and behaviors that affect risk of falls.  - [place] fall precautions as indicated by assessment.  - Educate pt/family on patient safety including physical limitations  - Instruct pt to call for assistance with activity based on assessment  - Modify environment to reduce risk of injury  - Provide assistive devices as appropriate  - Consider OT/PT consult to assist with strengthening/mobility  - Encourage toileting schedule  Outcome: Progressing     Problem: GASTROINTESTINAL - ADULT  Goal: Maintains or returns to baseline bowel function  Description: INTERVENTIONS:  - Assess bowel function  - Maintain adequate hydration with IV or PO as ordered and tolerated  - Evaluate effectiveness of GI medications  - Encourage mobilization and activity  - Obtain nutritional consult as needed  - Establish a toileting routine/schedule  - Consider collaborating with pharmacy to review patient's medication profile  Outcome: Progressing  Goal: Minimal or absence of nausea and vomiting  Description: INTERVENTIONS:  - Maintain adequate hydration with IV or PO as ordered and tolerated  - Nasogastric tube to low intermittent suction as ordered  - Evaluate effectiveness of ordered antiemetic medications  -  Provide nonpharmacologic comfort measures as appropriate  - Advance diet as tolerated, if ordered  - Obtain nutritional consult as needed  - Evaluate fluid balance  Outcome: Progressing     Problem: SKIN/TISSUE INTEGRITY - ADULT  Goal: Skin integrity remains intact  Description: INTERVENTIONS  - Assess and document risk factors for pressure ulcer development  - Assess and document skin integrity  - Monitor for areas of redness and/or skin breakdown  - Initiate interventions, skin care algorithm/standards of care as needed  Outcome: Progressing  Goal: Incision(s), wounds(s) or drain site(s) healing without S/S of infection  Description: INTERVENTIONS:  - Assess and document risk factors for pressure ulcer development  - Assess and document skin integrity  - Assess and document dressing/incision, wound bed, drain sites and surrounding tissue  - Implement wound care per orders  - Initiate isolation precautions as appropriate  - Initiate Pressure Ulcer prevention bundle as indicated  Outcome: Progressing     Problem: DISCHARGE PLANNING  Goal: Discharge to home or other facility with appropriate resources  Description: INTERVENTIONS:  - Identify barriers to discharge w/pt and caregiver  - Include patient/family/discharge partner in discharge planning  - Arrange for needed discharge resources and transportation as appropriate  - Identify discharge learning needs (meds, wound care, etc)  - Arrange for interpreters to assist at discharge as needed  - Consider post-discharge preferences of patient/family/discharge partner  - Complete POLST form as appropriate  - Assess patient's ability to be responsible for managing their own health  - Refer to Case Management Department for coordinating discharge planning if the patient needs post-hospital services based on physician/LIP order or complex needs related to functional status, cognitive ability or social support system  Outcome: Progressing     Problem: MUSCULOSKELETAL -  ADULT  Goal: Return mobility to safest level of function  Description: INTERVENTIONS:  - Assess patient stability and activity tolerance for standing, transferring and ambulating w/ or w/o assistive devices  - Assist with transfers and ambulation using safe patient handling equipment as needed  - Ensure adequate protection for wounds/incisions during mobilization  - Obtain PT/OT consults as needed  - Advance activity as appropriate  - Communicate ordered activity level and limitations with patient/family  Outcome: Progressing     Problem: Impaired Functional Mobility  Goal: Achieve highest/safest level of mobility/gait  Description: Interventions:  - Assess patient's functional ability and stability  - Promote increasing activity/tolerance for mobility and gait  - Educate and engage patient/family in tolerated activity level and precautions  - Recommend use of sit-stand lift for transfers  - Recommend use of total lift for transfers  - Recommend use of  RW for transfers and ambulation  - Recommend patient transfer to bedside chair toward strongest side  - When transferring patient, block weaker knee for safety  - Recommend use of chair position in bed 3 times per day  Outcome: Progressing     Problem: Impaired Activities of Daily Living  Goal: Achieve highest/safest level of independence in self care  Description: Interventions:  - Assess ability and encourage patient to participate in ADLs to maximize function  - Promote sitting position while performing ADLs such as feeding, grooming, and bathing  - Educate and encourage patient/family in tolerated functional activity level and precautions during self-care  - Encourage patient to incorporate impaired side during daily activities to promote function  Outcome: Progressing
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
## 2025-07-20 NOTE — PLAN OF CARE
Problem: Patient Centered Care  Goal: Patient preferences are identified and integrated in the patient's plan of care  Description: Interventions:  - What would you like us to know as we care for you? From Abigail Nieto  - Provide timely, complete, and accurate information to patient/family  - Incorporate patient and family knowledge, values, beliefs, and cultural backgrounds into the planning and delivery of care  - Encourage patient/family to participate in care and decision-making at the level they choose  - Honor patient and family perspectives and choices  Outcome: Progressing     Problem: Patient/Family Goals  Goal: Patient/Family Long Term Goal  Description: Patient's Long Term Goal: Discharge from the hospital    Interventions:  - Monitor vital signs  - Monitor appropriate labs  - Wound care as needed  - Pain management  - Administer medications per order  - Follow MD orders  - Diagnostics per order  - Update / inform patient and family on plan of care  - Discharge planning  - See additional Care Plan goals for specific interventions  Outcome: Progressing  Goal: Patient/Family Short Term Goal  Description: Patient's Short Term Goal: Improve diarrhea     Interventions:   - Monitor vital signs  - Monitor appropriate labs  - Wound care as needed  - Pain management  - Administer medications per order  - Follow MD orders  - Diagnostics per order  - Update / inform patient and family on plan of care  - See additional Care Plan goals for specific interventions  Outcome: Progressing     Problem: PAIN - ADULT  Goal: Verbalizes/displays adequate comfort level or patient's stated pain goal  Description: INTERVENTIONS:  - Encourage pt to monitor pain and request assistance  - Assess pain using appropriate pain scale  - Administer analgesics based on type and severity of pain and evaluate response  - Implement non-pharmacological measures as appropriate and evaluate response  - Consider cultural and social influences  on pain and pain management  - Manage/alleviate anxiety  - Utilize distraction and/or relaxation techniques  - Monitor for opioid side effects  - Notify MD/LIP if interventions unsuccessful or patient reports new pain  - Anticipate increased pain with activity and pre-medicate as appropriate  Outcome: Progressing     Problem: RISK FOR INFECTION - ADULT  Goal: Absence of fever/infection during anticipated neutropenic period  Description: INTERVENTIONS  - Monitor WBC  - Administer growth factors as ordered  - Implement neutropenic guidelines  Outcome: Progressing     Problem: SAFETY ADULT - FALL  Goal: Free from fall injury  Description: INTERVENTIONS:  - Assess pt frequently for physical needs  - Identify cognitive and physical deficits and behaviors that affect risk of falls.  - [place] fall precautions as indicated by assessment.  - Educate pt/family on patient safety including physical limitations  - Instruct pt to call for assistance with activity based on assessment  - Modify environment to reduce risk of injury  - Provide assistive devices as appropriate  - Consider OT/PT consult to assist with strengthening/mobility  - Encourage toileting schedule  Outcome: Progressing     Problem: GASTROINTESTINAL - ADULT  Goal: Maintains or returns to baseline bowel function  Description: INTERVENTIONS:  - Assess bowel function  - Maintain adequate hydration with IV or PO as ordered and tolerated  - Evaluate effectiveness of GI medications  - Encourage mobilization and activity  - Obtain nutritional consult as needed  - Establish a toileting routine/schedule  - Consider collaborating with pharmacy to review patient's medication profile  Outcome: Progressing  Goal: Minimal or absence of nausea and vomiting  Description: INTERVENTIONS:  - Maintain adequate hydration with IV or PO as ordered and tolerated  - Nasogastric tube to low intermittent suction as ordered  - Evaluate effectiveness of ordered antiemetic medications  -  Provide nonpharmacologic comfort measures as appropriate  - Advance diet as tolerated, if ordered  - Obtain nutritional consult as needed  - Evaluate fluid balance  Outcome: Progressing     Problem: SKIN/TISSUE INTEGRITY - ADULT  Goal: Skin integrity remains intact  Description: INTERVENTIONS  - Assess and document risk factors for pressure ulcer development  - Assess and document skin integrity  - Monitor for areas of redness and/or skin breakdown  - Initiate interventions, skin care algorithm/standards of care as needed  Outcome: Progressing  Goal: Incision(s), wounds(s) or drain site(s) healing without S/S of infection  Description: INTERVENTIONS:  - Assess and document risk factors for pressure ulcer development  - Assess and document skin integrity  - Assess and document dressing/incision, wound bed, drain sites and surrounding tissue  - Implement wound care per orders  - Initiate isolation precautions as appropriate  - Initiate Pressure Ulcer prevention bundle as indicated  Outcome: Progressing     Problem: DISCHARGE PLANNING  Goal: Discharge to home or other facility with appropriate resources  Description: INTERVENTIONS:  - Identify barriers to discharge w/pt and caregiver  - Include patient/family/discharge partner in discharge planning  - Arrange for needed discharge resources and transportation as appropriate  - Identify discharge learning needs (meds, wound care, etc)  - Arrange for interpreters to assist at discharge as needed  - Consider post-discharge preferences of patient/family/discharge partner  - Complete POLST form as appropriate  - Assess patient's ability to be responsible for managing their own health  - Refer to Case Management Department for coordinating discharge planning if the patient needs post-hospital services based on physician/LIP order or complex needs related to functional status, cognitive ability or social support system  Outcome: Progressing     Problem: MUSCULOSKELETAL -  ADULT  Goal: Return mobility to safest level of function  Description: INTERVENTIONS:  - Assess patient stability and activity tolerance for standing, transferring and ambulating w/ or w/o assistive devices  - Assist with transfers and ambulation using safe patient handling equipment as needed  - Ensure adequate protection for wounds/incisions during mobilization  - Obtain PT/OT consults as needed  - Advance activity as appropriate  - Communicate ordered activity level and limitations with patient/family  Outcome: Progressing     Problem: Impaired Functional Mobility  Goal: Achieve highest/safest level of mobility/gait  Description: Interventions:  - Assess patient's functional ability and stability  - Promote increasing activity/tolerance for mobility and gait  - Educate and engage patient/family in tolerated activity level and precautions  - Recommend use of sit-stand lift for transfers  - Recommend use of total lift for transfers  - Recommend use of  RW for transfers and ambulation  - Recommend patient transfer to bedside chair toward strongest side  - When transferring patient, block weaker knee for safety  - Recommend use of chair position in bed 3 times per day  Outcome: Progressing     Problem: Impaired Activities of Daily Living  Goal: Achieve highest/safest level of independence in self care  Description: Interventions:  - Assess ability and encourage patient to participate in ADLs to maximize function  - Promote sitting position while performing ADLs such as feeding, grooming, and bathing  - Educate and encourage patient/family in tolerated functional activity level and precautions during self-care  - Encourage patient to incorporate impaired side during daily activities to promote function  Outcome: Progressing

## 2025-07-21 LAB
ANION GAP SERPL CALC-SCNC: 7 MMOL/L (ref 0–18)
BASOPHILS # BLD AUTO: 0.04 X10(3) UL (ref 0–0.2)
BASOPHILS NFR BLD AUTO: 1.2 %
BUN BLD-MCNC: <5 MG/DL (ref 9–23)
CALCIUM BLD-MCNC: 7.5 MG/DL (ref 8.7–10.4)
CHLORIDE SERPL-SCNC: 114 MMOL/L (ref 98–112)
CO2 SERPL-SCNC: 20 MMOL/L (ref 21–32)
CREAT BLD-MCNC: 0.41 MG/DL (ref 0.55–1.02)
DEPRECATED RDW RBC AUTO: 53.5 FL (ref 35.1–46.3)
EGFRCR SERPLBLD CKD-EPI 2021: 105 ML/MIN/1.73M2 (ref 60–?)
EOSINOPHIL # BLD AUTO: 0.32 X10(3) UL (ref 0–0.7)
EOSINOPHIL NFR BLD AUTO: 9.4 %
ERYTHROCYTE [DISTWIDTH] IN BLOOD BY AUTOMATED COUNT: 15.1 % (ref 11–15)
GLUCOSE BLD-MCNC: 79 MG/DL (ref 70–99)
HCT VFR BLD AUTO: 31.9 % (ref 35–48)
HGB BLD-MCNC: 10.3 G/DL (ref 12–16)
IMM GRANULOCYTES # BLD AUTO: 0.1 X10(3) UL (ref 0–1)
IMM GRANULOCYTES NFR BLD: 2.9 %
LYMPHOCYTES # BLD AUTO: 0.98 X10(3) UL (ref 1–4)
LYMPHOCYTES NFR BLD AUTO: 28.9 %
MAGNESIUM SERPL-MCNC: 1.4 MG/DL (ref 1.6–2.6)
MCH RBC QN AUTO: 31.3 PG (ref 26–34)
MCHC RBC AUTO-ENTMCNC: 32.3 G/DL (ref 31–37)
MCV RBC AUTO: 97 FL (ref 80–100)
MONOCYTES # BLD AUTO: 0.58 X10(3) UL (ref 0.1–1)
MONOCYTES NFR BLD AUTO: 17.1 %
NEUTROPHILS # BLD AUTO: 1.37 X10 (3) UL (ref 1.5–7.7)
NEUTROPHILS # BLD AUTO: 1.37 X10(3) UL (ref 1.5–7.7)
NEUTROPHILS NFR BLD AUTO: 40.5 %
PLATELET # BLD AUTO: 219 10(3)UL (ref 150–450)
POTASSIUM SERPL-SCNC: 3.1 MMOL/L (ref 3.5–5.1)
POTASSIUM SERPL-SCNC: 3.1 MMOL/L (ref 3.5–5.1)
POTASSIUM SERPL-SCNC: 3.4 MMOL/L (ref 3.5–5.1)
RBC # BLD AUTO: 3.29 X10(6)UL (ref 3.8–5.3)
SODIUM SERPL-SCNC: 141 MMOL/L (ref 136–145)
WBC # BLD AUTO: 3.4 X10(3) UL (ref 4–11)

## 2025-07-21 PROCEDURE — 99233 SBSQ HOSP IP/OBS HIGH 50: CPT | Performed by: HOSPITALIST

## 2025-07-21 RX ORDER — VANCOMYCIN HYDROCHLORIDE 125 MG/1
CAPSULE ORAL
Qty: 98 CAPSULE | Refills: 0 | Status: SHIPPED | OUTPATIENT
Start: 2025-07-21 | End: 2025-08-25

## 2025-07-21 RX ORDER — POTASSIUM CHLORIDE 14.9 MG/ML
20 INJECTION INTRAVENOUS ONCE
Status: COMPLETED | OUTPATIENT
Start: 2025-07-21 | End: 2025-07-21

## 2025-07-21 RX ORDER — MAGNESIUM SULFATE 1 G/100ML
1 INJECTION INTRAVENOUS ONCE
Status: COMPLETED | OUTPATIENT
Start: 2025-07-21 | End: 2025-07-21
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
## 2025-07-21 NOTE — CM/SW NOTE
Patient discussed in RN DC rounds. SW followed up with patient and spouse in regards to choice. Patient and spouse wishes to return back to [place]. [facility] was updated of above. SW sent updated clinicals via [facility].     Plan: Pending medical clearance, DC to [facility], *bed avail    SW/THEO to remain available for support and/or discharge planning.     Maira YOUNG, MSW, LSW   x [phone]

## 2025-07-21 NOTE — PLAN OF CARE
Pt alert, oriented. Vitals stable. No complaints of pain. Poor appetite. Plan for discharge tomorrow   Problem: Patient Centered Care  Goal: Patient preferences are identified and integrated in the patient's plan of care  Description: Interventions:  - What would you like us to know as we care for you? From Abigail Nieto  - Provide timely, complete, and accurate information to patient/family  - Incorporate patient and family knowledge, values, beliefs, and cultural backgrounds into the planning and delivery of care  - Encourage patient/family to participate in care and decision-making at the level they choose  - Honor patient and family perspectives and choices  Outcome: Progressing     Problem: Patient/Family Goals  Goal: Patient/Family Long Term Goal  Description: Patient's Long Term Goal: Discharge from the hospital    Interventions:  - Monitor vital signs  - Monitor appropriate labs  - Wound care as needed  - Pain management  - Administer medications per order  - Follow MD orders  - Diagnostics per order  - Update / inform patient and family on plan of care  - Discharge planning  - See additional Care Plan goals for specific interventions  Outcome: Progressing  Goal: Patient/Family Short Term Goal  Description: Patient's Short Term Goal: Improve diarrhea     Interventions:   - Monitor vital signs  - Monitor appropriate labs  - Wound care as needed  - Pain management  - Administer medications per order  - Follow MD orders  - Diagnostics per order  - Update / inform patient and family on plan of care  - See additional Care Plan goals for specific interventions  Outcome: Progressing     Problem: PAIN - ADULT  Goal: Verbalizes/displays adequate comfort level or patient's stated pain goal  Description: INTERVENTIONS:  - Encourage pt to monitor pain and request assistance  - Assess pain using appropriate pain scale  - Administer analgesics based on type and severity of pain and evaluate response  - Implement  non-pharmacological measures as appropriate and evaluate response  - Consider cultural and social influences on pain and pain management  - Manage/alleviate anxiety  - Utilize distraction and/or relaxation techniques  - Monitor for opioid side effects  - Notify MD/LIP if interventions unsuccessful or patient reports new pain  - Anticipate increased pain with activity and pre-medicate as appropriate  Outcome: Progressing     Problem: SAFETY ADULT - FALL  Goal: Free from fall injury  Description: INTERVENTIONS:  - Assess pt frequently for physical needs  - Identify cognitive and physical deficits and behaviors that affect risk of falls.  - [place] fall precautions as indicated by assessment.  - Educate pt/family on patient safety including physical limitations  - Instruct pt to call for assistance with activity based on assessment  - Modify environment to reduce risk of injury  - Provide assistive devices as appropriate  - Consider OT/PT consult to assist with strengthening/mobility  - Encourage toileting schedule  Outcome: Progressing     Problem: GASTROINTESTINAL - ADULT  Goal: Maintains or returns to baseline bowel function  Description: INTERVENTIONS:  - Assess bowel function  - Maintain adequate hydration with IV or PO as ordered and tolerated  - Evaluate effectiveness of GI medications  - Encourage mobilization and activity  - Obtain nutritional consult as needed  - Establish a toileting routine/schedule  - Consider collaborating with pharmacy to review patient's medication profile  Outcome: Progressing  Goal: Minimal or absence of nausea and vomiting  Description: INTERVENTIONS:  - Maintain adequate hydration with IV or PO as ordered and tolerated  - Nasogastric tube to low intermittent suction as ordered  - Evaluate effectiveness of ordered antiemetic medications  - Provide nonpharmacologic comfort measures as appropriate  - Advance diet as tolerated, if ordered  - Obtain nutritional consult as needed  -  Evaluate fluid balance  Outcome: Progressing     Problem: SKIN/TISSUE INTEGRITY - ADULT  Goal: Skin integrity remains intact  Description: INTERVENTIONS  - Assess and document risk factors for pressure ulcer development  - Assess and document skin integrity  - Monitor for areas of redness and/or skin breakdown  - Initiate interventions, skin care algorithm/standards of care as needed  Outcome: Progressing  Goal: Incision(s), wounds(s) or drain site(s) healing without S/S of infection  Description: INTERVENTIONS:  - Assess and document risk factors for pressure ulcer development  - Assess and document skin integrity  - Assess and document dressing/incision, wound bed, drain sites and surrounding tissue  - Implement wound care per orders  - Initiate isolation precautions as appropriate  - Initiate Pressure Ulcer prevention bundle as indicated  Outcome: Progressing

## 2025-07-21 NOTE — SPIRITUAL CARE NOTE
Spiritual Care Visit Note    Patient Name: Sonali Snider Date of Spiritual Care Visit: 25   : 1955 Primary Dx: Acute cystitis without hematuria       Referred By: Referral From: , LOS    Spiritual Care Taxonomy:    Intended Effects: Promote a sense of peace    Methods: Collaborate with care team member         Visit Type/Summary:     received a referral for a visit from RICA marin.   - Spiritual Care: Patient declined a  visit at this time.  remains available as needed for follow up.    Spiritual Care support can be requested via an [facility] consult.   For urgent/immediate needs, please contact the On Call  at: [place]: ext [phone]    -Chaplain Jessica.
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
## 2025-07-21 NOTE — PLAN OF CARE
Problem: Patient Centered Care  Goal: Patient preferences are identified and integrated in the patient's plan of care  Description: Interventions:  - What would you like us to know as we care for you? From Abigail Nieto  - Provide timely, complete, and accurate information to patient/family  - Incorporate patient and family knowledge, values, beliefs, and cultural backgrounds into the planning and delivery of care  - Encourage patient/family to participate in care and decision-making at the level they choose  - Honor patient and family perspectives and choices  Outcome: Progressing     Problem: Patient/Family Goals  Goal: Patient/Family Long Term Goal  Description: Patient's Long Term Goal: Discharge from the hospital    Interventions:  - Monitor vital signs  - Monitor appropriate labs  - Wound care as needed  - Pain management  - Administer medications per order  - Follow MD orders  - Diagnostics per order  - Update / inform patient and family on plan of care  - Discharge planning  - See additional Care Plan goals for specific interventions  Outcome: Progressing  Goal: Patient/Family Short Term Goal  Description: Patient's Short Term Goal: Improve diarrhea     Interventions:   - Monitor vital signs  - Monitor appropriate labs  - Wound care as needed  - Pain management  - Administer medications per order  - Follow MD orders  - Diagnostics per order  - Update / inform patient and family on plan of care  - See additional Care Plan goals for specific interventions  Outcome: Progressing     Problem: PAIN - ADULT  Goal: Verbalizes/displays adequate comfort level or patient's stated pain goal  Description: INTERVENTIONS:  - Encourage pt to monitor pain and request assistance  - Assess pain using appropriate pain scale  - Administer analgesics based on type and severity of pain and evaluate response  - Implement non-pharmacological measures as appropriate and evaluate response  - Consider cultural and social influences  on pain and pain management  - Manage/alleviate anxiety  - Utilize distraction and/or relaxation techniques  - Monitor for opioid side effects  - Notify MD/LIP if interventions unsuccessful or patient reports new pain  - Anticipate increased pain with activity and pre-medicate as appropriate  Outcome: Progressing     Problem: RISK FOR INFECTION - ADULT  Goal: Absence of fever/infection during anticipated neutropenic period  Description: INTERVENTIONS  - Monitor WBC  - Administer growth factors as ordered  - Implement neutropenic guidelines  Outcome: Progressing     Problem: SAFETY ADULT - FALL  Goal: Free from fall injury  Description: INTERVENTIONS:  - Assess pt frequently for physical needs  - Identify cognitive and physical deficits and behaviors that affect risk of falls.  - [place] fall precautions as indicated by assessment.  - Educate pt/family on patient safety including physical limitations  - Instruct pt to call for assistance with activity based on assessment  - Modify environment to reduce risk of injury  - Provide assistive devices as appropriate  - Consider OT/PT consult to assist with strengthening/mobility  - Encourage toileting schedule  Outcome: Progressing     Problem: GASTROINTESTINAL - ADULT  Goal: Maintains or returns to baseline bowel function  Description: INTERVENTIONS:  - Assess bowel function  - Maintain adequate hydration with IV or PO as ordered and tolerated  - Evaluate effectiveness of GI medications  - Encourage mobilization and activity  - Obtain nutritional consult as needed  - Establish a toileting routine/schedule  - Consider collaborating with pharmacy to review patient's medication profile  Outcome: Progressing  Goal: Minimal or absence of nausea and vomiting  Description: INTERVENTIONS:  - Maintain adequate hydration with IV or PO as ordered and tolerated  - Nasogastric tube to low intermittent suction as ordered  - Evaluate effectiveness of ordered antiemetic medications  -  Provide nonpharmacologic comfort measures as appropriate  - Advance diet as tolerated, if ordered  - Obtain nutritional consult as needed  - Evaluate fluid balance  Outcome: Progressing     Problem: SKIN/TISSUE INTEGRITY - ADULT  Goal: Skin integrity remains intact  Description: INTERVENTIONS  - Assess and document risk factors for pressure ulcer development  - Assess and document skin integrity  - Monitor for areas of redness and/or skin breakdown  - Initiate interventions, skin care algorithm/standards of care as needed  Outcome: Progressing  Goal: Incision(s), wounds(s) or drain site(s) healing without S/S of infection  Description: INTERVENTIONS:  - Assess and document risk factors for pressure ulcer development  - Assess and document skin integrity  - Assess and document dressing/incision, wound bed, drain sites and surrounding tissue  - Implement wound care per orders  - Initiate isolation precautions as appropriate  - Initiate Pressure Ulcer prevention bundle as indicated  Outcome: Progressing     Problem: DISCHARGE PLANNING  Goal: Discharge to home or other facility with appropriate resources  Description: INTERVENTIONS:  - Identify barriers to discharge w/pt and caregiver  - Include patient/family/discharge partner in discharge planning  - Arrange for needed discharge resources and transportation as appropriate  - Identify discharge learning needs (meds, wound care, etc)  - Arrange for interpreters to assist at discharge as needed  - Consider post-discharge preferences of patient/family/discharge partner  - Complete POLST form as appropriate  - Assess patient's ability to be responsible for managing their own health  - Refer to Case Management Department for coordinating discharge planning if the patient needs post-hospital services based on physician/LIP order or complex needs related to functional status, cognitive ability or social support system  Outcome: Progressing     Problem: MUSCULOSKELETAL -  ADULT  Goal: Return mobility to safest level of function  Description: INTERVENTIONS:  - Assess patient stability and activity tolerance for standing, transferring and ambulating w/ or w/o assistive devices  - Assist with transfers and ambulation using safe patient handling equipment as needed  - Ensure adequate protection for wounds/incisions during mobilization  - Obtain PT/OT consults as needed  - Advance activity as appropriate  - Communicate ordered activity level and limitations with patient/family  Outcome: Progressing     Problem: Impaired Functional Mobility  Goal: Achieve highest/safest level of mobility/gait  Description: Interventions:  - Assess patient's functional ability and stability  - Promote increasing activity/tolerance for mobility and gait  - Educate and engage patient/family in tolerated activity level and precautions  - Recommend use of sit-stand lift for transfers  - Recommend use of total lift for transfers  - Recommend use of  RW for transfers and ambulation  - Recommend patient transfer to bedside chair toward strongest side  - When transferring patient, block weaker knee for safety  - Recommend use of chair position in bed 3 times per day  Outcome: Progressing     Problem: Impaired Activities of Daily Living  Goal: Achieve highest/safest level of independence in self care  Description: Interventions:  - Assess ability and encourage patient to participate in ADLs to maximize function  - Promote sitting position while performing ADLs such as feeding, grooming, and bathing  - Educate and encourage patient/family in tolerated functional activity level and precautions during self-care  - Encourage patient to incorporate impaired side during daily activities to promote function  Outcome: Progressing

## 2025-07-22 VITALS
HEART RATE: 104 BPM | DIASTOLIC BLOOD PRESSURE: 53 MMHG | TEMPERATURE: 98 F | SYSTOLIC BLOOD PRESSURE: 109 MMHG | WEIGHT: 243.88 LBS | RESPIRATION RATE: 18 BRPM | OXYGEN SATURATION: 100 % | BODY MASS INDEX: 48 KG/M2

## 2025-07-22 LAB
ANION GAP SERPL CALC-SCNC: 8 MMOL/L (ref 0–18)
BASOPHILS # BLD AUTO: 0.06 X10(3) UL (ref 0–0.2)
BASOPHILS NFR BLD AUTO: 1.7 %
BUN BLD-MCNC: <5 MG/DL (ref 9–23)
CALCIUM BLD-MCNC: 7.6 MG/DL (ref 8.7–10.4)
CHLORIDE SERPL-SCNC: 112 MMOL/L (ref 98–112)
CO2 SERPL-SCNC: 21 MMOL/L (ref 21–32)
CREAT BLD-MCNC: 0.42 MG/DL (ref 0.55–1.02)
DEPRECATED RDW RBC AUTO: 51.7 FL (ref 35.1–46.3)
EGFRCR SERPLBLD CKD-EPI 2021: 105 ML/MIN/1.73M2 (ref 60–?)
EOSINOPHIL # BLD AUTO: 0.26 X10(3) UL (ref 0–0.7)
EOSINOPHIL NFR BLD AUTO: 7.3 %
ERYTHROCYTE [DISTWIDTH] IN BLOOD BY AUTOMATED COUNT: 15 % (ref 11–15)
GLUCOSE BLD-MCNC: 81 MG/DL (ref 70–99)
HCT VFR BLD AUTO: 32.1 % (ref 35–48)
HGB BLD-MCNC: 10.5 G/DL (ref 12–16)
IMM GRANULOCYTES # BLD AUTO: 0.1 X10(3) UL (ref 0–1)
IMM GRANULOCYTES NFR BLD: 2.8 %
LYMPHOCYTES # BLD AUTO: 0.93 X10(3) UL (ref 1–4)
LYMPHOCYTES NFR BLD AUTO: 26.2 %
MAGNESIUM SERPL-MCNC: 1.5 MG/DL (ref 1.6–2.6)
MCH RBC QN AUTO: 31.4 PG (ref 26–34)
MCHC RBC AUTO-ENTMCNC: 32.7 G/DL (ref 31–37)
MCV RBC AUTO: 96.1 FL (ref 80–100)
MONOCYTES # BLD AUTO: 0.67 X10(3) UL (ref 0.1–1)
MONOCYTES NFR BLD AUTO: 18.9 %
NEUTROPHILS # BLD AUTO: 1.53 X10 (3) UL (ref 1.5–7.7)
NEUTROPHILS # BLD AUTO: 1.53 X10(3) UL (ref 1.5–7.7)
NEUTROPHILS NFR BLD AUTO: 43.1 %
PLATELET # BLD AUTO: 231 10(3)UL (ref 150–450)
POTASSIUM SERPL-SCNC: 3.1 MMOL/L (ref 3.5–5.1)
RBC # BLD AUTO: 3.34 X10(6)UL (ref 3.8–5.3)
SODIUM SERPL-SCNC: 141 MMOL/L (ref 136–145)
WBC # BLD AUTO: 3.6 X10(3) UL (ref 4–11)

## 2025-07-22 PROCEDURE — 99239 HOSP IP/OBS DSCHRG MGMT >30: CPT | Performed by: HOSPITALIST

## 2025-07-22 RX ORDER — POTASSIUM CHLORIDE 14.9 MG/ML
20 INJECTION INTRAVENOUS ONCE
Status: COMPLETED | OUTPATIENT
Start: 2025-07-22 | End: 2025-07-22

## 2025-07-22 RX ORDER — TRAMADOL HYDROCHLORIDE 50 MG/1
50 TABLET ORAL DAILY
Qty: 10 TABLET | Refills: 0 | Status: SHIPPED | OUTPATIENT
Start: 2025-07-22

## 2025-07-22 RX ORDER — HYDROCODONE BITARTRATE AND ACETAMINOPHEN 5; 325 MG/1; MG/1
2 TABLET ORAL EVERY 4 HOURS PRN
Qty: 10 TABLET | Refills: 0 | Status: SHIPPED | OUTPATIENT
Start: 2025-07-22

## 2025-07-22 NOTE — PLAN OF CARE
Pt alert, oriented. Vitals stable. [facility]. Cleared for discharge  Problem: Patient Centered Care  Goal: Patient preferences are identified and integrated in the patient's plan of care  Description: Interventions:  - What would you like us to know as we care for you? From [facility]  - Provide timely, complete, and accurate information to patient/family  - Incorporate patient and family knowledge, values, beliefs, and cultural backgrounds into the planning and delivery of care  - Encourage patient/family to participate in care and decision-making at the level they choose  - Honor patient and family perspectives and choices  Outcome: Completed     Problem: Patient/Family Goals  Goal: Patient/Family Long Term Goal  Description: Patient's Long Term Goal: Discharge from the hospital    Interventions:  - Monitor vital signs  - Monitor appropriate labs  - Wound care as needed  - Pain management  - Administer medications per order  - Follow MD orders  - Diagnostics per order  - Update / inform patient and family on plan of care  - Discharge planning  - See additional Care Plan goals for specific interventions  Outcome: Completed  Goal: Patient/Family Short Term Goal  Description: Patient's Short Term Goal: Improve diarrhea     Interventions:   - Monitor vital signs  - Monitor appropriate labs  - Wound care as needed  - Pain management  - Administer medications per order  - Follow MD orders  - Diagnostics per order  - Update / inform patient and family on plan of care  - See additional Care Plan goals for specific interventions  Outcome: Completed     Problem: PAIN - ADULT  Goal: Verbalizes/displays adequate comfort level or patient's stated pain goal  Description: INTERVENTIONS:  - Encourage pt to monitor pain and request assistance  - Assess pain using appropriate pain scale  - Administer analgesics based on type and severity of pain and evaluate response  - Implement  non-pharmacological measures as appropriate and evaluate response  - Consider cultural and social influences on pain and pain management  - Manage/alleviate anxiety  - Utilize distraction and/or relaxation techniques  - Monitor for opioid side effects  - Notify MD/LIP if interventions unsuccessful or patient reports new pain  - Anticipate increased pain with activity and pre-medicate as appropriate  Outcome: Completed     Problem: RISK FOR INFECTION - ADULT  Goal: Absence of fever/infection during anticipated neutropenic period  Description: INTERVENTIONS  - Monitor WBC  - Administer growth factors as ordered  - Implement neutropenic guidelines  Outcome: Completed     Problem: SAFETY ADULT - FALL  Goal: Free from fall injury  Description: INTERVENTIONS:  - Assess pt frequently for physical needs  - Identify cognitive and physical deficits and behaviors that affect risk of falls.  - [place] fall precautions as indicated by assessment.  - Educate pt/family on patient safety including physical limitations  - Instruct pt to call for assistance with activity based on assessment  - Modify environment to reduce risk of injury  - Provide assistive devices as appropriate  - Consider OT/PT consult to assist with strengthening/mobility  - Encourage toileting schedule  Outcome: Completed     Problem: DISCHARGE PLANNING  Goal: Discharge to home or other facility with appropriate resources  Description: INTERVENTIONS:  - Identify barriers to discharge w/pt and caregiver  - Include patient/family/discharge partner in discharge planning  - Arrange for needed discharge resources and transportation as appropriate  - Identify discharge learning needs (meds, wound care, etc)  - Arrange for interpreters to assist at discharge as needed  - Consider post-discharge preferences of patient/family/discharge partner  - Complete POLST form as appropriate  - Assess patient's ability to be responsible for managing their own health  - Refer to  Case Management Department for coordinating discharge planning if the patient needs post-hospital services based on physician/LIP order or complex needs related to functional status, cognitive ability or social support system  Outcome: Completed     Problem: GASTROINTESTINAL - ADULT  Goal: Maintains or returns to baseline bowel function  Description: INTERVENTIONS:  - Assess bowel function  - Maintain adequate hydration with IV or PO as ordered and tolerated  - Evaluate effectiveness of GI medications  - Encourage mobilization and activity  - Obtain nutritional consult as needed  - Establish a toileting routine/schedule  - Consider collaborating with pharmacy to review patient's medication profile  Outcome: Completed  Goal: Minimal or absence of nausea and vomiting  Description: INTERVENTIONS:  - Maintain adequate hydration with IV or PO as ordered and tolerated  - Nasogastric tube to low intermittent suction as ordered  - Evaluate effectiveness of ordered antiemetic medications  - Provide nonpharmacologic comfort measures as appropriate  - Advance diet as tolerated, if ordered  - Obtain nutritional consult as needed  - Evaluate fluid balance  Outcome: Completed     Problem: SKIN/TISSUE INTEGRITY - ADULT  Goal: Skin integrity remains intact  Description: INTERVENTIONS  - Assess and document risk factors for pressure ulcer development  - Assess and document skin integrity  - Monitor for areas of redness and/or skin breakdown  - Initiate interventions, skin care algorithm/standards of care as needed  Outcome: Completed  Goal: Incision(s), wounds(s) or drain site(s) healing without S/S of infection  Description: INTERVENTIONS:  - Assess and document risk factors for pressure ulcer development  - Assess and document skin integrity  - Assess and document dressing/incision, wound bed, drain sites and surrounding tissue  - Implement wound care per orders  - Initiate isolation precautions as appropriate  - Initiate Pressure  Ulcer prevention bundle as indicated  Outcome: Completed     Problem: MUSCULOSKELETAL - ADULT  Goal: Return mobility to safest level of function  Description: INTERVENTIONS:  - Assess patient stability and activity tolerance for standing, transferring and ambulating w/ or w/o assistive devices  - Assist with transfers and ambulation using safe patient handling equipment as needed  - Ensure adequate protection for wounds/incisions during mobilization  - Obtain PT/OT consults as needed  - Advance activity as appropriate  - Communicate ordered activity level and limitations with patient/family  Outcome: Completed     Problem: Impaired Functional Mobility  Goal: Achieve highest/safest level of mobility/gait  Description: Interventions:  - Assess patient's functional ability and stability  - Promote increasing activity/tolerance for mobility and gait  - Educate and engage patient/family in tolerated activity level and precautions  - Recommend use of sit-stand lift for transfers  - Recommend use of total lift for transfers  - Recommend use of  RW for transfers and ambulation  - Recommend patient transfer to bedside chair toward strongest side  - When transferring patient, block weaker knee for safety  - Recommend use of chair position in bed 3 times per day  Outcome: Completed     Problem: Impaired Activities of Daily Living  Goal: Achieve highest/safest level of independence in self care  Description: Interventions:  - Assess ability and encourage patient to participate in ADLs to maximize function  - Promote sitting position while performing ADLs such as feeding, grooming, and bathing  - Educate and encourage patient/family in tolerated functional activity level and precautions during self-care  - Encourage patient to incorporate impaired side during daily activities to promote function  Outcome: Completed

## 2025-07-22 NOTE — CM/SW NOTE
07/22/25 1107   Discharge disposition   Expected discharge disposition subacute   Post Acute Care Provider   ([facility])   Discharge transportation [facility]     Patient discussed in RN DC rounds. Patient will be returning to [facility]. Pt requires an ambulance according to the RN due to being a max assist, iso for CDIFF. SW called and ordered an Ambulance from [facility] to transport pt to [facility]  at on will call  PCS flow sheet completed. RN to attached to AVS and print out.       HAYDE/THEO to remain available for support and/or discharge planning.     Maira YOUNG, MSW, LSW   x [phone]

## 2025-07-22 NOTE — PLAN OF CARE
Problem: Patient Centered Care  Goal: Patient preferences are identified and integrated in the patient's plan of care  Description: Interventions:  - What would you like us to know as we care for you? From [facility]  - Provide timely, complete, and accurate information to patient/family  - Incorporate patient and family knowledge, values, beliefs, and cultural backgrounds into the planning and delivery of care  - Encourage patient/family to participate in care and decision-making at the level they choose  - Honor patient and family perspectives and choices  Outcome: Progressing     Problem: PAIN - ADULT  Goal: Verbalizes/displays adequate comfort level or patient's stated pain goal  Description: INTERVENTIONS:  - Encourage pt to monitor pain and request assistance  - Assess pain using appropriate pain scale  - Administer analgesics based on type and severity of pain and evaluate response  - Implement non-pharmacological measures as appropriate and evaluate response  - Consider cultural and social influences on pain and pain management  - Manage/alleviate anxiety  - Utilize distraction and/or relaxation techniques  - Monitor for opioid side effects  - Notify MD/LIP if interventions unsuccessful or patient reports new pain  - Anticipate increased pain with activity and pre-medicate as appropriate  Outcome: Progressing     Problem: RISK FOR INFECTION - ADULT  Goal: Absence of fever/infection during anticipated neutropenic period  Description: INTERVENTIONS  - Monitor WBC  - Administer growth factors as ordered  - Implement neutropenic guidelines  Outcome: Progressing     Problem: SAFETY ADULT - FALL  Goal: Free from fall injury  Description: INTERVENTIONS:  - Assess pt frequently for physical needs  - Identify cognitive and physical deficits and behaviors that affect risk of falls.  - [place] fall precautions as indicated by assessment.  - Educate pt/family on patient safety including physical limitations  -  Instruct pt to call for assistance with activity based on assessment  - Modify environment to reduce risk of injury  - Provide assistive devices as appropriate  - Consider OT/PT consult to assist with strengthening/mobility  - Encourage toileting schedule  Outcome: Progressing     Problem: GASTROINTESTINAL - ADULT  Goal: Maintains or returns to baseline bowel function  Description: INTERVENTIONS:  - Assess bowel function  - Maintain adequate hydration with IV or PO as ordered and tolerated  - Evaluate effectiveness of GI medications  - Encourage mobilization and activity  - Obtain nutritional consult as needed  - Establish a toileting routine/schedule  - Consider collaborating with pharmacy to review patient's medication profile  Outcome: Progressing  Goal: Minimal or absence of nausea and vomiting  Description: INTERVENTIONS:  - Maintain adequate hydration with IV or PO as ordered and tolerated  - Nasogastric tube to low intermittent suction as ordered  - Evaluate effectiveness of ordered antiemetic medications  - Provide nonpharmacologic comfort measures as appropriate  - Advance diet as tolerated, if ordered  - Obtain nutritional consult as needed  - Evaluate fluid balance  Outcome: Progressing     Problem: SKIN/TISSUE INTEGRITY - ADULT  Goal: Skin integrity remains intact  Description: INTERVENTIONS  - Assess and document risk factors for pressure ulcer development  - Assess and document skin integrity  - Monitor for areas of redness and/or skin breakdown  - Initiate interventions, skin care algorithm/standards of care as needed  Outcome: Progressing     Problem: DISCHARGE PLANNING  Goal: Discharge to home or other facility with appropriate resources  Description: INTERVENTIONS:  - Identify barriers to discharge w/pt and caregiver  - Include patient/family/discharge partner in discharge planning  - Arrange for needed discharge resources and transportation as appropriate  - Identify discharge learning needs  (meds, wound care, etc)  - Arrange for interpreters to assist at discharge as needed  - Consider post-discharge preferences of patient/family/discharge partner  - Complete POLST form as appropriate  - Assess patient's ability to be responsible for managing their own health  - Refer to Case Management Department for coordinating discharge planning if the patient needs post-hospital services based on physician/LIP order or complex needs related to functional status, cognitive ability or social support system  Outcome: Progressing     Problem: MUSCULOSKELETAL - ADULT  Goal: Return mobility to safest level of function  Description: INTERVENTIONS:  - Assess patient stability and activity tolerance for standing, transferring and ambulating w/ or w/o assistive devices  - Assist with transfers and ambulation using safe patient handling equipment as needed  - Ensure adequate protection for wounds/incisions during mobilization  - Obtain PT/OT consults as needed  - Advance activity as appropriate  - Communicate ordered activity level and limitations with patient/family  Outcome: Progressing
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
## 2025-07-22 NOTE — CM/SW NOTE
07/22/25 1107   Discharge disposition   Expected discharge disposition subacute   Post Acute Care Provider   ([facility])   Discharge transportation [facility]     Patient discussed in RN DC rounds. Patient will be returning to [facility]. Pt requires an ambulance according to the RN due to being a max assist, iso for CDIFF. SW called and ordered an Ambulance from [facility] to transport pt to [facility]  at on will call  PCS flow sheet completed. RN to attached to AVS and print out.     248pm- SW was informed that patient wishes to go elsewhere rather than [facility]. SW informed patient that she received MDO for discharge. SW informed patient and spouse at bedside that patient will be going back to [facility] today, and SW can send a referral via aidin to [facility]. SW informed patient and spouse that just because HAYDE sends a referral to [facility] does not mean that patient is accept there. HAYDE informed that they and [facility] will have to follow up with       HAYDE/THEO to remain available for support and/or discharge planning.     Maira YOUNG, MSW, LSW   x [phone]

## 2025-07-22 NOTE — DISCHARGE INSTRUCTIONS
(1) Follow up with primary care physician for appointment in 1 week. Please make an appointment.  (2) Follow up with Dr. Tian, Infection & Disease doctor in 2 weeks for appointment.  (3) Please take all medications as directed by the doctor.   (4) Please monitor culver catheter for urine output.  (5) Patient should be turn and repositioned every 2 hours. Prompt incontinence care should be given.  (6) Notify doctor immediately for fever, chills, vomiting, inability to tolerate meals or shortness of breath.

## 2025-07-25 ENCOUNTER — SNF DISCHARGE (OUTPATIENT)
Dept: INTERNAL MEDICINE CLINIC | Facility: SKILLED NURSING FACILITY | Age: 70
End: 2025-07-25

## 2025-07-25 DIAGNOSIS — A04.72 CLOSTRIDIUM DIFFICILE COLITIS: Primary | ICD-10-CM

## 2025-07-25 DIAGNOSIS — S82.202G TIBIA/FIBULA FRACTURE, LEFT, CLOSED, WITH DELAYED HEALING, SUBSEQUENT ENCOUNTER: ICD-10-CM

## 2025-07-25 DIAGNOSIS — I48.19 PERSISTENT ATRIAL FIBRILLATION (HCC): ICD-10-CM

## 2025-07-25 DIAGNOSIS — S82.402G TIBIA/FIBULA FRACTURE, LEFT, CLOSED, WITH DELAYED HEALING, SUBSEQUENT ENCOUNTER: ICD-10-CM

## 2025-07-25 NOTE — ASSESSMENT & PLAN NOTE
- improved with IV volume support.  Continue to monitor on telemetry  A/c w/ Eliquis  Holding long-acting antiarrhythmics and spironolactone at this time due to initial presentation with hypotension and need for pressors.    7/18: holding off on resuming long acting dilt due to soft blood pressures and brief need for pressors on admission- pt refusing short acting due to concerns with SE; stool output reducing slowly
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
## 2025-10-01 ENCOUNTER — APPOINTMENT (OUTPATIENT)
Age: 70
End: 2025-10-01

## (undated) DEVICE — SUTURE VICRYL 0 CT-2

## (undated) DEVICE — SOL  .9 1000ML BTL

## (undated) DEVICE — 3M(TM) TEGADERM(TM) TRANSPARENT FILM DRESSING FRAME STYLE 1628: Brand: 3M™ TEGADERM™

## (undated) DEVICE — SUTURE PROLENE 1 CTX

## (undated) DEVICE — GOWN SURG AERO BLUE PERF XLG

## (undated) DEVICE — STERILE LATEX POWDER-FREE SURGICAL GLOVESWITH NITRILE COATING: Brand: PROTEXIS

## (undated) DEVICE — OCCLUSIVE GAUZE STRIP,3% BISMUTH TRIBROMOPHENATE IN PETROLATUM BLEND: Brand: XEROFORM

## (undated) DEVICE — 20 ML SYRINGE LUER-LOCK TIP: Brand: MONOJECT

## (undated) DEVICE — SPONGE LAP 18X18 XRAY STRL

## (undated) DEVICE — SUTURE VICRYL 2-0 CT-1

## (undated) DEVICE — Device: Brand: DEFENDO AIR/WATER/SUCTION AND BIOPSY VALVE

## (undated) DEVICE — STERILE POLYISOPRENE POWDER-FREE SURGICAL GLOVES: Brand: PROTEXIS

## (undated) DEVICE — Device: Brand: JELCO

## (undated) DEVICE — SUTURE VICRYL 3-0 SH

## (undated) DEVICE — 3M™ IOBAN™ 2 ANTIMICROBIAL INCISE DRAPE 6650EZ: Brand: IOBAN™ 2

## (undated) DEVICE — DRAPE SHEET LAPAROTOMY

## (undated) DEVICE — SPONGE: SPECIALTY PEANUT XR 100/CS: Brand: MEDICAL ACTION INDUSTRIES

## (undated) DEVICE — MINOR GENERAL: Brand: MEDLINE INDUSTRIES, INC.

## (undated) DEVICE — UNDYED MONOFILAMENT (POLYDIOXANONE), ABSORBABLE SURGICAL SUTURE: Brand: PDS

## (undated) DEVICE — ENDOSCOPY PACK - LOWER: Brand: MEDLINE INDUSTRIES, INC.

## (undated) DEVICE — PROXIMATE SKIN STAPLERS (35 WIDE) CONTAINS 35 STAINLESS STEEL STAPLES (FIXED HEAD): Brand: PROXIMATE
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
## (undated) NOTE — LETTER
[facility], [place]  Authorization for Invasive Procedures  1.  I hereby authorize  Dr. Chery Salazar / Wellington Rogers  , my physician and whomever may be designated as the doctor's assistant, to perform the following operation and/or procedure a directed donor transfusion, I will discuss this with my physician.      5. I consent to the photographing of the operations or procedures to be performed for the purposes of advancing medicine, science, and/or education, provided my identity is not reveal Witness Signature: ____________________________________________ Date: __________ Time: ___________    Statement of Physician  My signature below affirms that prior to the time of the procedure, I have explained to the patient and/or her legal representativ

## (undated) NOTE — LETTER
[facility]  Administration of Anesthesia  1. I, Livia Hill, or _________________________________ acting on her behalf, (Patient) (Dependent/Representative) request to receive anesthesia for my pending procedure/operation/treatment. infections, high spinal block, spinal bleeding, seizure, cardiac arrest and death. 7. AWARENESS: I understand that it is possible (but unlikely) to have explicit memory of events from the operating room while under general anesthesia.   8. ELECTROCONVULSIV (Date) (Time)                                                                                               (Responsible person in case of minor/ unconscious pt) /Relationship    My signature below affirms that prior to the time of the procedure, I have ex

## (undated) NOTE — LETTER
September 14, 2021         Erendiradavidmarilyn Gavinpamela [facility] [phone]      Patient: Ekaterina Born   YOB: 1955   Date of Visit: 9/14/2021       Dear Dr. Ramez Arana MD,    I saw your patient, Ekaterina Born, on 9/14/

## (undated) NOTE — LETTER
[address]  [facility], [place], [place]  [address]  INFORMED CONSENT FOR TRANSFUSION OF BLOOD OR BLOOD PRODUCTS   My physician has informed me of the nature, purpose, benefits and risks of transfusion for blood and blood components nelson (Signature of Patient)                                                           (Responsible party in case of Minor,                                                                                                Incompetent, or unconscious Patient)  _____

## (undated) NOTE — LETTER
[address] [place] [address]  Authorization for Invasive Procedures  1.  I hereby authorize Dr. Lilly Low, my physician and whomever may be designated as the doctor's assistant, to perform the following operation and/or procedure:  Right G 5. I consent to the photographing of the operations or procedures to be performed for the purposes of advancing medicine, science, and/or education, provided my identity is not revealed.  If the procedure has been videotaped, the physician/surgeon will obta __________ Time: ___________    Statement of Physician  My signature below affirms that prior to the time of the procedure, I have explained to the patient and/or her legal representative, the risks and benefits involved in the proposed treatment and any r

## (undated) NOTE — LETTER
[facility]  Authorization for Invasive Procedures  1.  I hereby authorize  Dr. Alix Perez / Christiano Pradhan  , my physician and whomever may be designated as the doctor's assistant, to perform the following operation and/or procedure 5. I consent to the photographing of the operations or procedures to be performed for the purposes of advancing medicine, science, and/or education, provided my identity is not revealed.  If the procedure has been videotaped, the physician/surgeon will obta __________ Time: ___________    Statement of Physician  My signature below affirms that prior to the time of the procedure, I have explained to the patient and/or her legal representative, the risks and benefits involved in the proposed treatment and any r

## (undated) NOTE — LETTER
Hospital Discharge Documentation  Pt d/c from [address] [place] [address] post surgical procedure. Pt instructed to follow up with you and surgical team as outpatient as well as instructed to follow a low residue diet. Please phone to schedule a hospital follow up appointment. Abnormal electrocardiogram     Rosacea     Vitamin D deficiency     Venous (peripheral) insufficiency     Elevated blood pressure     Venous insufficiency     Varicose veins     Atrial fibrillation (HCC)     Small bowel obstruction (HCC)     Ventral her loop obstruction. There was scattered colonic diverticula without evidence of diverticulitis. There was a posterior right eleventh rib fracture, question chronicity.   The patient's white count was 13.4 with a hemoglobin of 16.3 and a platelet count of 26 Take 1 capsule (120 mg total) by mouth daily.    Quantity:  30 capsule  Refills:  2        CONTINUE taking these medications      Instructions Prescription details   ADVIL 200 MG Tabs  Generic drug:  ibuprofen      Take 200 mg by mouth every 6 (six) hours Bring a paper prescription for each of these medications  · DilTIAZem HCl ER Coated Beads 120 MG Cp24  · hydrocodone-acetaminophen 7.5-325 MG Tabs[FG.2]         Follow up Visits:  Follow-up with PCP in 1 week    Follow up Labs: n/a     Other Discharge Instr

## (undated) NOTE — LETTER
8/13/2020              [address]        [facility] [id number]         Dear Kirsten Wilson,    I had neglected to give you contact information to Dr. Rico Wing as it relates to her elevated immunoglobin a level.     I think it